# Patient Record
Sex: MALE | Race: WHITE | NOT HISPANIC OR LATINO | Employment: UNEMPLOYED | URBAN - METROPOLITAN AREA
[De-identification: names, ages, dates, MRNs, and addresses within clinical notes are randomized per-mention and may not be internally consistent; named-entity substitution may affect disease eponyms.]

---

## 2017-01-03 ENCOUNTER — HOSPITAL ENCOUNTER (OUTPATIENT)
Dept: CT IMAGING | Facility: HOSPITAL | Age: 47
Discharge: HOME | End: 2017-01-03
Attending: INTERNAL MEDICINE | Admitting: INTERNAL MEDICINE

## 2017-01-03 PROCEDURE — 85027 COMPLETE CBC AUTOMATED: CPT | Performed by: NEUROLOGICAL SURGERY

## 2017-01-03 PROCEDURE — 71250 CT THORAX DX C-: CPT

## 2017-01-09 ENCOUNTER — OFFICE VISIT (OUTPATIENT)
Dept: INTERNAL MEDICINE | Facility: CLINIC | Age: 47
End: 2017-01-09

## 2017-01-09 VITALS
WEIGHT: 201 LBS | HEIGHT: 74 IN | BODY MASS INDEX: 25.8 KG/M2 | SYSTOLIC BLOOD PRESSURE: 120 MMHG | DIASTOLIC BLOOD PRESSURE: 90 MMHG | HEART RATE: 72 BPM

## 2017-01-09 DIAGNOSIS — E78.5 DYSLIPIDEMIA: ICD-10-CM

## 2017-01-09 DIAGNOSIS — K21.9 GASTROESOPHAGEAL REFLUX DISEASE WITHOUT ESOPHAGITIS: ICD-10-CM

## 2017-01-09 DIAGNOSIS — G62.9 NEUROPATHY: ICD-10-CM

## 2017-01-09 DIAGNOSIS — G63 NEUROPATHY WITH IGA MONOCLONAL GAMMOPATHY (HCC): ICD-10-CM

## 2017-01-09 DIAGNOSIS — E29.1 HYPOGONADISM IN MALE: ICD-10-CM

## 2017-01-09 DIAGNOSIS — G43.009 MIGRAINE WITHOUT AURA AND WITHOUT STATUS MIGRAINOSUS, NOT INTRACTABLE: ICD-10-CM

## 2017-01-09 DIAGNOSIS — I10 ESSENTIAL HYPERTENSION: Primary | ICD-10-CM

## 2017-01-09 DIAGNOSIS — K22.70 BARRETT'S ESOPHAGUS WITHOUT DYSPLASIA: ICD-10-CM

## 2017-01-09 DIAGNOSIS — J30.1 SEASONAL ALLERGIC RHINITIS DUE TO POLLEN: ICD-10-CM

## 2017-01-09 DIAGNOSIS — N41.1 CHRONIC PROSTATITIS: ICD-10-CM

## 2017-01-09 DIAGNOSIS — D47.2 NEUROPATHY WITH IGA MONOCLONAL GAMMOPATHY (HCC): ICD-10-CM

## 2017-01-09 DIAGNOSIS — G89.29 OTHER CHRONIC PAIN: ICD-10-CM

## 2017-01-09 DIAGNOSIS — F32.89 OTHER DEPRESSION: ICD-10-CM

## 2017-01-09 LAB
ANION GAP SERPL CALCULATED.3IONS-SCNC: 10 MMOL/L (ref 3–11)
BASOPHILS # BLD AUTO: 0.08 10*3/MM3 (ref 0–0.2)
BASOPHILS NFR BLD AUTO: 0.8 % (ref 0–1)
BILIRUB BLD-MCNC: NEGATIVE MG/DL
BUN BLD-MCNC: 14 MG/DL (ref 9–23)
BUN/CREAT SERPL: 9.3 (ref 7–25)
CALCIUM SPEC-SCNC: 9.5 MG/DL (ref 8.7–10.4)
CHLORIDE SERPL-SCNC: 106 MMOL/L (ref 99–109)
CLARITY, POC: CLEAR
CO2 SERPL-SCNC: 28 MMOL/L (ref 20–31)
COLOR UR: YELLOW
CREAT BLD-MCNC: 1.5 MG/DL (ref 0.6–1.3)
DEPRECATED RDW RBC AUTO: 41.6 FL (ref 37–54)
EOSINOPHIL # BLD AUTO: 0.48 10*3/MM3 (ref 0.1–0.3)
EOSINOPHIL NFR BLD AUTO: 5 % (ref 0–3)
ERYTHROCYTE [DISTWIDTH] IN BLOOD BY AUTOMATED COUNT: 13.1 % (ref 11.3–14.5)
GFR SERPL CREATININE-BSD FRML MDRD: 51 ML/MIN/1.73
GLUCOSE BLD-MCNC: 77 MG/DL (ref 70–100)
GLUCOSE UR STRIP-MCNC: NEGATIVE MG/DL
HCT VFR BLD AUTO: 47.6 % (ref 38.9–50.9)
HGB BLD-MCNC: 17 G/DL (ref 13.1–17.5)
IMM GRANULOCYTES # BLD: 0.02 10*3/MM3 (ref 0–0.03)
IMM GRANULOCYTES NFR BLD: 0.2 % (ref 0–0.6)
KETONES UR QL: NEGATIVE
LEUKOCYTE EST, POC: NEGATIVE
LYMPHOCYTES # BLD AUTO: 2.52 10*3/MM3 (ref 0.6–4.8)
LYMPHOCYTES NFR BLD AUTO: 26.1 % (ref 24–44)
MCH RBC QN AUTO: 31.1 PG (ref 27–31)
MCHC RBC AUTO-ENTMCNC: 35.7 G/DL (ref 32–36)
MCV RBC AUTO: 87 FL (ref 80–99)
MONOCYTES # BLD AUTO: 0.95 10*3/MM3 (ref 0–1)
MONOCYTES NFR BLD AUTO: 9.8 % (ref 0–12)
NEUTROPHILS # BLD AUTO: 5.61 10*3/MM3 (ref 1.5–8.3)
NEUTROPHILS NFR BLD AUTO: 58.1 % (ref 41–71)
NITRITE UR-MCNC: NEGATIVE MG/ML
PH UR: 5 [PH] (ref 5–8)
PLATELET # BLD AUTO: 255 10*3/MM3 (ref 150–450)
PMV BLD AUTO: 11.3 FL (ref 6–12)
POTASSIUM BLD-SCNC: 4.5 MMOL/L (ref 3.5–5.5)
PROT UR STRIP-MCNC: NEGATIVE MG/DL
RBC # BLD AUTO: 5.47 10*6/MM3 (ref 4.2–5.76)
RBC # UR STRIP: NEGATIVE /UL
SODIUM BLD-SCNC: 144 MMOL/L (ref 132–146)
SP GR UR: 1.02 (ref 1–1.03)
UROBILINOGEN UR QL: NORMAL
WBC NRBC COR # BLD: 9.66 10*3/MM3 (ref 3.5–10.8)

## 2017-01-09 PROCEDURE — 81003 URINALYSIS AUTO W/O SCOPE: CPT | Performed by: INTERNAL MEDICINE

## 2017-01-09 PROCEDURE — 85025 COMPLETE CBC W/AUTO DIFF WBC: CPT | Performed by: INTERNAL MEDICINE

## 2017-01-09 PROCEDURE — 80048 BASIC METABOLIC PNL TOTAL CA: CPT | Performed by: INTERNAL MEDICINE

## 2017-01-09 PROCEDURE — 99214 OFFICE O/P EST MOD 30 MIN: CPT | Performed by: INTERNAL MEDICINE

## 2017-01-09 PROCEDURE — 36415 COLL VENOUS BLD VENIPUNCTURE: CPT | Performed by: INTERNAL MEDICINE

## 2017-01-09 NOTE — PROGRESS NOTES
"Patient is a 46 y.o. male who is here for abnormal lab results.  Chief Complaint   Patient presents with   • Abnormal Lab         HPI:  Here for f/u.  Has recent labs and noted to have mildly elevated WBC.  No fever.  Occasional night sweats.  Some dysuria.  No cough.  Occasional nausea.  No diarrhea.  No skin rash.     History:    Patient Active Problem List   Diagnosis   • Allergic rhinitis   • Salvador esophagus   • Chronic pain   • Chronic prostatitis   • Depression   • Dyslipidemia   • GERD (gastroesophageal reflux disease)   • Hypertension   • Hypogonadism in male   • Neuropathy with IgA monoclonal gammopathy   • Migraine   • Neuropathy   • Pulmonary nodule   • Vertigo   • Tinea versicolor   • Acute frontal sinusitis       Past Medical History   Diagnosis Date   • Dupuytren contracture      s/p bilateral hand surgery   • Dyspnea    • Kidney infection    • Pancreatitis        Past Surgical History   Procedure Laterality Date   • Hand surgery     • Umbilical hernia repair       Dr. Diego Vasquez   • Lumbar discectomy       \"Spinal\" times 2 in 2003 and 2005       Current Outpatient Prescriptions on File Prior to Visit   Medication Sig   • azelastine (ASTEPRO) 0.15 % solution nasal spray 1 Squirt into each nostril 2 (two) times a day.   • cetirizine (ZyrTEC) 10 MG tablet Take 1 tablet by mouth Daily As Needed for allergies.   • doxazosin (CARDURA) 2 MG tablet Take 1 tablet by mouth Every Night.   • DULoxetine (CYMBALTA) 60 MG capsule Take 1 capsule by mouth daily.   • famotidine (PEPCID) 40 MG tablet every night.   • fluticasone (FLONASE) 50 MCG/ACT nasal spray 2 (two) times a day.   • gabapentin (NEURONTIN) 600 MG tablet TAKE 1 TABLET BY MOUTH TWICE DAILY.   • lisinopril (PRINIVIL,ZESTRIL) 2.5 MG tablet TAKE 1 TABLET BY MOUTH ONCE DAILY.   • meclizine (ANTIVERT) 25 MG tablet Take  by mouth 4 (four) times a day.   • methocarbamol (ROBAXIN) 750 MG tablet 3 (Three) Times a Day As Needed.   • omeprazole (PriLOSEC) 40 " MG capsule 2 (two) times a day.   • ondansetron (ZOFRAN) 4 MG tablet Take  by mouth.   • oxyCODONE-acetaminophen (PERCOCET) 5-325 MG per tablet 3 (three) times a day as needed.   • SUMAtriptan (IMITREX) 50 MG tablet Take one tablet at onset of headache. May repeat dose one time in 2 hours if headache not relieved.     No current facility-administered medications on file prior to visit.        Family History   Problem Relation Age of Onset   • Hypertension Other    • Other Other        Social History     Social History   • Marital status:      Spouse name: N/A   • Number of children: N/A   • Years of education: N/A     Occupational History   • Not on file.     Social History Main Topics   • Smoking status: Current Every Day Smoker     Packs/day: 0.50   • Smokeless tobacco: Not on file   • Alcohol use Not on file   • Drug use: Not on file   • Sexual activity: Not on file     Other Topics Concern   • Not on file     Social History Narrative         ROS:    Review of Systems   Constitutional: Negative for chills, diaphoresis, fatigue, fever and unexpected weight change.   HENT: Negative for congestion, ear pain, hearing loss, nosebleeds, postnasal drip, sinus pressure and sore throat.    Eyes: Negative for pain, discharge and itching.   Respiratory: Negative for cough, chest tightness, shortness of breath and wheezing.    Cardiovascular: Negative for chest pain, palpitations and leg swelling.   Gastrointestinal: Positive for abdominal pain. Negative for abdominal distention, blood in stool, constipation, diarrhea, nausea and vomiting.   Endocrine: Negative for polydipsia and polyuria.   Genitourinary: Positive for dysuria, frequency and urgency. Negative for difficulty urinating and hematuria.   Musculoskeletal: Negative for arthralgias, back pain, gait problem, joint swelling and myalgias.   Skin: Negative for rash and wound.   Neurological: Positive for light-headedness and headaches. Negative for dizziness,  "syncope and weakness.   Psychiatric/Behavioral: Positive for behavioral problems and sleep disturbance. Negative for dysphoric mood. The patient is not nervous/anxious.        Visit Vitals   • /90   • Pulse 72   • Ht 74\" (188 cm)   • Wt 201 lb (91.2 kg)   • BMI 25.81 kg/m2       Physical Exam:    Physical Exam   Constitutional: He is oriented to person, place, and time. He appears well-developed and well-nourished.   HENT:   Head: Normocephalic and atraumatic.   Right Ear: External ear normal.   Left Ear: External ear normal.   Mouth/Throat: Oropharynx is clear and moist.   Eyes: Conjunctivae and EOM are normal.   Neck: Normal range of motion. Neck supple.   Cardiovascular: Normal rate, regular rhythm and normal heart sounds.    Pulmonary/Chest: Effort normal and breath sounds normal.   Abdominal: Soft. Bowel sounds are normal.   Musculoskeletal: Normal range of motion.   Lymphadenopathy:     He has no cervical adenopathy.   Neurological: He is alert and oriented to person, place, and time.   Skin: Skin is warm and dry.   Psychiatric: He has a normal mood and affect. His behavior is normal. Thought content normal.       Procedure:      Discussion/Summary:  hypogonadism-no planned tx  gerd-cont ppi  htn-advised to monitor with goal <130/80, advised to limit NA  hyperlipidemia-labs on rtc, counseled on diet  igA nephropathy-f/u nephrology  migraines-change to imitrex  chronic pain-per pain mgmt, cont gabapentin and cont cymbalta at 60 mg  chronic prostatitis-see above  allergic rhinitis-switch to zyrtec  abdominal pain- cont pepcid  tinea versicolor-ketoconzole cream prn  pulmonary nodule-rescan soon   Elevated WBC-recheck      labs noted and dw patient      Current Outpatient Prescriptions:   •  azelastine (ASTEPRO) 0.15 % solution nasal spray, 1 Squirt into each nostril 2 (two) times a day., Disp: , Rfl:   •  cetirizine (ZyrTEC) 10 MG tablet, Take 1 tablet by mouth Daily As Needed for allergies., Disp: 30 " tablet, Rfl: 2  •  doxazosin (CARDURA) 2 MG tablet, Take 1 tablet by mouth Every Night., Disp: 30 tablet, Rfl: 5  •  DULoxetine (CYMBALTA) 60 MG capsule, Take 1 capsule by mouth daily., Disp: , Rfl:   •  famotidine (PEPCID) 40 MG tablet, every night., Disp: , Rfl: 0  •  fluticasone (FLONASE) 50 MCG/ACT nasal spray, 2 (two) times a day., Disp: , Rfl: 9  •  gabapentin (NEURONTIN) 600 MG tablet, TAKE 1 TABLET BY MOUTH TWICE DAILY., Disp: 60 tablet, Rfl: 1  •  lisinopril (PRINIVIL,ZESTRIL) 2.5 MG tablet, TAKE 1 TABLET BY MOUTH ONCE DAILY., Disp: 90 tablet, Rfl: 1  •  meclizine (ANTIVERT) 25 MG tablet, Take  by mouth 4 (four) times a day., Disp: , Rfl:   •  methocarbamol (ROBAXIN) 750 MG tablet, 3 (Three) Times a Day As Needed., Disp: , Rfl: 5  •  omeprazole (PriLOSEC) 40 MG capsule, 2 (two) times a day., Disp: , Rfl: 11  •  ondansetron (ZOFRAN) 4 MG tablet, Take  by mouth., Disp: , Rfl:   •  oxyCODONE-acetaminophen (PERCOCET) 5-325 MG per tablet, 3 (three) times a day as needed., Disp: , Rfl: 0  •  SUMAtriptan (IMITREX) 50 MG tablet, Take one tablet at onset of headache. May repeat dose one time in 2 hours if headache not relieved., Disp: 9 tablet, Rfl: 2        Lux was seen today for abnormal lab.    Diagnoses and all orders for this visit:    Essential hypertension  -     CBC & Differential  -     Basic Metabolic Panel    Migraine without aura and without status migrainosus, not intractable    Seasonal allergic rhinitis due to pollen    Salvador's esophagus without dysplasia    Gastroesophageal reflux disease without esophagitis    Hypogonadism in male    Neuropathy with IgA monoclonal gammopathy    Neuropathy    Chronic prostatitis  -     POC Urinalysis Dipstick, Automated    Other chronic pain    Other depression    Dyslipidemia

## 2017-03-07 ENCOUNTER — OFFICE VISIT (OUTPATIENT)
Dept: INTERNAL MEDICINE | Facility: CLINIC | Age: 47
End: 2017-03-07

## 2017-03-07 VITALS
DIASTOLIC BLOOD PRESSURE: 92 MMHG | HEART RATE: 72 BPM | BODY MASS INDEX: 25.87 KG/M2 | SYSTOLIC BLOOD PRESSURE: 110 MMHG | WEIGHT: 201.6 LBS | HEIGHT: 74 IN

## 2017-03-07 DIAGNOSIS — G43.009 MIGRAINE WITHOUT AURA AND WITHOUT STATUS MIGRAINOSUS, NOT INTRACTABLE: ICD-10-CM

## 2017-03-07 DIAGNOSIS — D47.2 NEUROPATHY WITH IGA MONOCLONAL GAMMOPATHY (HCC): ICD-10-CM

## 2017-03-07 DIAGNOSIS — G63 NEUROPATHY WITH IGA MONOCLONAL GAMMOPATHY (HCC): ICD-10-CM

## 2017-03-07 DIAGNOSIS — E78.5 DYSLIPIDEMIA: ICD-10-CM

## 2017-03-07 DIAGNOSIS — G89.29 OTHER CHRONIC PAIN: ICD-10-CM

## 2017-03-07 DIAGNOSIS — N41.1 CHRONIC PROSTATITIS: ICD-10-CM

## 2017-03-07 DIAGNOSIS — G62.9 NEUROPATHY: ICD-10-CM

## 2017-03-07 DIAGNOSIS — E29.1 HYPOGONADISM IN MALE: ICD-10-CM

## 2017-03-07 DIAGNOSIS — K22.70 BARRETT'S ESOPHAGUS WITHOUT DYSPLASIA: ICD-10-CM

## 2017-03-07 DIAGNOSIS — J30.1 SEASONAL ALLERGIC RHINITIS DUE TO POLLEN: ICD-10-CM

## 2017-03-07 DIAGNOSIS — I10 ESSENTIAL HYPERTENSION: Primary | ICD-10-CM

## 2017-03-07 DIAGNOSIS — J01.10 ACUTE FRONTAL SINUSITIS, RECURRENCE NOT SPECIFIED: ICD-10-CM

## 2017-03-07 DIAGNOSIS — F32.89 OTHER DEPRESSION: ICD-10-CM

## 2017-03-07 DIAGNOSIS — K21.9 GASTROESOPHAGEAL REFLUX DISEASE WITHOUT ESOPHAGITIS: ICD-10-CM

## 2017-03-07 PROCEDURE — 99214 OFFICE O/P EST MOD 30 MIN: CPT | Performed by: INTERNAL MEDICINE

## 2017-03-07 RX ORDER — DOXYCYCLINE HYCLATE 100 MG/1
100 CAPSULE ORAL 2 TIMES DAILY
Qty: 20 CAPSULE | Refills: 0 | Status: ON HOLD | OUTPATIENT
Start: 2017-03-07 | End: 2020-03-22

## 2017-03-07 RX ORDER — OXYCODONE AND ACETAMINOPHEN 7.5; 325 MG/1; MG/1
TABLET ORAL EVERY 8 HOURS PRN
Refills: 0 | COMMUNITY
Start: 2017-02-07 | End: 2020-03-25 | Stop reason: HOSPADM

## 2017-03-07 NOTE — PROGRESS NOTES
"Patient is a 46 y.o. male who is here for a follow up of chronic conditions and possible sinus infection.  Chief Complaint   Patient presents with   • Hypertension   • Hyperlipidemia   • Pain         HPI:  Here for f/u.  Today c/o head congestion and throat irritation.  Some cough.  Had some facial pressure.  Continues to smoke about 1/2 ppd.  No hemoptysis or epistaxis.  No recent antibiotics.  Has some right ear pressure.     History:    Patient Active Problem List   Diagnosis   • Allergic rhinitis   • Salvador esophagus   • Chronic pain   • Chronic prostatitis   • Depression   • Dyslipidemia   • GERD (gastroesophageal reflux disease)   • Hypertension   • Hypogonadism in male   • Neuropathy with IgA monoclonal gammopathy   • Migraine   • Neuropathy   • Pulmonary nodule   • Vertigo   • Tinea versicolor   • Acute frontal sinusitis       Past Medical History   Diagnosis Date   • Dupuytren contracture      s/p bilateral hand surgery   • Dyspnea    • Kidney infection    • Pancreatitis        Past Surgical History   Procedure Laterality Date   • Hand surgery     • Umbilical hernia repair       Dr. Diego Vasquez   • Lumbar discectomy       \"Spinal\" times 2 in 2003 and 2005   • Cervical discectomy  02/2017     C6-[facility] at [facility] in [place]       Current Outpatient Prescriptions on File Prior to Visit   Medication Sig   • azelastine (ASTEPRO) 0.15 % solution nasal spray 1 Squirt into each nostril 2 (two) times a day.   • cetirizine (ZyrTEC) 10 MG tablet Take 1 tablet by mouth Daily As Needed for allergies.   • doxazosin (CARDURA) 2 MG tablet Take 1 tablet by mouth Every Night.   • DULoxetine (CYMBALTA) 60 MG capsule Take 1 capsule by mouth daily.   • famotidine (PEPCID) 40 MG tablet every night.   • fluticasone (FLONASE) 50 MCG/ACT nasal spray 2 (two) times a day.   • gabapentin (NEURONTIN) 600 MG tablet TAKE 1 TABLET BY MOUTH TWICE DAILY.   • lisinopril (PRINIVIL,ZESTRIL) 2.5 MG tablet TAKE 1 TABLET BY MOUTH ONCE DAILY.   • " meclizine (ANTIVERT) 25 MG tablet Take  by mouth 4 (four) times a day.   • methocarbamol (ROBAXIN) 750 MG tablet 3 (Three) Times a Day As Needed.   • omeprazole (PriLOSEC) 40 MG capsule 2 (two) times a day.   • ondansetron (ZOFRAN) 4 MG tablet Take  by mouth.   • SUMAtriptan (IMITREX) 50 MG tablet Take one tablet at onset of headache. May repeat dose one time in 2 hours if headache not relieved.   • [DISCONTINUED] oxyCODONE-acetaminophen (PERCOCET) 5-325 MG per tablet 3 (three) times a day as needed.     No current facility-administered medications on file prior to visit.        Family History   Problem Relation Age of Onset   • Hypertension Other    • Other Other        Social History     Social History   • Marital status:      Spouse name: N/A   • Number of children: N/A   • Years of education: N/A     Occupational History   • Not on file.     Social History Main Topics   • Smoking status: Current Every Day Smoker     Packs/day: 0.50   • Smokeless tobacco: Not on file   • Alcohol use Not on file   • Drug use: Not on file   • Sexual activity: Not on file     Other Topics Concern   • Not on file     Social History Narrative         ROS:    Review of Systems   Constitutional: Negative for chills, diaphoresis, fatigue, fever and unexpected weight change.   HENT: Positive for rhinorrhea and sinus pressure. Negative for congestion, ear pain, hearing loss, nosebleeds, postnasal drip and sore throat.    Eyes: Negative for pain, discharge and itching.   Respiratory: Positive for cough. Negative for chest tightness, shortness of breath and wheezing.    Cardiovascular: Negative for chest pain, palpitations and leg swelling.   Gastrointestinal: Positive for abdominal pain. Negative for abdominal distention, blood in stool, constipation, diarrhea, nausea and vomiting.   Endocrine: Negative for polydipsia and polyuria.   Genitourinary: Positive for dysuria, frequency and urgency. Negative for difficulty urinating and  "hematuria.   Musculoskeletal: Negative for arthralgias, back pain, gait problem, joint swelling and myalgias.   Skin: Negative for rash and wound.   Neurological: Positive for light-headedness and headaches. Negative for dizziness, syncope and weakness.   Psychiatric/Behavioral: Positive for behavioral problems and sleep disturbance. Negative for dysphoric mood. The patient is not nervous/anxious.        Visit Vitals   • /92   • Pulse 72   • Ht 74\" (188 cm)   • Wt 201 lb 9.6 oz (91.4 kg)   • BMI 25.88 kg/m2       Physical Exam:    Physical Exam   Constitutional: He is oriented to person, place, and time. He appears well-developed and well-nourished.   HENT:   Head: Normocephalic and atraumatic.   Right Ear: External ear normal.   Left Ear: External ear normal.   Mouth/Throat: Oropharynx is clear and moist.   Frontal tenderness.    Eyes: Conjunctivae and EOM are normal.   Neck: Normal range of motion. Neck supple.   Cardiovascular: Normal rate, regular rhythm and normal heart sounds.    Pulmonary/Chest: Effort normal and breath sounds normal.   Abdominal: Soft. Bowel sounds are normal.   Musculoskeletal: Normal range of motion.   Lymphadenopathy:     He has no cervical adenopathy.   Neurological: He is alert and oriented to person, place, and time.   Skin: Skin is warm and dry.   Psychiatric: He has a normal mood and affect. His behavior is normal. Thought content normal.       Procedure:      Discussion/Summary:  hypogonadism-no planned tx  gerd-cont ppi  htn-advised to monitor with goal <130/80, advised to limit NA  hyperlipidemia-labs on rtc, counseled on diet  igA nephropathy-f/u nephrology  migraines-change to imitrex  chronic pain-per pain mgmt, cont gabapentin and cont cymbalta at 60 mg  chronic prostatitis-see above  allergic rhinitis-cont nasal steroid and prn zyrtec  abdominal pain- cont pepcid  tinea versicolor-ketoconzole cream prn  pulmonary nodule-rescan noted  Elevated WBC-resolved  Sinusitis-rx " doxy      labs noted and dw patient      Current Outpatient Prescriptions:   •  azelastine (ASTEPRO) 0.15 % solution nasal spray, 1 Squirt into each nostril 2 (two) times a day., Disp: , Rfl:   •  cetirizine (ZyrTEC) 10 MG tablet, Take 1 tablet by mouth Daily As Needed for allergies., Disp: 30 tablet, Rfl: 2  •  doxazosin (CARDURA) 2 MG tablet, Take 1 tablet by mouth Every Night., Disp: 30 tablet, Rfl: 5  •  DULoxetine (CYMBALTA) 60 MG capsule, Take 1 capsule by mouth daily., Disp: , Rfl:   •  famotidine (PEPCID) 40 MG tablet, every night., Disp: , Rfl: 0  •  fluticasone (FLONASE) 50 MCG/ACT nasal spray, 2 (two) times a day., Disp: , Rfl: 9  •  gabapentin (NEURONTIN) 600 MG tablet, TAKE 1 TABLET BY MOUTH TWICE DAILY., Disp: 60 tablet, Rfl: 1  •  lisinopril (PRINIVIL,ZESTRIL) 2.5 MG tablet, TAKE 1 TABLET BY MOUTH ONCE DAILY., Disp: 90 tablet, Rfl: 1  •  meclizine (ANTIVERT) 25 MG tablet, Take  by mouth 4 (four) times a day., Disp: , Rfl:   •  methocarbamol (ROBAXIN) 750 MG tablet, 3 (Three) Times a Day As Needed., Disp: , Rfl: 5  •  omeprazole (PriLOSEC) 40 MG capsule, 2 (two) times a day., Disp: , Rfl: 11  •  ondansetron (ZOFRAN) 4 MG tablet, Take  by mouth., Disp: , Rfl:   •  oxyCODONE-acetaminophen (PERCOCET) 7.5-325 MG per tablet, Every 4 (Four) Hours As Needed., Disp: , Rfl: 0  •  SUMAtriptan (IMITREX) 50 MG tablet, Take one tablet at onset of headache. May repeat dose one time in 2 hours if headache not relieved., Disp: 9 tablet, Rfl: 2  •  doxycycline (VIBRAMYCIN) 100 MG capsule, Take 1 capsule by mouth 2 (Two) Times a Day., Disp: 20 capsule, Rfl: 0        Lux was seen today for hypertension, hyperlipidemia and pain.    Diagnoses and all orders for this visit:    Essential hypertension    Migraine without aura and without status migrainosus, not intractable    Acute frontal sinusitis, recurrence not specified  -     doxycycline (VIBRAMYCIN) 100 MG capsule; Take 1 capsule by mouth 2 (Two) Times a  Day.    Seasonal allergic rhinitis due to pollen    Salvador's esophagus without dysplasia    Gastroesophageal reflux disease without esophagitis    Hypogonadism in male    Neuropathy    Neuropathy with IgA monoclonal gammopathy    Chronic prostatitis    Other chronic pain    Other depression    Dyslipidemia

## 2017-04-29 DIAGNOSIS — I10 ESSENTIAL HYPERTENSION: ICD-10-CM

## 2017-05-01 RX ORDER — DOXAZOSIN 2 MG/1
TABLET ORAL
Qty: 30 TABLET | Refills: 5 | Status: SHIPPED | OUTPATIENT
Start: 2017-05-01 | End: 2017-10-04 | Stop reason: SDUPTHER

## 2017-08-08 ENCOUNTER — TRANSCRIBE ORDERS (OUTPATIENT)
Dept: ADMINISTRATIVE | Facility: HOSPITAL | Age: 47
End: 2017-08-08

## 2017-08-08 DIAGNOSIS — K29.80 DUODENITIS: Primary | ICD-10-CM

## 2017-08-11 ENCOUNTER — APPOINTMENT (OUTPATIENT)
Dept: CT IMAGING | Facility: HOSPITAL | Age: 47
End: 2017-08-11
Attending: INTERNAL MEDICINE

## 2017-08-15 ENCOUNTER — HOSPITAL ENCOUNTER (OUTPATIENT)
Dept: CT IMAGING | Facility: HOSPITAL | Age: 47
Discharge: HOME | End: 2017-08-15
Attending: INTERNAL MEDICINE | Admitting: INTERNAL MEDICINE

## 2017-08-15 DIAGNOSIS — K29.80 DUODENITIS: ICD-10-CM

## 2017-08-15 PROCEDURE — 74177 CT ABD & PELVIS W/CONTRAST: CPT

## 2017-08-15 PROCEDURE — 0 IOPAMIDOL PER 1 ML: Performed by: INTERNAL MEDICINE

## 2017-08-15 RX ORDER — KETOCONAZOLE 20 MG/ML
SHAMPOO TOPICAL
Qty: 120 ML | Refills: 0 | Status: ON HOLD | OUTPATIENT
Start: 2017-08-15 | End: 2020-03-22

## 2017-08-15 RX ADMIN — IOPAMIDOL 100 ML: 755 INJECTION, SOLUTION INTRAVENOUS at 07:51

## 2017-10-04 DIAGNOSIS — I10 ESSENTIAL HYPERTENSION: ICD-10-CM

## 2017-10-04 RX ORDER — DOXAZOSIN 2 MG/1
TABLET ORAL
Qty: 30 TABLET | Refills: 4 | Status: SHIPPED | OUTPATIENT
Start: 2017-10-04 | End: 2018-02-21 | Stop reason: SDUPTHER

## 2017-12-14 DIAGNOSIS — G43.009 MIGRAINE WITHOUT AURA AND WITHOUT STATUS MIGRAINOSUS, NOT INTRACTABLE: ICD-10-CM

## 2017-12-15 RX ORDER — SUMATRIPTAN 50 MG/1
TABLET, FILM COATED ORAL
Qty: 9 TABLET | Refills: 1 | Status: ON HOLD | OUTPATIENT
Start: 2017-12-15 | End: 2020-03-22

## 2018-02-21 DIAGNOSIS — I10 ESSENTIAL HYPERTENSION: ICD-10-CM

## 2018-02-21 RX ORDER — DOXAZOSIN 2 MG/1
TABLET ORAL
Qty: 30 TABLET | Refills: 4 | Status: SHIPPED | OUTPATIENT
Start: 2018-02-21 | End: 2018-07-15 | Stop reason: SDUPTHER

## 2018-07-15 DIAGNOSIS — I10 ESSENTIAL HYPERTENSION: ICD-10-CM

## 2018-07-16 RX ORDER — DOXAZOSIN 2 MG/1
TABLET ORAL
Qty: 30 TABLET | Refills: 4 | Status: ON HOLD | OUTPATIENT
Start: 2018-07-16 | End: 2020-03-22

## 2018-12-15 DIAGNOSIS — I10 ESSENTIAL HYPERTENSION: ICD-10-CM

## 2018-12-17 RX ORDER — DOXAZOSIN 2 MG/1
TABLET ORAL
Qty: 30 TABLET | Refills: 4 | OUTPATIENT
Start: 2018-12-17

## 2019-01-08 ENCOUNTER — HOSPITAL ENCOUNTER (OUTPATIENT)
Age: 49
End: 2019-01-08
Payer: MEDICAID

## 2019-01-08 DIAGNOSIS — R19.7: ICD-10-CM

## 2019-01-08 DIAGNOSIS — K29.70: Primary | ICD-10-CM

## 2019-01-08 PROCEDURE — 87177 OVA AND PARASITES SMEARS: CPT

## 2019-01-08 PROCEDURE — 87324 CLOSTRIDIUM AG IA: CPT

## 2019-01-08 PROCEDURE — 87045 FECES CULTURE AEROBIC BACT: CPT

## 2019-07-31 ENCOUNTER — TRANSCRIBE ORDERS (OUTPATIENT)
Dept: ADMINISTRATIVE | Facility: HOSPITAL | Age: 49
End: 2019-07-31

## 2019-07-31 DIAGNOSIS — R11.0 NAUSEA: Primary | ICD-10-CM

## 2019-08-03 ENCOUNTER — HOSPITAL ENCOUNTER (OUTPATIENT)
Dept: ULTRASOUND IMAGING | Facility: HOSPITAL | Age: 49
Discharge: HOME | End: 2019-08-03
Admitting: PHYSICIAN ASSISTANT

## 2019-08-03 DIAGNOSIS — R11.0 NAUSEA: ICD-10-CM

## 2019-08-03 PROCEDURE — 76705 ECHO EXAM OF ABDOMEN: CPT

## 2019-08-07 ENCOUNTER — TRANSCRIBE ORDERS (OUTPATIENT)
Dept: ADMINISTRATIVE | Facility: HOSPITAL | Age: 49
End: 2019-08-07

## 2019-08-07 DIAGNOSIS — R10.13 DYSPEPSIA: Primary | ICD-10-CM

## 2019-08-08 ENCOUNTER — HOSPITAL ENCOUNTER (OUTPATIENT)
Dept: HOSPITAL 22 - OT | Age: 49
Discharge: HOME | End: 2019-08-08
Payer: MEDICAID

## 2019-08-08 ENCOUNTER — HOSPITAL ENCOUNTER (OUTPATIENT)
Age: 49
End: 2019-08-08
Payer: MEDICAID

## 2019-08-08 DIAGNOSIS — M77.11: Primary | ICD-10-CM

## 2019-08-08 DIAGNOSIS — N18.3: Primary | ICD-10-CM

## 2019-08-08 LAB
ALBUMIN LEVEL: 3.8 GM/DL (ref 3.4–5)
ANION GAP SERPL CALC-SCNC: 12.4 MEQ/L (ref 5–15)
BUN SERPL-MCNC: 15 MG/DL (ref 7–18)
CALCIUM SPEC-MCNC: 9 MG/DL (ref 8.5–10.1)
CHLORIDE SPEC-SCNC: 107 MMOL/L (ref 98–107)
CO2 SERPL-SCNC: 28 MMOL/L (ref 21–32)
COLOR UR: YELLOW
CREAT BLD-SCNC: 1.79 MG/DL (ref 0.7–1.3)
ESTIMATED GLOMERULAR FILT RATE: 41 ML/MIN (ref 60–?)
GFR (AFRICAN AMERICAN): 49 ML/MIN (ref 60–?)
GLUCOSE: 105 MG/DL (ref 74–106)
MAGNESIUM: 1.8 MG/DL (ref 1.4–2.2)
MICRO URNS: (no result)
PH UR: 5.5 [PH] (ref 5–8.5)
PHOSPHOROUS: 3.4 MG/DL (ref 2.4–4.9)
POTASSIUM: 4.4 MMOL/L (ref 3.5–5.1)
SODIUM SPEC-SCNC: 143 MMOL/L (ref 136–145)
SP GR UR: >= 1.03 (ref 1–1.03)
UROBILINOGEN UR QL: 0.2 EU/DL

## 2019-08-08 PROCEDURE — 97110 THERAPEUTIC EXERCISES: CPT

## 2019-08-08 PROCEDURE — 82570 ASSAY OF URINE CREATININE: CPT

## 2019-08-08 PROCEDURE — 83735 ASSAY OF MAGNESIUM: CPT

## 2019-08-08 PROCEDURE — 80069 RENAL FUNCTION PANEL: CPT

## 2019-08-08 PROCEDURE — G0283 ELEC STIM OTHER THAN WOUND: HCPCS

## 2019-08-08 PROCEDURE — 36415 COLL VENOUS BLD VENIPUNCTURE: CPT

## 2019-08-08 PROCEDURE — 84155 ASSAY OF PROTEIN SERUM: CPT

## 2019-08-08 PROCEDURE — 82652 VIT D 1 25-DIHYDROXY: CPT

## 2019-08-08 PROCEDURE — 97165 OT EVAL LOW COMPLEX 30 MIN: CPT

## 2019-08-08 PROCEDURE — 97014 ELECTRIC STIMULATION THERAPY: CPT

## 2019-08-08 PROCEDURE — 81001 URINALYSIS AUTO W/SCOPE: CPT

## 2019-08-08 PROCEDURE — 83970 ASSAY OF PARATHORMONE: CPT

## 2019-08-09 LAB — PARATHYROID HORMONE INTACT: 33 PG/ML (ref 15–65)

## 2019-09-03 ENCOUNTER — HOSPITAL ENCOUNTER (OUTPATIENT)
Dept: NUCLEAR MEDICINE | Facility: HOSPITAL | Age: 49
Discharge: HOME | End: 2019-09-03

## 2019-09-03 DIAGNOSIS — R10.13 DYSPEPSIA: ICD-10-CM

## 2019-09-03 PROCEDURE — 78227 HEPATOBIL SYST IMAGE W/DRUG: CPT

## 2019-09-03 PROCEDURE — 25010000002 SINCALIDE PER 5 MCG: Performed by: PHYSICIAN ASSISTANT

## 2019-09-03 PROCEDURE — 0 TECHNETIUM TC 99M MEBROFENIN KIT: Performed by: PHYSICIAN ASSISTANT

## 2019-09-03 PROCEDURE — A9537 TC99M MEBROFENIN: HCPCS | Performed by: PHYSICIAN ASSISTANT

## 2019-09-03 RX ORDER — KIT FOR THE PREPARATION OF TECHNETIUM TC 99M MEBROFENIN 45 MG/10ML
1 INJECTION, POWDER, LYOPHILIZED, FOR SOLUTION INTRAVENOUS
Status: COMPLETED | OUTPATIENT
Start: 2019-09-03 | End: 2019-09-03

## 2019-09-03 RX ADMIN — MEBROFENIN 1 DOSE: 45 INJECTION, POWDER, LYOPHILIZED, FOR SOLUTION INTRAVENOUS at 08:25

## 2019-09-03 RX ADMIN — SINCALIDE 1.7 MCG: 5 INJECTION, POWDER, LYOPHILIZED, FOR SOLUTION INTRAVENOUS at 09:25

## 2019-11-28 ENCOUNTER — HOSPITAL ENCOUNTER (OUTPATIENT)
Age: 49
End: 2019-11-28
Payer: MEDICAID

## 2019-11-28 DIAGNOSIS — N18.3: Primary | ICD-10-CM

## 2019-11-28 DIAGNOSIS — N02.8: ICD-10-CM

## 2019-11-28 DIAGNOSIS — E55.9: ICD-10-CM

## 2019-11-28 DIAGNOSIS — R31.9: ICD-10-CM

## 2019-11-28 LAB
ALBUMIN LEVEL: 3.8 GM/DL (ref 3.4–5)
ANION GAP SERPL CALC-SCNC: 13.3 MEQ/L (ref 5–15)
BUN SERPL-MCNC: 14 MG/DL (ref 7–18)
CALCIUM SPEC-MCNC: 8.7 MG/DL (ref 8.5–10.1)
CHLORIDE SPEC-SCNC: 105 MMOL/L (ref 98–107)
CO2 SERPL-SCNC: 28 MMOL/L (ref 21–32)
COLOR UR: YELLOW
CREAT BLD-SCNC: 1.61 MG/DL (ref 0.7–1.3)
ESTIMATED GLOMERULAR FILT RATE: 46 ML/MIN (ref 60–?)
GFR (AFRICAN AMERICAN): 56 ML/MIN (ref 60–?)
GLUCOSE: 84 MG/DL (ref 74–106)
HCT VFR BLD CALC: 48.1 % (ref 42–52)
HGB BLD-MCNC: 16.6 G/DL (ref 14.1–18)
HYALINE CASTS URNS QL MICRO: (no result) #/LPF
MCHC RBC-ENTMCNC: 34.6 G/DL (ref 31.8–35.4)
MCV RBC: 89.4 FL (ref 80–94)
MEAN CORPUSCULAR HEMOGLOBIN: 30.9 PG (ref 27–31.2)
MICRO URNS: (no result)
PH UR: 5.5 [PH] (ref 5–8.5)
PHOSPHOROUS: 3.1 MG/DL (ref 2.4–4.9)
PLATELET # BLD: 256 K/MM3 (ref 142–424)
POTASSIUM: 4.3 MMOL/L (ref 3.5–5.1)
RBC # BLD AUTO: 5.38 M/MM3 (ref 4.6–6.2)
SODIUM SPEC-SCNC: 142 MMOL/L (ref 136–145)
SP GR UR: 1.02 (ref 1–1.03)
SQUAMOUS URNS QL MICRO: (no result) #/HPF (ref 0–5)
UROBILINOGEN UR QL: 0.2 EU/DL
WBC # BLD AUTO: 9 K/MM3 (ref 4.8–10.8)

## 2019-11-28 PROCEDURE — 82570 ASSAY OF URINE CREATININE: CPT

## 2019-11-28 PROCEDURE — 81001 URINALYSIS AUTO W/SCOPE: CPT

## 2019-11-28 PROCEDURE — 83970 ASSAY OF PARATHORMONE: CPT

## 2019-11-28 PROCEDURE — 36415 COLL VENOUS BLD VENIPUNCTURE: CPT

## 2019-11-28 PROCEDURE — 85025 COMPLETE CBC W/AUTO DIFF WBC: CPT

## 2019-11-28 PROCEDURE — 80069 RENAL FUNCTION PANEL: CPT

## 2019-11-28 PROCEDURE — 82652 VIT D 1 25-DIHYDROXY: CPT

## 2019-11-28 PROCEDURE — 84155 ASSAY OF PROTEIN SERUM: CPT

## 2019-11-29 LAB — PARATHYROID HORMONE INTACT: 16 PG/ML (ref 15–65)

## 2020-03-22 ENCOUNTER — APPOINTMENT (OUTPATIENT)
Dept: GENERAL RADIOLOGY | Facility: HOSPITAL | Age: 50
End: 2020-03-22

## 2020-03-22 ENCOUNTER — HOSPITAL ENCOUNTER (INPATIENT)
Facility: HOSPITAL | Age: 50
LOS: 3 days | Discharge: NURSING, REHAB OR LONG-TERM CARE | End: 2020-03-25
Attending: EMERGENCY MEDICINE | Admitting: INTERNAL MEDICINE

## 2020-03-22 ENCOUNTER — APPOINTMENT (OUTPATIENT)
Dept: CARDIOLOGY | Facility: HOSPITAL | Age: 50
End: 2020-03-22

## 2020-03-22 ENCOUNTER — APPOINTMENT (OUTPATIENT)
Dept: CT IMAGING | Facility: HOSPITAL | Age: 50
End: 2020-03-22

## 2020-03-22 ENCOUNTER — APPOINTMENT (OUTPATIENT)
Dept: MRI IMAGING | Facility: HOSPITAL | Age: 50
End: 2020-03-22

## 2020-03-22 DIAGNOSIS — R47.01 APHASIA: ICD-10-CM

## 2020-03-22 DIAGNOSIS — I63.9 ACUTE CVA (CEREBROVASCULAR ACCIDENT) (HCC): Primary | ICD-10-CM

## 2020-03-22 DIAGNOSIS — Z78.9 DECREASED ACTIVITIES OF DAILY LIVING (ADL): ICD-10-CM

## 2020-03-22 DIAGNOSIS — G62.9 NEUROPATHY: ICD-10-CM

## 2020-03-22 DIAGNOSIS — G89.29 OTHER CHRONIC PAIN: ICD-10-CM

## 2020-03-22 DIAGNOSIS — J30.1 SEASONAL ALLERGIC RHINITIS DUE TO POLLEN: ICD-10-CM

## 2020-03-22 DIAGNOSIS — R41.841 COGNITIVE COMMUNICATION DEFICIT: ICD-10-CM

## 2020-03-22 PROBLEM — I70.90 ATHEROSCLEROSIS: Status: ACTIVE | Noted: 2020-03-22

## 2020-03-22 PROBLEM — N18.30 CKD (CHRONIC KIDNEY DISEASE) STAGE 3, GFR 30-59 ML/MIN (HCC): Status: ACTIVE | Noted: 2020-03-22

## 2020-03-22 PROBLEM — Z72.0 TOBACCO USE: Status: ACTIVE | Noted: 2020-03-22

## 2020-03-22 LAB
ALT SERPL W P-5'-P-CCNC: 27 U/L (ref 1–41)
AMPHET+METHAMPHET UR QL: NEGATIVE
AMPHETAMINES UR QL: NEGATIVE
ANION GAP SERPL CALCULATED.3IONS-SCNC: 13 MMOL/L (ref 5–15)
APTT PPP: 34.6 SECONDS (ref 24–37)
AST SERPL-CCNC: 27 U/L (ref 1–40)
BARBITURATES UR QL SCN: NEGATIVE
BASE EXCESS BLDA CALC-SCNC: -2 MMOL/L (ref -5–5)
BASOPHILS # BLD AUTO: 0.08 10*3/MM3 (ref 0–0.2)
BASOPHILS NFR BLD AUTO: 0.8 % (ref 0–1.5)
BENZODIAZ UR QL SCN: NEGATIVE
BH CV ECHO MEAS - AO MAX PG (FULL): 0.47 MMHG
BH CV ECHO MEAS - AO MAX PG: 3.2 MMHG
BH CV ECHO MEAS - AO MEAN PG (FULL): 0.22 MMHG
BH CV ECHO MEAS - AO MEAN PG: 1.8 MMHG
BH CV ECHO MEAS - AO V2 MAX: 90.1 CM/SEC
BH CV ECHO MEAS - AO V2 MEAN: 62.6 CM/SEC
BH CV ECHO MEAS - AO V2 VTI: 18.9 CM
BH CV ECHO MEAS - AVA(I,A): 4.6 CM^2
BH CV ECHO MEAS - AVA(I,D): 4.6 CM^2
BH CV ECHO MEAS - AVA(V,A): 4.6 CM^2
BH CV ECHO MEAS - AVA(V,D): 4.6 CM^2
BH CV ECHO MEAS - BSA(HAYCOCK): 2.2 M^2
BH CV ECHO MEAS - BSA(HAYCOCK): 2.2 M^2
BH CV ECHO MEAS - BSA: 2.2 M^2
BH CV ECHO MEAS - BSA: 2.2 M^2
BH CV ECHO MEAS - BZI_BMI: 23.7 KILOGRAMS/M^2
BH CV ECHO MEAS - BZI_BMI: 23.7 KILOGRAMS/M^2
BH CV ECHO MEAS - BZI_METRIC_HEIGHT: 195.6 CM
BH CV ECHO MEAS - BZI_METRIC_HEIGHT: 195.6 CM
BH CV ECHO MEAS - BZI_METRIC_WEIGHT: 90.7 KG
BH CV ECHO MEAS - BZI_METRIC_WEIGHT: 90.7 KG
BH CV ECHO MEAS - EDV(CUBED): 105.7 ML
BH CV ECHO MEAS - EDV(TEICH): 103.8 ML
BH CV ECHO MEAS - EF(CUBED): 66.7 %
BH CV ECHO MEAS - EF(TEICH): 58.1 %
BH CV ECHO MEAS - ESV(CUBED): 35.2 ML
BH CV ECHO MEAS - ESV(TEICH): 43.5 ML
BH CV ECHO MEAS - FS: 30.7 %
BH CV ECHO MEAS - IVS/LVPW: 1.1
BH CV ECHO MEAS - IVSD: 1 CM
BH CV ECHO MEAS - LA DIMENSION: 2.8 CM
BH CV ECHO MEAS - LAD MAJOR: 4.2 CM
BH CV ECHO MEAS - LAT PEAK E' VEL: 12.2 CM/SEC
BH CV ECHO MEAS - LATERAL E/E' RATIO: 4.1
BH CV ECHO MEAS - LV MASS(C)D: 155.3 GRAMS
BH CV ECHO MEAS - LV MASS(C)DI: 69.4 GRAMS/M^2
BH CV ECHO MEAS - LV MAX PG: 2.8 MMHG
BH CV ECHO MEAS - LV MEAN PG: 1.6 MMHG
BH CV ECHO MEAS - LV V1 MAX: 83.4 CM/SEC
BH CV ECHO MEAS - LV V1 MEAN: 61.1 CM/SEC
BH CV ECHO MEAS - LV V1 VTI: 17.4 CM
BH CV ECHO MEAS - LVIDD: 4.7 CM
BH CV ECHO MEAS - LVIDS: 3.3 CM
BH CV ECHO MEAS - LVOT AREA (M): 4.9 CM^2
BH CV ECHO MEAS - LVOT AREA: 5 CM^2
BH CV ECHO MEAS - LVOT DIAM: 2.5 CM
BH CV ECHO MEAS - LVPWD: 0.89 CM
BH CV ECHO MEAS - MED PEAK E' VEL: 6.5 CM/SEC
BH CV ECHO MEAS - MEDIAL E/E' RATIO: 7.6
BH CV ECHO MEAS - MV A MAX VEL: 59.7 CM/SEC
BH CV ECHO MEAS - MV DEC TIME: 0.25 SEC
BH CV ECHO MEAS - MV E MAX VEL: 50.8 CM/SEC
BH CV ECHO MEAS - MV E/A: 0.85
BH CV ECHO MEAS - PA ACC SLOPE: 577.2 CM/SEC^2
BH CV ECHO MEAS - PA ACC TIME: 0.12 SEC
BH CV ECHO MEAS - PA MAX PG: 3 MMHG
BH CV ECHO MEAS - PA PR(ACCEL): 25.1 MMHG
BH CV ECHO MEAS - PA V2 MAX: 86.9 CM/SEC
BH CV ECHO MEAS - SI(CUBED): 31.5 ML/M^2
BH CV ECHO MEAS - SI(LVOT): 39.1 ML/M^2
BH CV ECHO MEAS - SI(TEICH): 27 ML/M^2
BH CV ECHO MEAS - SV(CUBED): 70.5 ML
BH CV ECHO MEAS - SV(LVOT): 87.4 ML
BH CV ECHO MEAS - SV(TEICH): 60.4 ML
BH CV ECHO MEASUREMENTS AVERAGE E/E' RATIO: 5.43
BH CV VAS BP RIGHT ARM: NORMAL MMHG
BH CV XLRA MEAS LEFT CCA RATIO VEL: 72.7 CM/SEC
BH CV XLRA MEAS LEFT DIST CCA EDV: 32.9 CM/SEC
BH CV XLRA MEAS LEFT DIST CCA PSV: 73.2 CM/SEC
BH CV XLRA MEAS LEFT DIST ICA EDV: 51.1 CM/SEC
BH CV XLRA MEAS LEFT DIST ICA PSV: 94.8 CM/SEC
BH CV XLRA MEAS LEFT ICA RATIO VEL: 75.6 CM/SEC
BH CV XLRA MEAS LEFT ICA/CCA RATIO: 1
BH CV XLRA MEAS LEFT MID CCA EDV: 28.3 CM/SEC
BH CV XLRA MEAS LEFT MID CCA PSV: 75.4 CM/SEC
BH CV XLRA MEAS LEFT MID ICA EDV: 30.9 CM/SEC
BH CV XLRA MEAS LEFT MID ICA PSV: 76.1 CM/SEC
BH CV XLRA MEAS LEFT PROX CCA EDV: 28.3 CM/SEC
BH CV XLRA MEAS LEFT PROX CCA PSV: 73.9 CM/SEC
BH CV XLRA MEAS LEFT PROX ECA PSV: 80 CM/SEC
BH CV XLRA MEAS LEFT PROX ICA EDV: 24.6 CM/SEC
BH CV XLRA MEAS LEFT PROX ICA PSV: 65.8 CM/SEC
BH CV XLRA MEAS LEFT PROX SCLA PSV: 94.1 CM/SEC
BH CV XLRA MEAS LEFT VERTEBRAL A EDV: 20.1 CM/SEC
BH CV XLRA MEAS LEFT VERTEBRAL A PSV: 47.1 CM/SEC
BH CV XLRA MEAS RIGHT CCA RATIO VEL: 64.7 CM/SEC
BH CV XLRA MEAS RIGHT DIST CCA EDV: 24.5 CM/SEC
BH CV XLRA MEAS RIGHT DIST CCA PSV: 65.4 CM/SEC
BH CV XLRA MEAS RIGHT DIST ICA EDV: 39.3 CM/SEC
BH CV XLRA MEAS RIGHT DIST ICA PSV: 76.4 CM/SEC
BH CV XLRA MEAS RIGHT ICA RATIO VEL: 60.4 CM/SEC
BH CV XLRA MEAS RIGHT ICA/CCA RATIO: 0.93
BH CV XLRA MEAS RIGHT MID CCA EDV: 26.4 CM/SEC
BH CV XLRA MEAS RIGHT MID CCA PSV: 72.3 CM/SEC
BH CV XLRA MEAS RIGHT MID ICA EDV: 31.9 CM/SEC
BH CV XLRA MEAS RIGHT MID ICA PSV: 60.9 CM/SEC
BH CV XLRA MEAS RIGHT PROX CCA EDV: 32.2 CM/SEC
BH CV XLRA MEAS RIGHT PROX CCA PSV: 88.8 CM/SEC
BH CV XLRA MEAS RIGHT PROX ECA PSV: 96.7 CM/SEC
BH CV XLRA MEAS RIGHT PROX ICA EDV: 29.5 CM/SEC
BH CV XLRA MEAS RIGHT PROX ICA PSV: 59.7 CM/SEC
BH CV XLRA MEAS RIGHT PROX SCLA PSV: 108.4 CM/SEC
BH CV XLRA MEAS RIGHT VERTEBRAL A EDV: 18.7 CM/SEC
BH CV XLRA MEAS RIGHT VERTEBRAL A PSV: 40.3 CM/SEC
BUN BLD-MCNC: 17 MG/DL (ref 6–20)
BUN/CREAT SERPL: 10.8 (ref 7–25)
BUPRENORPHINE SERPL-MCNC: NEGATIVE NG/ML
CA-I BLDA-SCNC: 1.25 MMOL/L (ref 1.2–1.32)
CALCIUM SPEC-SCNC: 9.7 MG/DL (ref 8.6–10.5)
CANNABINOIDS SERPL QL: NEGATIVE
CHLORIDE SERPL-SCNC: 102 MMOL/L (ref 98–107)
CO2 BLDA-SCNC: 25 MMOL/L (ref 24–29)
CO2 SERPL-SCNC: 23 MMOL/L (ref 22–29)
COCAINE UR QL: NEGATIVE
CREAT BLD-MCNC: 1.58 MG/DL (ref 0.76–1.27)
CREAT BLDA-MCNC: 1.7 MG/DL (ref 0.6–1.3)
DEPRECATED RDW RBC AUTO: 42.1 FL (ref 37–54)
EOSINOPHIL # BLD AUTO: 0.35 10*3/MM3 (ref 0–0.4)
EOSINOPHIL NFR BLD AUTO: 3.4 % (ref 0.3–6.2)
ERYTHROCYTE [DISTWIDTH] IN BLOOD BY AUTOMATED COUNT: 12.9 % (ref 12.3–15.4)
FOLATE SERPL-MCNC: 6.59 NG/ML (ref 4.78–24.2)
GFR SERPL CREATININE-BSD FRML MDRD: 47 ML/MIN/1.73
GLUCOSE BLD-MCNC: 97 MG/DL (ref 65–99)
GLUCOSE BLDC GLUCOMTR-MCNC: 146 MG/DL (ref 70–130)
HCO3 BLDA-SCNC: 24.2 MMOL/L (ref 22–26)
HCT VFR BLD AUTO: 48.6 % (ref 37.5–51)
HCT VFR BLDA CALC: 48 % (ref 38–51)
HCYS SERPL-MCNC: 12.8 UMOL/L (ref 0–15)
HGB BLD-MCNC: 16.3 G/DL (ref 13–17.7)
HGB BLDA-MCNC: 16.3 G/DL (ref 12–17)
HOLD SPECIMEN: NORMAL
HOLD SPECIMEN: NORMAL
IMM GRANULOCYTES # BLD AUTO: 0.03 10*3/MM3 (ref 0–0.05)
IMM GRANULOCYTES NFR BLD AUTO: 0.3 % (ref 0–0.5)
INR PPP: 1.1 (ref 0.8–1.2)
LV EF 2D ECHO EST: 60 %
LYMPHOCYTES # BLD AUTO: 2.37 10*3/MM3 (ref 0.7–3.1)
LYMPHOCYTES NFR BLD AUTO: 23.3 % (ref 19.6–45.3)
MAXIMAL PREDICTED HEART RATE: 172 BPM
MCH RBC QN AUTO: 29.9 PG (ref 26.6–33)
MCHC RBC AUTO-ENTMCNC: 33.5 G/DL (ref 31.5–35.7)
MCV RBC AUTO: 89 FL (ref 79–97)
METHADONE UR QL SCN: NEGATIVE
MONOCYTES # BLD AUTO: 1.05 10*3/MM3 (ref 0.1–0.9)
MONOCYTES NFR BLD AUTO: 10.3 % (ref 5–12)
NEUTROPHILS # BLD AUTO: 6.29 10*3/MM3 (ref 1.7–7)
NEUTROPHILS NFR BLD AUTO: 61.9 % (ref 42.7–76)
NRBC BLD AUTO-RTO: 0 /100 WBC (ref 0–0.2)
OPIATES UR QL: NEGATIVE
OXYCODONE UR QL SCN: POSITIVE
PCO2 BLDA: 44.5 MM HG (ref 35–45)
PCP UR QL SCN: NEGATIVE
PH BLDA: 7.34 PH UNITS (ref 7.35–7.6)
PLATELET # BLD AUTO: 261 10*3/MM3 (ref 140–450)
PMV BLD AUTO: 10.3 FL (ref 6–12)
PO2 BLDA: 25 MMHG (ref 80–105)
POTASSIUM BLD-SCNC: 4.7 MMOL/L (ref 3.5–5.2)
POTASSIUM BLDA-SCNC: 4.2 MMOL/L (ref 3.5–4.9)
PROPOXYPH UR QL: NEGATIVE
PROTHROMBIN TIME: 13.3 SECONDS (ref 12.8–15.2)
RBC # BLD AUTO: 5.46 10*6/MM3 (ref 4.14–5.8)
SAO2 % BLDA: 41 % (ref 95–98)
SODIUM BLD-SCNC: 138 MMOL/L (ref 136–145)
SODIUM BLDA-SCNC: 139 MMOL/L (ref 138–146)
STRESS TARGET HR: 146 BPM
T4 FREE SERPL-MCNC: 1.49 NG/DL (ref 0.93–1.7)
TRICYCLICS UR QL SCN: NEGATIVE
TROPONIN T SERPL-MCNC: 0.07 NG/ML (ref 0–0.03)
VIT B12 BLD-MCNC: 920 PG/ML (ref 211–946)
WBC NRBC COR # BLD: 10.17 10*3/MM3 (ref 3.4–10.8)
WHOLE BLOOD HOLD SPECIMEN: NORMAL
WHOLE BLOOD HOLD SPECIMEN: NORMAL

## 2020-03-22 PROCEDURE — 85610 PROTHROMBIN TIME: CPT

## 2020-03-22 PROCEDURE — 82330 ASSAY OF CALCIUM: CPT

## 2020-03-22 PROCEDURE — 82947 ASSAY GLUCOSE BLOOD QUANT: CPT

## 2020-03-22 PROCEDURE — 25010000002 HEPARIN (PORCINE) PER 1000 UNITS: Performed by: INTERNAL MEDICINE

## 2020-03-22 PROCEDURE — 93005 ELECTROCARDIOGRAM TRACING: CPT | Performed by: EMERGENCY MEDICINE

## 2020-03-22 PROCEDURE — 84484 ASSAY OF TROPONIN QUANT: CPT | Performed by: EMERGENCY MEDICINE

## 2020-03-22 PROCEDURE — 84460 ALANINE AMINO (ALT) (SGPT): CPT | Performed by: EMERGENCY MEDICINE

## 2020-03-22 PROCEDURE — 70496 CT ANGIOGRAPHY HEAD: CPT

## 2020-03-22 PROCEDURE — 85730 THROMBOPLASTIN TIME PARTIAL: CPT | Performed by: EMERGENCY MEDICINE

## 2020-03-22 PROCEDURE — 93306 TTE W/DOPPLER COMPLETE: CPT

## 2020-03-22 PROCEDURE — 70450 CT HEAD/BRAIN W/O DYE: CPT

## 2020-03-22 PROCEDURE — 0042T HC CT CEREBRAL PERFUSION W/WO CONTRAST: CPT

## 2020-03-22 PROCEDURE — 70551 MRI BRAIN STEM W/O DYE: CPT

## 2020-03-22 PROCEDURE — 80048 BASIC METABOLIC PNL TOTAL CA: CPT | Performed by: INTERNAL MEDICINE

## 2020-03-22 PROCEDURE — 82746 ASSAY OF FOLIC ACID SERUM: CPT | Performed by: PSYCHIATRY & NEUROLOGY

## 2020-03-22 PROCEDURE — 99223 1ST HOSP IP/OBS HIGH 75: CPT | Performed by: INTERNAL MEDICINE

## 2020-03-22 PROCEDURE — 85014 HEMATOCRIT: CPT

## 2020-03-22 PROCEDURE — 82803 BLOOD GASES ANY COMBINATION: CPT

## 2020-03-22 PROCEDURE — 99285 EMERGENCY DEPT VISIT HI MDM: CPT

## 2020-03-22 PROCEDURE — 85025 COMPLETE CBC W/AUTO DIFF WBC: CPT | Performed by: EMERGENCY MEDICINE

## 2020-03-22 PROCEDURE — 86147 CARDIOLIPIN ANTIBODY EA IG: CPT | Performed by: PSYCHIATRY & NEUROLOGY

## 2020-03-22 PROCEDURE — 82607 VITAMIN B-12: CPT | Performed by: INTERNAL MEDICINE

## 2020-03-22 PROCEDURE — 84295 ASSAY OF SERUM SODIUM: CPT

## 2020-03-22 PROCEDURE — 84450 TRANSFERASE (AST) (SGOT): CPT | Performed by: EMERGENCY MEDICINE

## 2020-03-22 PROCEDURE — 99253 IP/OBS CNSLTJ NEW/EST LOW 45: CPT | Performed by: PSYCHIATRY & NEUROLOGY

## 2020-03-22 PROCEDURE — 93880 EXTRACRANIAL BILAT STUDY: CPT

## 2020-03-22 PROCEDURE — 71045 X-RAY EXAM CHEST 1 VIEW: CPT

## 2020-03-22 PROCEDURE — 70498 CT ANGIOGRAPHY NECK: CPT

## 2020-03-22 PROCEDURE — 84439 ASSAY OF FREE THYROXINE: CPT | Performed by: PSYCHIATRY & NEUROLOGY

## 2020-03-22 PROCEDURE — 83090 ASSAY OF HOMOCYSTEINE: CPT | Performed by: PSYCHIATRY & NEUROLOGY

## 2020-03-22 PROCEDURE — 86592 SYPHILIS TEST NON-TREP QUAL: CPT | Performed by: PSYCHIATRY & NEUROLOGY

## 2020-03-22 PROCEDURE — 84132 ASSAY OF SERUM POTASSIUM: CPT

## 2020-03-22 PROCEDURE — 80306 DRUG TEST PRSMV INSTRMNT: CPT | Performed by: PSYCHIATRY & NEUROLOGY

## 2020-03-22 PROCEDURE — 0 IOPAMIDOL PER 1 ML: Performed by: EMERGENCY MEDICINE

## 2020-03-22 PROCEDURE — 82565 ASSAY OF CREATININE: CPT

## 2020-03-22 RX ORDER — SODIUM CHLORIDE 0.9 % (FLUSH) 0.9 %
10 SYRINGE (ML) INJECTION EVERY 12 HOURS SCHEDULED
Status: DISCONTINUED | OUTPATIENT
Start: 2020-03-22 | End: 2020-03-25 | Stop reason: HOSPADM

## 2020-03-22 RX ORDER — OXYCODONE HYDROCHLORIDE AND ACETAMINOPHEN 5; 325 MG/1; MG/1
1 TABLET ORAL EVERY 6 HOURS PRN
Status: DISCONTINUED | OUTPATIENT
Start: 2020-03-22 | End: 2020-03-25 | Stop reason: HOSPADM

## 2020-03-22 RX ORDER — FAMOTIDINE 20 MG/1
40 TABLET, FILM COATED ORAL NIGHTLY
Status: DISCONTINUED | OUTPATIENT
Start: 2020-03-22 | End: 2020-03-22

## 2020-03-22 RX ORDER — ACETAMINOPHEN 160 MG/5ML
650 SOLUTION ORAL EVERY 4 HOURS PRN
Status: DISCONTINUED | OUTPATIENT
Start: 2020-03-22 | End: 2020-03-25 | Stop reason: HOSPADM

## 2020-03-22 RX ORDER — ACETAMINOPHEN 650 MG/1
650 SUPPOSITORY RECTAL EVERY 4 HOURS PRN
Status: DISCONTINUED | OUTPATIENT
Start: 2020-03-22 | End: 2020-03-25 | Stop reason: HOSPADM

## 2020-03-22 RX ORDER — ATORVASTATIN CALCIUM 40 MG/1
80 TABLET, FILM COATED ORAL NIGHTLY
Status: DISCONTINUED | OUTPATIENT
Start: 2020-03-22 | End: 2020-03-25 | Stop reason: HOSPADM

## 2020-03-22 RX ORDER — ACETAMINOPHEN 325 MG/1
650 TABLET ORAL EVERY 4 HOURS PRN
Status: DISCONTINUED | OUTPATIENT
Start: 2020-03-22 | End: 2020-03-25 | Stop reason: HOSPADM

## 2020-03-22 RX ORDER — CLOPIDOGREL BISULFATE 75 MG/1
75 TABLET ORAL DAILY
Status: DISCONTINUED | OUTPATIENT
Start: 2020-03-23 | End: 2020-03-25 | Stop reason: HOSPADM

## 2020-03-22 RX ORDER — ASPIRIN 300 MG/1
300 SUPPOSITORY RECTAL DAILY
Status: DISCONTINUED | OUTPATIENT
Start: 2020-03-23 | End: 2020-03-25 | Stop reason: HOSPADM

## 2020-03-22 RX ORDER — ESCITALOPRAM OXALATE 10 MG/1
10 TABLET ORAL DAILY
Status: DISCONTINUED | OUTPATIENT
Start: 2020-03-22 | End: 2020-03-25 | Stop reason: HOSPADM

## 2020-03-22 RX ORDER — CHOLECALCIFEROL (VITAMIN D3) 125 MCG
5 CAPSULE ORAL NIGHTLY PRN
Status: DISCONTINUED | OUTPATIENT
Start: 2020-03-22 | End: 2020-03-25 | Stop reason: HOSPADM

## 2020-03-22 RX ORDER — ASPIRIN 300 MG/1
300 SUPPOSITORY RECTAL ONCE
Status: DISCONTINUED | OUTPATIENT
Start: 2020-03-22 | End: 2020-03-22

## 2020-03-22 RX ORDER — SODIUM CHLORIDE 0.9 % (FLUSH) 0.9 %
10 SYRINGE (ML) INJECTION AS NEEDED
Status: DISCONTINUED | OUTPATIENT
Start: 2020-03-22 | End: 2020-03-25 | Stop reason: HOSPADM

## 2020-03-22 RX ORDER — FAMOTIDINE 20 MG/1
20 TABLET, FILM COATED ORAL NIGHTLY
Status: DISCONTINUED | OUTPATIENT
Start: 2020-03-22 | End: 2020-03-25 | Stop reason: HOSPADM

## 2020-03-22 RX ORDER — ONDANSETRON 4 MG/1
4 TABLET, FILM COATED ORAL EVERY 6 HOURS PRN
Status: DISCONTINUED | OUTPATIENT
Start: 2020-03-22 | End: 2020-03-25 | Stop reason: HOSPADM

## 2020-03-22 RX ORDER — HEPARIN SODIUM 5000 [USP'U]/ML
5000 INJECTION, SOLUTION INTRAVENOUS; SUBCUTANEOUS EVERY 8 HOURS SCHEDULED
Status: DISCONTINUED | OUTPATIENT
Start: 2020-03-22 | End: 2020-03-25 | Stop reason: HOSPADM

## 2020-03-22 RX ORDER — PANTOPRAZOLE SODIUM 40 MG/1
40 TABLET, DELAYED RELEASE ORAL DAILY
COMMUNITY

## 2020-03-22 RX ORDER — CETIRIZINE HYDROCHLORIDE 10 MG/1
10 TABLET ORAL DAILY PRN
Status: DISCONTINUED | OUTPATIENT
Start: 2020-03-22 | End: 2020-03-25 | Stop reason: HOSPADM

## 2020-03-22 RX ORDER — FAMOTIDINE 20 MG/1
20 TABLET, FILM COATED ORAL 2 TIMES DAILY PRN
Status: DISCONTINUED | OUTPATIENT
Start: 2020-03-22 | End: 2020-03-25 | Stop reason: HOSPADM

## 2020-03-22 RX ORDER — PANTOPRAZOLE SODIUM 40 MG/1
40 TABLET, DELAYED RELEASE ORAL
Status: DISCONTINUED | OUTPATIENT
Start: 2020-03-23 | End: 2020-03-25 | Stop reason: HOSPADM

## 2020-03-22 RX ORDER — ASPIRIN 81 MG/1
324 TABLET, CHEWABLE ORAL ONCE
Status: COMPLETED | OUTPATIENT
Start: 2020-03-22 | End: 2020-03-22

## 2020-03-22 RX ORDER — DOCUSATE SODIUM 100 MG/1
100 CAPSULE, LIQUID FILLED ORAL 2 TIMES DAILY PRN
Status: DISCONTINUED | OUTPATIENT
Start: 2020-03-22 | End: 2020-03-25 | Stop reason: HOSPADM

## 2020-03-22 RX ORDER — ONDANSETRON 2 MG/ML
4 INJECTION INTRAMUSCULAR; INTRAVENOUS EVERY 6 HOURS PRN
Status: DISCONTINUED | OUTPATIENT
Start: 2020-03-22 | End: 2020-03-25 | Stop reason: HOSPADM

## 2020-03-22 RX ORDER — ASPIRIN 325 MG
325 TABLET ORAL DAILY
Status: DISCONTINUED | OUTPATIENT
Start: 2020-03-23 | End: 2020-03-25 | Stop reason: HOSPADM

## 2020-03-22 RX ORDER — AZELASTINE 1 MG/ML
2 SPRAY, METERED NASAL DAILY
Status: DISCONTINUED | OUTPATIENT
Start: 2020-03-22 | End: 2020-03-25 | Stop reason: HOSPADM

## 2020-03-22 RX ORDER — DICYCLOMINE HYDROCHLORIDE 10 MG/1
10 CAPSULE ORAL
COMMUNITY
End: 2020-05-21

## 2020-03-22 RX ORDER — GABAPENTIN 300 MG/1
300 CAPSULE ORAL EVERY 12 HOURS SCHEDULED
Status: DISCONTINUED | OUTPATIENT
Start: 2020-03-22 | End: 2020-03-22

## 2020-03-22 RX ORDER — DICYCLOMINE HCL 20 MG
10 TABLET ORAL
Status: DISCONTINUED | OUTPATIENT
Start: 2020-03-22 | End: 2020-03-25 | Stop reason: HOSPADM

## 2020-03-22 RX ORDER — CLOPIDOGREL BISULFATE 75 MG/1
300 TABLET ORAL ONCE
Status: COMPLETED | OUTPATIENT
Start: 2020-03-22 | End: 2020-03-22

## 2020-03-22 RX ADMIN — Medication 5 MG: at 20:26

## 2020-03-22 RX ADMIN — DICYCLOMINE HYDROCHLORIDE 10 MG: 20 TABLET ORAL at 17:21

## 2020-03-22 RX ADMIN — SODIUM CHLORIDE, PRESERVATIVE FREE 10 ML: 5 INJECTION INTRAVENOUS at 12:50

## 2020-03-22 RX ADMIN — SODIUM CHLORIDE, PRESERVATIVE FREE 10 ML: 5 INJECTION INTRAVENOUS at 20:25

## 2020-03-22 RX ADMIN — ATORVASTATIN CALCIUM 80 MG: 40 TABLET, FILM COATED ORAL at 20:25

## 2020-03-22 RX ADMIN — OXYCODONE HYDROCHLORIDE AND ACETAMINOPHEN 1 TABLET: 5; 325 TABLET ORAL at 20:26

## 2020-03-22 RX ADMIN — ESCITALOPRAM OXALATE 10 MG: 10 TABLET ORAL at 12:50

## 2020-03-22 RX ADMIN — HEPARIN SODIUM 5000 UNITS: 5000 INJECTION, SOLUTION INTRAVENOUS; SUBCUTANEOUS at 12:50

## 2020-03-22 RX ADMIN — ASPIRIN 81 MG 324 MG: 81 TABLET ORAL at 12:50

## 2020-03-22 RX ADMIN — HEPARIN SODIUM 5000 UNITS: 5000 INJECTION, SOLUTION INTRAVENOUS; SUBCUTANEOUS at 20:25

## 2020-03-22 RX ADMIN — IOPAMIDOL 115 ML: 755 INJECTION, SOLUTION INTRAVENOUS at 11:31

## 2020-03-22 RX ADMIN — CLOPIDOGREL BISULFATE 300 MG: 75 TABLET ORAL at 12:50

## 2020-03-22 RX ADMIN — FAMOTIDINE 20 MG: 20 TABLET ORAL at 20:26

## 2020-03-22 NOTE — SIGNIFICANT NOTE
03/22/20 1357   SLP Deferred Reason   SLP Deferred Reason Patient unavailable for evaluation  (Stroke consult received. Pt getting echo at time of attempt and then going for MRI. Will f/u tomorrow for communication eval. Pt passed swallow screen per NSG.)

## 2020-03-22 NOTE — PLAN OF CARE
Problem: Patient Care Overview  Goal: Plan of Care Review  Outcome: Ongoing (interventions implemented as appropriate)  Flowsheets (Taken 3/22/2020 1607)  Plan of Care Reviewed With: patient  Note:   Patient arrived to floor around noon. NIH-4. Spouse at bedside. Patient has difficulty with word finding and communicating. If given time and reminded to think about what he is trying to say, he does better. Patient recently had surgery on his R elbow, bandage is in place by physician and was told to leave bandage in place until his follow up appointment on the 31st. WOC consult put in. Continuing to monitor      Problem: Patient Care Overview  Goal: Individualization and Mutuality  Outcome: Ongoing (interventions implemented as appropriate)

## 2020-03-22 NOTE — NURSING NOTE
Stroke Navigator CODE STROKE    HPI    Lux Mckoy is a 49 y.o.  male on whom I am evaluating as a Code Stroke for a possible acute stroke.  Mr. Mckoy presents to our facility via private vehicle with complaints of new neurological problems; confusion, difficulty speaking, right sided numbness, and ataxic gait.  He is accompanied by his wife who reports that she first noticed his symptoms yesterday at approximately 1100, however he refused to seek medical evaluation.  She states that his symptoms somewhat improved throughout the day yesterday but then became worse again today prompting them to seek further evaluation.    Of note the patient had surgery on his right arm on 3/12/2020.     Code Stroke location: ED    · Last known well: just prior to 1100 on 3/21/2020    · GCS: 14  · Baseline level of function known: yes. Modified Jaden: 0   · Current symptoms include; extremity weakness, extremity numbness, facial droop, receptive aphasia and expressive aphasia, affecting primarily the right face, upper extremity and lower extremity. Symptoms are currently unchanged.   · Patient is not a candidate for thrombolytic therapy due to LKW >4.5 hours  · Patient is not a candidate for Neuro Intervention due to no LVO (large vessel occlusion) present on CT perfusion scan.  There is decreased flow in the left posterior region which is consistent with a distal cortical MCA stroke not amendable to neuro interventional treatment; this was reviewed by Dr. Coombs.    Stroke risk factors: hyperlipidemia and hypertension.     Prior stroke history: no    Antiplatelet therapy: none  Anticoagulation: none     · Imaging performed: CT head, CTA head, CTA neck and CT perfusion      NIHSS    Interval: baseline  1a. Level of Consciousness: 0-->Alert, keenly responsive  1b. LOC Questions: 2-->Answers neither question correctly  1c. LOC Commands: 0-->Performs both tasks correctly  2. Best Gaze: 0-->Normal  3. Visual: 0-->No visual loss  4.  Facial Palsy: 1-->Minor paralysis (flattened nasolabial fold, asymmetry on smiling)  5a. Motor Arm, Left: 0-->No drift, limb holds 90 (or 45) degrees for full 10 secs  5b. Motor Arm, Right: 0-->No drift, limb holds 90 (or 45) degrees for full 10 secs  6a. Motor Leg, Left: 0-->No drift, leg holds 30 degree position for full 5 secs  6b. Motor Leg, Right: 1-->Drift, leg falls by the end of the 5-sec period but does not hit bed  7. Limb Ataxia: 0-->Absent  8. Sensory: 1-->Mild-to-moderate sensory loss, patient feels pinprick is less sharp or is dull on the affected side, or there is a loss of superficial pain with pinprick, but patient is aware of being touched  9. Best Language: 1-->Mild-to-moderate aphasia, some obvious loss of fluency or facility of comprehension, without significant limitation on ideas expressed or form of expression. Reduction of speech and/or comprehension, however, makes conversation. . . (see row details)  10. Dysarthria: 0-->Normal  11. Extinction and Inattention (formerly Neglect): 0-->No abnormality    Total (NIH Stroke Scale): 6    ASSESSMENT    General: Well-developed  male in no apparent distress  Resp: Regular rate and rhythm patient is on room air  Cardiac: Normal rate, regular rhythm, denies palpitations  Nuero: The patient is alert, pleasantly confused, and follows simple commands.  Pupils are equal and round.  EOM are intact.  The patient denies blurry vision but does report experiencing some difficulty seeing out of his left eye.  Upon my exam visual fields appear to be intact.  Slight left facial droop is present.  Speech is clear, however the patient has episodes of loss of fluency with conversation and intermittent episodes of receptive aphasia.  He moves all extremities bilaterally; there is a slight drift noted in his right lower extremity.  He reports sensory loss in in his right face, arm, and leg.  Finger-to-nose and heel-to-shin exam is normal.  Balance was not  tested however the wife reports he had an ataxic gait.    PLAN    There is evidence of a distal left cortical MCA ischemic stroke on the CT perfusion scan that is not amenable to neuro intervention, this was reviewed by Dr. Coombs.      I have discussed the patient's clinical exam and plan with Dr. Larson.     He will require admission to the hospital for further stroke evaluation.    TIA/Ischemic Stroke Order Set (without thrombolytic therapy) has been initiated.  Patient received ASA 325mg in ED. Will order loading dose of Plavix 300 mg today, followed by 75 mg daily starting tomorrow.    MRI brain without to further evaluate extent of stroke.    This was discussed with the patient and his wife who is currently at the bedside.  They are in agreement with current plan of care.  Dr. Womack to speak with hospitalist for admission.    Peyton Mead RN EXTENDER/STROKE NAVIGATOR

## 2020-03-22 NOTE — H&P
[facility] Medicine Services  HISTORY AND PHYSICAL    Patient Name: Lux Mckoy  : 1970  MRN: [id number]  Primary Care Physician: Ozzie Castro MD  Date of admission: 3/22/2020      Subjective   Subjective     Chief Complaint:  Follow-up confusion and difficulty speaking    HPI:  Lux Mckoy is a 49 y.o. male with past medical history of tobacco use, stage III chronic kidney disease, depression, and other problems listed below who presents the hospital after developing confusion, difficulty speaking, difficulty putting words together, right-sided numbness, and ataxic gait that has been present for approximately 36 hours.  He presented the emergency room with his wife after the symptoms persisted.  She noted he had a transient episode of nausea yesterday without vomiting that is now resolved.  Patient is unable to provide much history due to aphasia and confusion.  In the emergency room he was found to have acute ischemic stroke and is being admitted to telemetry [facility] medicine for further medical management    Review of Systems   Constitutional: Negative for chills, fatigue and fever.   HENT: Negative for sinus pressure and sinus pain.    Eyes: Negative.    Respiratory: Negative for cough and shortness of breath.    Cardiovascular: Negative for chest pain and palpitations.   Gastrointestinal: Positive for nausea. Negative for diarrhea and vomiting.   Endocrine: Negative.    Genitourinary: Negative for dysuria and hematuria.   Musculoskeletal: Negative for neck pain and neck stiffness.   Skin: Negative for rash and wound.   Allergic/Immunologic: Negative.    Neurological: Negative for light-headedness and numbness.   Hematological: Negative for adenopathy. Does not bruise/bleed easily.   Psychiatric/Behavioral: Positive for confusion. The patient is nervous/anxious.    Questions answered yes or no      Personal History     Past Medical History:   Diagnosis  "Date   • Dupuytren contracture     s/p bilateral hand surgery   • Dyspnea    • Kidney infection    • Pancreatitis        Past Surgical History:   Procedure Laterality Date   • CERVICAL DISCECTOMY ANTERIOR  02/2017    C6-7 at [facility] in [place]   • HAND SURGERY     • LUMBAR DISCECTOMY      \"Spinal\" times 2 in 2003 and 2005   • UMBILICAL HERNIA REPAIR      Dr. Diego Vasquez       Family History: family history includes Hypertension in an other family member; Other in an other family member.  Reports father has had no myocardial infarction    Social History:  reports that he has been smoking. He has been smoking about 0.50 packs per day. He does not have any smokeless tobacco history on file.  Social History     Social History Narrative   • Not on file       Medications:  Available home medication information reviewed.  Medications Prior to Admission   Medication Sig Dispense Refill Last Dose   • azelastine (ASTEPRO) 0.15 % solution nasal spray 1 Squirt into each nostril 2 (two) times a day.   Taking   • cetirizine (ZyrTEC) 10 MG tablet Take 1 tablet by mouth Daily As Needed for allergies. 30 tablet 2 Taking   • famotidine (PEPCID) 40 MG tablet every night.  0 Taking   • gabapentin (NEURONTIN) 600 MG tablet TAKE 1 TABLET BY MOUTH TWICE DAILY. 60 tablet 1 Taking   • lisinopril (PRINIVIL,ZESTRIL) 2.5 MG tablet TAKE 1 TABLET BY MOUTH ONCE DAILY. 90 tablet 1 Taking   • oxyCODONE-acetaminophen (PERCOCET) 7.5-325 MG per tablet Every 4 (Four) Hours As Needed.  0 Taking       Allergies   Allergen Reactions   • Augmentin [Amoxicillin-Pot Clavulanate] Itching   • Bactrim [Sulfamethoxazole-Trimethoprim] Itching       Objective   Objective     Vital Signs:   Temp:  [97.4 °F (36.3 °C)-98.2 °F (36.8 °C)] 97.4 °F (36.3 °C)  Heart Rate:  [69-76] 69  Resp:  [16-18] 16  BP: (131-139)/() 131/96   Total (NIH Stroke Scale): 4    Physical Exam   Constitutional: Awake, alert  Eyes: PERRLA, sclerae anicteric, no conjunctival " injection  HENT: NCAT, mucous membranes moist  Neck: Supple, no thyromegaly, no lymphadenopathy, trachea midline  Respiratory: Clear to auscultation bilaterally, nonlabored respirations   Cardiovascular: RRR, no murmurs, rubs, or gallops, palpable pedal pulses bilaterally  Gastrointestinal: Positive bowel sounds, soft, nontender, nondistended  Musculoskeletal: No bilateral ankle edema, no clubbing or cyanosis to extremities  Psychiatric: Appropriate affect, cooperative  Neurologic: Word finding difficulty, frequent speaking be incorrect word, able to answer some questions yes or no, assessing orientation is difficult, no facial droop, reports right-sided numbness  Skin: No rashes or jaundice    Results Reviewed:  I have personally reviewed current lab and radiology data.    Results from last 7 days   Lab Units 03/22/20  1137  03/22/20  1119   WBC 10*3/mm3 10.17  --   --    HEMOGLOBIN g/dL 16.3  --   --    HEMOGLOBIN, POC   --    < >  --    HEMATOCRIT % 48.6  --   --    HEMATOCRIT POC   --    < >  --    PLATELETS 10*3/mm3 261  --   --    INR   --   --  1.1    < > = values in this interval not displayed.     Results from last 7 days   Lab Units 03/22/20  1137 03/22/20  1125   CREATININE mg/dL  --  1.70*   ALT (SGPT) U/L 27  --    AST (SGOT) U/L 27  --    TROPONIN T ng/mL 0.070*  --      Estimated Creatinine Clearance: 68.2 mL/min (A) (by C-G formula based on SCr of 1.7 mg/dL (H)).  Brief Urine Lab Results     None        Imaging Results (Last 24 Hours)     Procedure Component Value Units Date/Time    XR Chest 1 View [[id number]] Resulted:  03/22/20 1112     Updated:  03/22/20 1230    CT Angiogram Neck [[id number]] Collected:  03/22/20 1138     Updated:  03/22/20 1230    Narrative:       EXAMINATION: CT ANGIOGRAM NECK, CT ANGIOGRAM HEAD - 03/22/2020     INDICATION: CVA, confusion, abnormal CT head.      TECHNIQUE: CT angiogram head and neck with intravenous contrast. 2D and  3D reconstructions performed.     The  radiation dose reduction device was turned on for each scan per the  ALARA (As Low as Reasonably Achievable) protocol.     COMPARISON: CT head noncontrast and CT cerebral perfusion performed  concurrently     FINDINGS:      CTA NECK: Normal 3-vessel arch with patent great vessel origins.  Proximal subclavian arteries are patent. Vertebral arteries demonstrate  grossly symmetric caliber without focal severe stenosis, aneurysm or  occlusion through the cervical segments. Carotids demonstrate grossly  normal course and branching pattern with only minimal atherosclerotic  involvement noncalcified plaque formations at the bifurcations with less  than 10% right and 10% left luminal narrowing as measured by NASCET  criteria with distal internal carotid arteries patent to the  intracranial segments as discussed further below in the dedicated CTA  head portion. Cervical soft tissues unremarkable and without bulky  cervical adenopathy or abnormality of the lung apices.     CTA HEAD: Distal internal carotid arteries demonstrate minimal  atherosclerotic involvement including minimal calcific disease producing  mild luminal stenosis without high-grade stenosis, aneurysm or  occlusion. Anterior cerebral arteries are patent without hemodynamically  significant stenosis, aneurysm or occlusion. Middle cerebral arteries  without large vessel occlusion, however mild irregularities in the  bilateral M3 and M4 segments, left greater than right without distinct  occlusion evident. Vertebrobasilar system and posterior cerebral  arteries are patent without hemodynamically significant stenosis,  aneurysm or occlusion. Visualized venous structures including superior  sagittal sinus patent.       Impression:       No large vessel occlusion including only minimal  atherosclerotic involvement of the carotid bifurcations, however within  the MCA distributions bilaterally, there are mild to moderate  irregularities concerning for atherosclerotic  involvement without  distinct occlusion left greater than right of the M3 and M4 segments in  particular.     DICTATED:   03/22/2020  EDITED/ls :   03/22/2020         This report was finalized on 3/22/2020 12:27 PM by Dr. Kenneth Durand.       CT Cerebral Perfusion With & Without Contrast [[id number]] Collected:  03/22/20 1134     Updated:  03/22/20 1230    Narrative:       EXAMINATION: CT CEREBRAL PERFUSION WWO CONTRAST - 03/22/2020     INDICATION: CVA, confusion, abnormal CT head.     TECHNIQUE: CT cerebral perfusion with and without intravenous contrast.  Multiple parametric maps including mean transit time, time to drain,  cerebral blood flow and cerebral blood volume performed.     The radiation dose reduction device was turned on for each scan per the  ALARA (As Low as Reasonably Achievable) protocol.     COMPARISON: CT stroke head noncontrast performed immediately prior.     FINDINGS: Abnormal perfusion with prolongation of mean transit time and  time to drain within the left frontoparietal region distal left MCA  distribution with decreased cerebral blood flow, however cerebral blood  volume preserved consistent with reversible ischemia and no core infarct  element.       Impression:       Reversible ischemia within the left posterior MCA  distribution without evidence for core infarct component.     DICTATED:   03/22/2020  EDITED/ls :   03/22/2020      This report was finalized on 3/22/2020 12:27 PM by Dr. Kenneth Durand.       CT Angiogram Head [[id number]] Collected:  03/22/20 1138     Updated:  03/22/20 1230    Narrative:       EXAMINATION: CT ANGIOGRAM NECK, CT ANGIOGRAM HEAD - 03/22/2020     INDICATION: CVA, confusion, abnormal CT head.      TECHNIQUE: CT angiogram head and neck with intravenous contrast. 2D and  3D reconstructions performed.     The radiation dose reduction device was turned on for each scan per the  ALARA (As Low as Reasonably Achievable) protocol.     COMPARISON: CT head noncontrast and  CT cerebral perfusion performed  concurrently     FINDINGS:      CTA NECK: Normal 3-vessel arch with patent great vessel origins.  Proximal subclavian arteries are patent. Vertebral arteries demonstrate  grossly symmetric caliber without focal severe stenosis, aneurysm or  occlusion through the cervical segments. Carotids demonstrate grossly  normal course and branching pattern with only minimal atherosclerotic  involvement noncalcified plaque formations at the bifurcations with less  than 10% right and 10% left luminal narrowing as measured by NASCET  criteria with distal internal carotid arteries patent to the  intracranial segments as discussed further below in the dedicated CTA  head portion. Cervical soft tissues unremarkable and without bulky  cervical adenopathy or abnormality of the lung apices.     CTA HEAD: Distal internal carotid arteries demonstrate minimal  atherosclerotic involvement including minimal calcific disease producing  mild luminal stenosis without high-grade stenosis, aneurysm or  occlusion. Anterior cerebral arteries are patent without hemodynamically  significant stenosis, aneurysm or occlusion. Middle cerebral arteries  without large vessel occlusion, however mild irregularities in the  bilateral M3 and M4 segments, left greater than right without distinct  occlusion evident. Vertebrobasilar system and posterior cerebral  arteries are patent without hemodynamically significant stenosis,  aneurysm or occlusion. Visualized venous structures including superior  sagittal sinus patent.       Impression:       No large vessel occlusion including only minimal  atherosclerotic involvement of the carotid bifurcations, however within  the MCA distributions bilaterally, there are mild to moderate  irregularities concerning for atherosclerotic involvement without  distinct occlusion left greater than right of the M3 and M4 segments in  particular.     DICTATED:   03/22/2020  EDITED/ls :   03/22/2020          This report was finalized on 3/22/2020 12:27 PM by Dr. Kenneth Durand.       CT Head Without Contrast Stroke Protocol [[id number]] Collected:  03/22/20 1111     Updated:  03/22/20 1230    Narrative:       EXAMINATION: CT HEAD WO CONTRAST - 03/22/2020     INDICATION: Evaluate for stroke.     TECHNIQUE: CT head without intravenous contrast.      The radiation dose reduction device was turned on for each scan per the  ALARA (As Low as Reasonably Achievable) protocol.     COMPARISON: None.     FINDINGS: Midline structures are without midline shift or hydrocephalus,  however asymmetry of abnormal low-attenuation left frontoparietal region  of the left posterior MCA distribution concerning for acute or evolving  infarction with cortical extension and sulcal effacement in this region.  No intra-axial hemorrhage or extra-axial fluid collection. Globes and  orbits unremarkable. Visualized paranasal sinuses and mastoid cells are  grossly clear and well pneumatized. Calvarium intact.       Impression:       Abnormal low-attenuation region left frontoparietal  posterior left MCA distribution involvement extending to involve the  cortex with sulcal effacement concerning for acute or evolving  infarction without intracranial hemorrhage.     Scan performed on 03/22/2020 at 1106 hours. Scan report given to ER  physician and team and made available on 03/22/2020 at 1112 hours.     DICTATED:   03/22/2020  EDITED/ls :   03/22/2020         This report was finalized on 3/22/2020 12:27 PM by Dr. Kenneth Durand.           Results for orders placed during the hospital encounter of 06/27/16   Adult transthoracic echo complete    Narrative · Left ventricular function is normal. Estimated EF = 55%.  · The cardiac valves are anatomically and functionally normal.          Assessment/Plan   Assessment & Plan     Active Hospital Problems    Diagnosis POA   • **Acute ischemic stroke (CMS/HCC) [I63.9] Yes   • Cerebrovascular atherosclerosis seen  on imaging [I70.90] Yes   • Tobacco use [Z72.0] Yes   • CKD (chronic kidney disease) stage 3, GFR 30-59 ml/min (CMS/HCC) [N18.3] Yes   • Chronic pain [G89.29] Yes   • Depression [F32.9] Yes   • Dyslipidemia [E78.5] Yes   • GERD (gastroesophageal reflux disease) [K21.9] Yes   • Neuropathy [G62.9] Yes   • Hypertension [I10] Yes     49-year-old male presents the hospital with word finding and speaking difficulties and right-sided numbness and was found on imaging to have acute ischemic stroke.    Acute ischemic stroke, cerebrovascular atherosclerosis seen on imaging:  Management per stroke protocol.  Aspirin, statin, neurology consult, plan for MRI, assess carotid arteries with imaging, neuro checks, telemetry monitoring.  Speech therapy, PT, OT.    Tobacco use: Cessation counseled.  Reportedly only smoking a few cigarettes a day.      Essential hypertension: Hold lisinopril to allow for permissive hypertension following ischemic stroke.    Stage III chronic kidney disease: BMP currently pending.  Plan to monitor renal function while hospitalized.  Baseline creatinine ranges from 1.5-1.7.    Chronic pain, neuropathy: Decrease gabapentin dose for now.  Monitor mental status.  Decrease Percocet dose.  Careful monitoring and supportive care.    Hyperlipidemia: Check fasting lipid panel tomorrow.  Atorvastatin.    Gastroesophageal reflux disease: H2 blocker.    Depression: Wife reports patient has been off his medication.  Plan to start low-dose Lexapro for now as SSRIs can be beneficial in stroke recovery.    EKG images reviewed sinus rhythm with prolonged AR interval consistent with first-degree AV block.  Wife denies any history of arrhythmia.      DVT prophylaxis: Heparin subcutaneous    CODE STATUS:    Code Status and Medical Interventions:   Ordered at: 03/22/20 1208     Level Of Support Discussed With:    Patient     Code Status:    CPR     Medical Interventions (Level of Support Prior to Arrest):    Full        Admission Status:  I believe this patient meets INPATIENT status due to stroke.  I feel patient’s risk for adverse outcomes and need for care warrant INPATIENT evaluation and I predict the patient’s care encounter to likely last beyond 2 midnights.      Electronically signed by Joe Rasmussen MD, 03/22/20, 12:29 PM.

## 2020-03-22 NOTE — ED PROVIDER NOTES
Subjective   Lux Mckoy is a 49 y.o. male who presents to the ED with c/o neurologic problem. History is limited secondary to acuity of condition and is provided by the patient's spouse. The patient's spouse states that the patient started experiencing slurred speech and confusion at approximately 11:00 yesterday. She notes that throughout the day, he was also experiencing a loss of balance, visual disturbance, and right arm numbness. His symptoms reportedly improved throughout the day, but worsened this morning. She notes that two nights ago, the patient was complaining of chest pain that radiated to his left arm. He has also been experiencing a cough, chills, and shortness of breath for a few days. She notes that he has not had a fever. The patient has a history of an IgA nephropathy. His wife states that he is not on any anticoagulation medications. She states that he smoke one to two cigarettes per day. He has not been exposed to any known positive COVID-19 individuals but has been in public in [facility].         History provided by:  Spouse  History limited by:  Acuity of condition  Neurologic Problem   The patient's primary symptoms include a loss of balance, slurred speech and a visual change. This is a new problem. The current episode started yesterday. The neurological problem developed suddenly. The problem has been waxing and waning since onset. Associated symptoms include chest pain and shortness of breath. Pertinent negatives include no fever.       Review of Systems   Unable to perform ROS: Acuity of condition   Constitutional: Positive for chills. Negative for fever.   Eyes: Positive for visual disturbance.   Respiratory: Positive for cough and shortness of breath.    Cardiovascular: Positive for chest pain.   Musculoskeletal: Positive for gait problem.   Neurological: Positive for speech difficulty, numbness and loss of balance.       Past Medical History:   Diagnosis Date   • Dupuytren  "contracture     s/p bilateral hand surgery   • Dyspnea    • Kidney infection    • Pancreatitis        Allergies   Allergen Reactions   • Augmentin [Amoxicillin-Pot Clavulanate] Itching   • Bactrim [Sulfamethoxazole-Trimethoprim] Itching       Past Surgical History:   Procedure Laterality Date   • CERVICAL DISCECTOMY ANTERIOR  02/2017    C6-7 at [facility] in [place]   • HAND SURGERY     • LUMBAR DISCECTOMY      \"Spinal\" times 2 in 2003 and 2005   • UMBILICAL HERNIA REPAIR      Dr. Diego Vasquez       Family History   Problem Relation Age of Onset   • Hypertension Other    • Other Other        Social History     Socioeconomic History   • Marital status:      Spouse name: Not on file   • Number of children: Not on file   • Years of education: Not on file   • Highest education level: Not on file   Tobacco Use   • Smoking status: Current Every Day Smoker     Packs/day: 0.50         Objective   Physical Exam   Constitutional: He appears well-developed and well-nourished.   HENT:   Head: Normocephalic and atraumatic.   Nose: Nose normal.   Eyes: Conjunctivae are normal. No scleral icterus.   Neck: Normal range of motion. Neck supple.   Cardiovascular: Normal rate, regular rhythm and normal heart sounds.   Pulmonary/Chest: Effort normal. No respiratory distress.   Musculoskeletal: Normal range of motion.   Neurological: He is alert.   NIH score of at least 6- see stroke navigator report for exact NIH reading. He is ataxic with his right upper extremity. Decreased fine touch sensation to the right lower extremity and right upper extremity. There is aphasia, but no dysarthria. Drift noted in the right lower extremity.   Skin: Skin is warm and dry.   Nursing note and vitals reviewed.      Critical Care  Performed by: Zi Womack MD  Authorized by: Zi Womack MD     Critical care provider statement:     Critical care time (minutes):  30    Critical care start time:  3/22/2020 11:21 AM    Critical care end time: "  3/22/2020 11:51 AM    Critical care time was exclusive of:  Separately billable procedures and treating other patients    Critical care was necessary to treat or prevent imminent or life-threatening deterioration of the following conditions:  CNS failure or compromise    Critical care was time spent personally by me on the following activities:  Discussions with consultants, development of treatment plan with patient or surrogate, examination of patient, review of old charts, ordering and review of laboratory studies, ordering and review of radiographic studies, ordering and performing treatments and interventions, re-evaluation of patient's condition, obtaining history from patient or surrogate, pulse oximetry and evaluation of patient's response to treatment    I assumed direction of critical care for this patient from another provider in my specialty: no    Comments:      The patient was attended to immediately upon arrival. IV TPA was considered, but the last known normal was greater than 4.5 hours ago. Cath lab intervention was also considered and the necessary testings including CT of the head and neck, and CTA perfusion scan were performed. Dr. Womack spoke to Dr. Coombs, who feels that the patient does not meet criteria for cath lab intervention secondary to the size of core infarct.              ED Course  ED Course as of Mar 22 1230   Sun Mar 22, 2020   1141 Dr. Womack paged the hospitalist.     [SK]   1142 Dr. Womack discussed the case in detail with Dr. Rasmussen, hospitalist.     [SK]   1221 COVID-19 RISK SCREEN    Has the patient been exposed to a known COVID-19 (+) person or a person under investigation (PUI) for COVID-19 infection?  -- NO    Has the patient traveled to or are they from a Level III endemic country? --  NO    Has the patient traveled to or are they from a local community endemic area?   --  YES    Does the patient have baseline higher exposure risk such as working in healthcare field  or currently residing in healthcare facility?  --  NO    Helpful websites (please copy and paste in an internet browser):       https://www.cdc.gov/coronavirus/2019-ncov/travelers/map-and-travel-notices.html#travel-1       https://Freever/kkhhxft62       https://www.in.gov/coronavirus/    [SK]      ED Course User Index  [SK] Marine Camargo          Recent Results (from the past 24 hour(s))   POC Protime / INR    Collection Time: 03/22/20 11:19 AM   Result Value Ref Range    Protime 13.3 12.8 - 15.2 seconds    INR 1.1 0.8 - 1.2   POC Surgery Labs    Collection Time: 03/22/20 11:20 AM   Result Value Ref Range    Ionized Calcium 1.25 1.20 - 1.32 mmol/L    POC Potassium 4.2 3.5 - 4.9 mmol/L    Sodium 139 138 - 146 mmol/L    Total CO2 25 24 - 29 mmol/L    Hemoglobin 16.3 12.0 - 17.0 g/dL    Hematocrit 48 38 - 51 %    pCO2, Arterial 44.5 35 - 45 mm Hg    pO2, Arterial 25 (L) 80 - 105 mmHg    Base Excess -2.0000 -5 - 5 mmol/L    O2 Saturation, Arterial 41 (L) 95 - 98 %    pH, Arterial 7.34 (L) 7.35 - 7.6 pH units    HCO3, Arterial 24.2 22 - 26 mmol/L   POC Creatinine    Collection Time: 03/22/20 11:25 AM   Result Value Ref Range    Creatinine 1.70 (H) 0.60 - 1.30 mg/dL   Troponin    Collection Time: 03/22/20 11:37 AM   Result Value Ref Range    Troponin T 0.070 (C) 0.000 - 0.030 ng/mL   aPTT    Collection Time: 03/22/20 11:37 AM   Result Value Ref Range    PTT 34.6 24.0 - 37.0 seconds   AST    Collection Time: 03/22/20 11:37 AM   Result Value Ref Range    AST (SGOT) 27 1 - 40 U/L   ALT    Collection Time: 03/22/20 11:37 AM   Result Value Ref Range    ALT (SGPT) 27 1 - 41 U/L   CBC Auto Differential    Collection Time: 03/22/20 11:37 AM   Result Value Ref Range    WBC 10.17 3.40 - 10.80 10*3/mm3    RBC 5.46 4.14 - 5.80 10*6/mm3    Hemoglobin 16.3 13.0 - 17.7 g/dL    Hematocrit 48.6 37.5 - 51.0 %    MCV 89.0 79.0 - 97.0 fL    MCH 29.9 26.6 - 33.0 pg    MCHC 33.5 31.5 - 35.7 g/dL    RDW 12.9 12.3 - 15.4 %    RDW-SD  42.1 37.0 - 54.0 fl    MPV 10.3 6.0 - 12.0 fL    Platelets 261 140 - 450 10*3/mm3    Neutrophil % 61.9 42.7 - 76.0 %    Lymphocyte % 23.3 19.6 - 45.3 %    Monocyte % 10.3 5.0 - 12.0 %    Eosinophil % 3.4 0.3 - 6.2 %    Basophil % 0.8 0.0 - 1.5 %    Immature Grans % 0.3 0.0 - 0.5 %    Neutrophils, Absolute 6.29 1.70 - 7.00 10*3/mm3    Lymphocytes, Absolute 2.37 0.70 - 3.10 10*3/mm3    Monocytes, Absolute 1.05 (H) 0.10 - 0.90 10*3/mm3    Eosinophils, Absolute 0.35 0.00 - 0.40 10*3/mm3    Basophils, Absolute 0.08 0.00 - 0.20 10*3/mm3    Immature Grans, Absolute 0.03 0.00 - 0.05 10*3/mm3    nRBC 0.0 0.0 - 0.2 /100 WBC     Note: In addition to lab results from this visit, the labs listed above may include labs taken at another facility or during a different encounter within the last 24 hours. Please correlate lab times with ED admission and discharge times for further clarification of the services performed during this visit.    CT Angiogram Neck   Final Result   No large vessel occlusion including only minimal   atherosclerotic involvement of the carotid bifurcations, however within   the MCA distributions bilaterally, there are mild to moderate   irregularities concerning for atherosclerotic involvement without   distinct occlusion left greater than right of the M3 and M4 segments in   particular.       DICTATED:   03/22/2020   EDITED/ls :   03/22/2020            This report was finalized on 3/22/2020 12:27 PM by Dr. Kenneth Durand.          CT Angiogram Head   Final Result   No large vessel occlusion including only minimal   atherosclerotic involvement of the carotid bifurcations, however within   the MCA distributions bilaterally, there are mild to moderate   irregularities concerning for atherosclerotic involvement without   distinct occlusion left greater than right of the M3 and M4 segments in   particular.       DICTATED:   03/22/2020   EDITED/ls :   03/22/2020            This report was finalized on 3/22/2020  12:27 PM by Dr. Kenneth Durand.          CT Cerebral Perfusion With & Without Contrast   Final Result   Reversible ischemia within the left posterior MCA   distribution without evidence for core infarct component.       DICTATED:   03/22/2020   EDITED/ls :   03/22/2020        This report was finalized on 3/22/2020 12:27 PM by Dr. Kenneth Durand.          CT Head Without Contrast Stroke Protocol   Final Result   Abnormal low-attenuation region left frontoparietal   posterior left MCA distribution involvement extending to involve the   cortex with sulcal effacement concerning for acute or evolving   infarction without intracranial hemorrhage.       Scan performed on 03/22/2020 at 1106 hours. Scan report given to ER   physician and team and made available on 03/22/2020 at 1112 hours.       DICTATED:   03/22/2020   EDITED/ls :   03/22/2020            This report was finalized on 3/22/2020 12:27 PM by Dr. Kenneth Durand.          XR Chest 1 View    (Results Pending)   MRI Brain Without Contrast    (Results Pending)     Vitals:    03/22/20 1136 03/22/20 1145 03/22/20 1201 03/22/20 1228   BP: 135/100 (!) 137/101  131/96   BP Location:    Left arm   Patient Position:    Lying   Pulse: 74 73  69   Resp:   16 16   Temp:    97.4 °F (36.3 °C)   TempSrc:    Oral   SpO2: 99% 98%  96%   Weight:       Height:         Medications   sodium chloride 0.9 % flush 10 mL (has no administration in time range)   aspirin chewable tablet 324 mg (has no administration in time range)   sodium chloride 0.9 % flush 10 mL (has no administration in time range)   sodium chloride 0.9 % flush 10 mL (has no administration in time range)   atorvastatin (LIPITOR) tablet 80 mg (has no administration in time range)   aspirin tablet 325 mg (has no administration in time range)     Or   aspirin suppository 300 mg (has no administration in time range)   clopidogrel (PLAVIX) tablet 300 mg (has no administration in time range)   clopidogrel (PLAVIX) tablet 75 mg (has  no administration in time range)   azelastine (ASTELIN) nasal spray 2 spray (has no administration in time range)   cetirizine (zyrTEC) tablet 10 mg (has no administration in time range)   famotidine (PEPCID) tablet 40 mg (has no administration in time range)   gabapentin (NEURONTIN) capsule 300 mg (has no administration in time range)   oxyCODONE-acetaminophen (PERCOCET) 5-325 MG per tablet 1 tablet (has no administration in time range)   sodium chloride 0.9 % flush 10 mL (has no administration in time range)   sodium chloride 0.9 % flush 10 mL (has no administration in time range)   acetaminophen (TYLENOL) tablet 650 mg (has no administration in time range)     Or   acetaminophen (TYLENOL) 160 MG/5ML solution 650 mg (has no administration in time range)     Or   acetaminophen (TYLENOL) suppository 650 mg (has no administration in time range)   melatonin tablet 5 mg (has no administration in time range)   docusate sodium (COLACE) capsule 100 mg (has no administration in time range)   ondansetron (ZOFRAN) tablet 4 mg (has no administration in time range)     Or   ondansetron (ZOFRAN) injection 4 mg (has no administration in time range)   famotidine (PEPCID) tablet 20 mg (has no administration in time range)   heparin (porcine) 5000 UNIT/ML injection 5,000 Units (has no administration in time range)   iopamidol (ISOVUE-370) 76 % injection 150 mL (115 mL Intravenous Given 3/22/20 1131)     ECG/EMG Results (last 24 hours)     Procedure Component Value Units Date/Time    ECG 12 Lead [[id number]] Collected:  03/22/20 1138     Updated:  03/22/20 1139        ECG 12 Lead   Final Result   Test Reason : Acute Stroke Protocol (onset < 12 hrs)   Blood Pressure : **/** mmHG   Vent. Rate : 073 BPM     Atrial Rate : 073 BPM      P-R Int : 236 ms          QRS Dur : 084 ms       QT Int : 358 ms       P-R-T Axes : 075 084 062 degrees      QTc Int : 394 ms      Sinus rhythm with 1st degree AV block   Otherwise normal ECG   No previous  ECGs available   Confirmed by JASWINDER WHITEHEAD MD ([id number]) on 3/23/2020 9:47:40 PM      Referred By:  roge           Confirmed By:JASWINDER WHITEHEAD MD                                            [facility]    Final diagnoses:   Acute CVA (cerebrovascular accident) (CMS/[facility])       Documentation assistance provided by scrcaitlin Camargo.  Information recorded by the scribe was done at my direction and has been verified and validated by me.     Marine Camargo  03/22/20 1230       Jaswinder Whitehead MD  03/28/20 1410

## 2020-03-22 NOTE — CONSULTS
"Neurology Note    Patient:  Lux Mckoy    YOB: 1970    REFERRING PHYSICIAN:  Dr. Joe Rasmussen    CHIEF COMPLAINT:    confusion    HISTORY OF PRESENT ILLNESS:   The patient is a 49 y.o. male smoker with h/o migraine, chronic pain had a sudden onset of confusion, difficulty with speech, transient N/V last am, presented to ED this am 22 persistent symptoms. Wife reports that his gait has been unsteady. At some point he c/o right sided numbness and chest pain. In ED NIHSS 6, /104, NSR, T98, CTH with subacute left posterior parietal ischemic stroke, no ICH, CTP false negative for core infarct, CTA w/o LVO or carotid dissection, personally reviewed. He and wife deny prior stroke symptoms, h/o drug use, heart problems. In ED his wife reported that he has had a cough and SOB for several days.    Past Medical History:  Past Medical History:   Diagnosis Date   • Dupuytren contracture     s/p bilateral hand surgery   • Dyspnea    • Kidney infection    • Pancreatitis        Past Surgical History:  Past Surgical History:   Procedure Laterality Date   • CERVICAL DISCECTOMY ANTERIOR  02/2017    C6-7 at [facility] in [place]   • HAND SURGERY     • LUMBAR DISCECTOMY      \"Spinal\" times 2 in 2003 and 2005   • UMBILICAL HERNIA REPAIR      Dr. Diego Vasquez       Social History:   Social History     Socioeconomic History   • Marital status:      Spouse name: Not on file   • Number of children: Not on file   • Years of education: Not on file   • Highest education level: Not on file   Tobacco Use   • Smoking status: Current Every Day Smoker     Packs/day: 0.50        Family History:   Family History   Problem Relation Age of Onset   • Hypertension Other    • Other Other        Medications Prior to Admission:    Prior to Admission medications    Medication Sig Start Date End Date Taking? Authorizing Provider   azelastine (ASTEPRO) 0.15 % solution nasal spray 1 Squirt into each nostril 2 (two) times a " day. 5/20/15  Yes Eliana Horner MD   cetirizine (ZyrTEC) 10 MG tablet Take 1 tablet by mouth Daily As Needed for allergies. 11/1/16  Yes Ozzie Castro MD   famotidine (PEPCID) 40 MG tablet every night. 5/5/16  Yes Eliana Horner MD   gabapentin (NEURONTIN) 600 MG tablet TAKE 1 TABLET BY MOUTH TWICE DAILY. 5/23/16  Yes Ozzie Castro MD   lisinopril (PRINIVIL,ZESTRIL) 2.5 MG tablet TAKE 1 TABLET BY MOUTH ONCE DAILY. 7/18/16  Yes Ozzie Castro MD   oxyCODONE-acetaminophen (PERCOCET) 7.5-325 MG per tablet Every 4 (Four) Hours As Needed. 2/7/17  Yes Eliana Horner MD   doxazosin (CARDURA) 2 MG tablet TAKE 1 TABLET BY MOUTH EVERY NIGHT. 7/16/18 3/22/20  Ozzie Castro MD   doxycycline (VIBRAMYCIN) 100 MG capsule Take 1 capsule by mouth 2 (Two) Times a Day. 3/7/17 3/22/20  Ozzie Castro MD   DULoxetine (CYMBALTA) 60 MG capsule Take 1 capsule by mouth daily. 4/8/15 3/22/20  Eliana Horner MD   fluticasone (FLONASE) 50 MCG/ACT nasal spray 2 (two) times a day. 7/25/16 3/22/20  Eliana Horner MD   ketoconazole (NIZORAL) 2 % shampoo APPLY AT BEDTIME AND WASH OFF IN THE MORNING 8/15/17 3/22/20  Ozzie Castro MD   meclizine (ANTIVERT) 25 MG tablet Take  by mouth 4 (four) times a day. 3/17/15 3/22/20  Eliana Horner MD   methocarbamol (ROBAXIN) 750 MG tablet 3 (Three) Times a Day As Needed. 10/27/16 3/22/20  Eliana Horner MD   omeprazole (PriLOSEC) 40 MG capsule 2 (two) times a day. 7/7/16 3/22/20  Eliana Horner MD   ondansetron (ZOFRAN) 4 MG tablet Take  by mouth. 2/26/16 3/22/20  Eliana Horner MD   SUMAtriptan (IMITREX) 50 MG tablet TAKE ONE TABLET AT ONSET OF HEADACHE. MAY REPEAT DOSE ONE TIME IN 2 HOURS IF HEADACHE NOT RELIEVED. 12/15/17 3/22/20  Ozzie Castro MD       Allergies:  Augmentin [amoxicillin-pot clavulanate] and Bactrim [sulfamethoxazole-trimethoprim]      Review of system  Review of Systems   Constitutional:  Positive for activity change.   Respiratory: Positive for cough and shortness of breath.    Gastrointestinal: Positive for nausea and vomiting.   Neurological: Positive for speech difficulty.   Psychiatric/Behavioral: Positive for confusion.   All other systems reviewed and are negative.      Vitals:    03/22/20 1228   BP: 131/96   Pulse: 69   Resp: 16   Temp: 97.4 °F (36.3 °C)   SpO2: 96%       Physical exam  Physical Exam   Constitutional: He is oriented to person, place, and time. He appears well-developed and well-nourished.   HENT:   Head: Normocephalic and atraumatic.   Eyes: Pupils are equal, round, and reactive to light. EOM are normal.   Neck: Carotid bruit is not present.   Cardiovascular: Normal rate and regular rhythm.   Pulmonary/Chest: Effort normal.   Neurological: He is alert and oriented to person, place, and time. He has normal reflexes. He displays no Babinski's sign on the right side. He displays no Babinski's sign on the left side.   Speech somewhat disjointed, word finding difficulty, difficulty with naming colors, right/left confusion. Able to repeat, follows commands well. No definite facial droop or limb drift.   Psychiatric: He has a normal mood and affect. His behavior is normal. Thought content normal.         Lab Results   Component Value Date    WBC 10.17 03/22/2020    HGB 16.3 03/22/2020    HCT 48.6 03/22/2020    MCV 89.0 03/22/2020     03/22/2020     Lab Results   Component Value Date    GLUCOSE 97 03/22/2020    BUN 17 03/22/2020    CREATININE 1.58 (H) 03/22/2020    EGFRIFNONA 47 (L) 03/22/2020    BCR 10.8 03/22/2020    CO2 23.0 03/22/2020    CALCIUM 9.7 03/22/2020    ALBUMIN 4.6 04/05/2016    AST 27 03/22/2020    ALT 27 03/22/2020         Radiological Studies:    Ct Angiogram Head    Result Date: 3/22/2020  EXAMINATION: CT ANGIOGRAM NECK, CT ANGIOGRAM HEAD - 03/22/2020  INDICATION: CVA, confusion, abnormal CT head.  TECHNIQUE: CT angiogram head and neck with intravenous  contrast. 2D and 3D reconstructions performed.  The radiation dose reduction device was turned on for each scan per the ALARA (As Low as Reasonably Achievable) protocol.  COMPARISON: CT head noncontrast and CT cerebral perfusion performed concurrently  FINDINGS:  CTA NECK: Normal 3-vessel arch with patent great vessel origins. Proximal subclavian arteries are patent. Vertebral arteries demonstrate grossly symmetric caliber without focal severe stenosis, aneurysm or occlusion through the cervical segments. Carotids demonstrate grossly normal course and branching pattern with only minimal atherosclerotic involvement noncalcified plaque formations at the bifurcations with less than 10% right and 10% left luminal narrowing as measured by NASCET criteria with distal internal carotid arteries patent to the intracranial segments as discussed further below in the dedicated CTA head portion. Cervical soft tissues unremarkable and without bulky cervical adenopathy or abnormality of the lung apices.  CTA HEAD: Distal internal carotid arteries demonstrate minimal atherosclerotic involvement including minimal calcific disease producing mild luminal stenosis without high-grade stenosis, aneurysm or occlusion. Anterior cerebral arteries are patent without hemodynamically significant stenosis, aneurysm or occlusion. Middle cerebral arteries without large vessel occlusion, however mild irregularities in the bilateral M3 and M4 segments, left greater than right without distinct occlusion evident. Vertebrobasilar system and posterior cerebral arteries are patent without hemodynamically significant stenosis, aneurysm or occlusion. Visualized venous structures including superior sagittal sinus patent.      No large vessel occlusion including only minimal atherosclerotic involvement of the carotid bifurcations, however within the MCA distributions bilaterally, there are mild to moderate irregularities concerning for atherosclerotic  involvement without distinct occlusion left greater than right of the M3 and M4 segments in particular.  DICTATED:   03/22/2020 EDITED/ls :   03/22/2020   This report was finalized on 3/22/2020 12:27 PM by Dr. Kenneth Durand.      Ct Angiogram Neck    Result Date: 3/22/2020  EXAMINATION: CT ANGIOGRAM NECK, CT ANGIOGRAM HEAD - 03/22/2020  INDICATION: CVA, confusion, abnormal CT head.  TECHNIQUE: CT angiogram head and neck with intravenous contrast. 2D and 3D reconstructions performed.  The radiation dose reduction device was turned on for each scan per the ALARA (As Low as Reasonably Achievable) protocol.  COMPARISON: CT head noncontrast and CT cerebral perfusion performed concurrently  FINDINGS:  CTA NECK: Normal 3-vessel arch with patent great vessel origins. Proximal subclavian arteries are patent. Vertebral arteries demonstrate grossly symmetric caliber without focal severe stenosis, aneurysm or occlusion through the cervical segments. Carotids demonstrate grossly normal course and branching pattern with only minimal atherosclerotic involvement noncalcified plaque formations at the bifurcations with less than 10% right and 10% left luminal narrowing as measured by NASCET criteria with distal internal carotid arteries patent to the intracranial segments as discussed further below in the dedicated CTA head portion. Cervical soft tissues unremarkable and without bulky cervical adenopathy or abnormality of the lung apices.  CTA HEAD: Distal internal carotid arteries demonstrate minimal atherosclerotic involvement including minimal calcific disease producing mild luminal stenosis without high-grade stenosis, aneurysm or occlusion. Anterior cerebral arteries are patent without hemodynamically significant stenosis, aneurysm or occlusion. Middle cerebral arteries without large vessel occlusion, however mild irregularities in the bilateral M3 and M4 segments, left greater than right without distinct occlusion evident.  Vertebrobasilar system and posterior cerebral arteries are patent without hemodynamically significant stenosis, aneurysm or occlusion. Visualized venous structures including superior sagittal sinus patent.      No large vessel occlusion including only minimal atherosclerotic involvement of the carotid bifurcations, however within the MCA distributions bilaterally, there are mild to moderate irregularities concerning for atherosclerotic involvement without distinct occlusion left greater than right of the M3 and M4 segments in particular.  DICTATED:   03/22/2020 EDITED/ls :   03/22/2020   This report was finalized on 3/22/2020 12:27 PM by Dr. Kenneth Durand.      Xr Chest 1 View    Result Date: 3/22/2020   EXAMINATION: XR CHEST 1 VW-  INDICATION: Acute Stroke Protocol (onset < 12 hrs); I63.9-Cerebral infarction, unspecified  COMPARISON: NONE  FINDINGS: Cardiac size borderline enlarged with trace central vascular congestion without focal consolidation overt edema or effusion. No pneumothorax.      Borderline cardiomegaly with trace central vascular congestion however no overt edema or consolidation of acute parenchymal process. No pleural effusion.       Ct Head Without Contrast Stroke Protocol    Result Date: 3/22/2020  EXAMINATION: CT HEAD WO CONTRAST - 03/22/2020  INDICATION: Evaluate for stroke.  TECHNIQUE: CT head without intravenous contrast.  The radiation dose reduction device was turned on for each scan per the ALARA (As Low as Reasonably Achievable) protocol.  COMPARISON: None.  FINDINGS: Midline structures are without midline shift or hydrocephalus, however asymmetry of abnormal low-attenuation left frontoparietal region of the left posterior MCA distribution concerning for acute or evolving infarction with cortical extension and sulcal effacement in this region. No intra-axial hemorrhage or extra-axial fluid collection. Globes and orbits unremarkable. Visualized paranasal sinuses and mastoid cells are  grossly clear and well pneumatized. Calvarium intact.      Abnormal low-attenuation region left frontoparietal posterior left MCA distribution involvement extending to involve the cortex with sulcal effacement concerning for acute or evolving infarction without intracranial hemorrhage.  Scan performed on 03/22/2020 at 1106 hours. Scan report given to ER physician and team and made available on 03/22/2020 at 1112 hours.  DICTATED:   03/22/2020 EDITED/ls :   03/22/2020   This report was finalized on 3/22/2020 12:27 PM by Dr. Kenneth Durand.      Ct Cerebral Perfusion With & Without Contrast    Result Date: 3/22/2020  EXAMINATION: CT CEREBRAL PERFUSION WWO CONTRAST - 03/22/2020  INDICATION: CVA, confusion, abnormal CT head.  TECHNIQUE: CT cerebral perfusion with and without intravenous contrast. Multiple parametric maps including mean transit time, time to drain, cerebral blood flow and cerebral blood volume performed.  The radiation dose reduction device was turned on for each scan per the ALARA (As Low as Reasonably Achievable) protocol.  COMPARISON: CT stroke head noncontrast performed immediately prior.  FINDINGS: Abnormal perfusion with prolongation of mean transit time and time to drain within the left frontoparietal region distal left MCA distribution with decreased cerebral blood flow, however cerebral blood volume preserved consistent with reversible ischemia and no core infarct element.      Reversible ischemia within the left posterior MCA distribution without evidence for core infarct component.  DICTATED:   03/22/2020 EDITED/ls :   03/22/2020  This report was finalized on 3/22/2020 12:27 PM by Dr. Kenneth Durand.          During this visit the following were done:  Labs Reviewed [x]    Labs Ordered [x]    Radiology Reports Reviewed [x]    Radiology Ordered []    EKG, echo, and/or stress test reviewed [x]    EEG results reviewed  []    EEG reviewed and interpreted per myself   []    Discussed case with  neurointerventionalist or neuroradiologist []    Referring Provider Records Reviewed []    ER Records Reviewed []    Hospital Records Reviewed []    History Obtained From Family []    Radiological images view and Interpreted per myself [x]    Case Discussed with referring provider []     Decision to obtain and request outside records  []        Assessment and Plan     Acute left posterior parietal stroke, MCA territory, suspect cardiac source, e.g. PAF, PFO since CTA is negative. Not a TPA candidate on time.   - Observation on telemetry.   - MRI brain.   - TTE.   - Lipids, A1C,CRP, UDS, TSH, T4, b12, folate, homocysteine, cardiolipin, RPR.   - Cardiology consult in am.   - Consider AC.   - ST, PT, OT.    Thanks,              Electronically signed by Scott Larson MD on 3/22/2020 at 14:06

## 2020-03-22 NOTE — PAYOR COMM NOTE
"Susan Mckoy (49 y.o. Male) INITIAL NOTIFICATION AND CLINICALS    Date of Birth Social Security Number Address Home Phone MRN    1970  [address] [facility] [address] [phone] [id number]    Christian Marital Status          Religion        Admission Date Admission Type Admitting Provider Attending Provider Department, Room/Bed    3/22/20 Emergency Joe Rasmussen MD Furlow, Stephen Matthew, MD [facility] 3E, S331/1    Discharge Date Discharge Disposition Discharge Destination                       Attending Provider:  Joe Rasmussen MD    Allergies:  Augmentin [Amoxicillin-pot Clavulanate], Bactrim [Sulfamethoxazole-trimethoprim]    Isolation:  None   Infection:  None   Code Status:  CPR    Ht:  185.4 cm (73\")   Wt:  90.7 kg (200 lb)    Admission Cmt:  None   Principal Problem:  Acute ischemic stroke (CMS/HCC) [I63.9]                 Active Insurance as of 3/22/2020     Primary Coverage     Payor Plan Insurance Group Employer/Plan Group    [employer] [place] [employer] [place]ICA[place]      Payor Plan Address Payor Plan Phone Number Payor Plan Fax Number Effective Dates    [address] [address] [phone]  6/10/2016 - None Entered    [place] [address]       Subscriber Name Subscriber Birth Date Member ID       SUSAN MCKOY 1970 [id number]                 Emergency Contacts      (Rel.) Home Phone Work Phone Mobile Phone    Meera Mckoy (Spouse) [phone] -- [phone]               History & Physical      Joe Rasmussen MD at [address] [place] [address] Medicine Services  HISTORY AND PHYSICAL    Patient Name: Susan Mckoy  : 1970  MRN: [id number]  Primary Care Physician: Ozzie Castro MD  Date of admission: 3/22/2020      Subjective   Subjective     Chief Complaint:  Follow-up confusion and difficulty speaking    HPI:  Susan Mckoy is a 49 y.o. male with past medical history of " "tobacco use, stage III chronic kidney disease, depression, and other problems listed below who presents the hospital after developing confusion, difficulty speaking, difficulty putting words together, right-sided numbness, and ataxic gait that has been present for approximately 36 hours.  He presented the emergency room with his wife after the symptoms persisted.  She noted he had a transient episode of nausea yesterday without vomiting that is now resolved.  Patient is unable to provide much history due to aphasia and confusion.  In the emergency room he was found to have acute ischemic stroke and is being admitted to telemetry [facility] medicine for further medical management    Review of Systems   Constitutional: Negative for chills, fatigue and fever.   HENT: Negative for sinus pressure and sinus pain.    Eyes: Negative.    Respiratory: Negative for cough and shortness of breath.    Cardiovascular: Negative for chest pain and palpitations.   Gastrointestinal: Positive for nausea. Negative for diarrhea and vomiting.   Endocrine: Negative.    Genitourinary: Negative for dysuria and hematuria.   Musculoskeletal: Negative for neck pain and neck stiffness.   Skin: Negative for rash and wound.   Allergic/Immunologic: Negative.    Neurological: Negative for light-headedness and numbness.   Hematological: Negative for adenopathy. Does not bruise/bleed easily.   Psychiatric/Behavioral: Positive for confusion. The patient is nervous/anxious.    Questions answered yes or no      Personal History     Past Medical History:   Diagnosis Date   • Dupuytren contracture     s/p bilateral hand surgery   • Dyspnea    • Kidney infection    • Pancreatitis        Past Surgical History:   Procedure Laterality Date   • CERVICAL DISCECTOMY ANTERIOR  02/2017    C6-7 at [facility] in [place]   • HAND SURGERY     • LUMBAR DISCECTOMY      \"Spinal\" times 2 in 2003 and 2005   • UMBILICAL HERNIA REPAIR      Dr. Diego Vasquez       Family " History: family history includes Hypertension in an other family member; Other in an other family member.  Reports father has had no myocardial infarction    Social History:  reports that he has been smoking. He has been smoking about 0.50 packs per day. He does not have any smokeless tobacco history on file.  Social History     Social History Narrative   • Not on file       Medications:  Available home medication information reviewed.  Medications Prior to Admission   Medication Sig Dispense Refill Last Dose   • azelastine (ASTEPRO) 0.15 % solution nasal spray 1 Squirt into each nostril 2 (two) times a day.   Taking   • cetirizine (ZyrTEC) 10 MG tablet Take 1 tablet by mouth Daily As Needed for allergies. 30 tablet 2 Taking   • famotidine (PEPCID) 40 MG tablet every night.  0 Taking   • gabapentin (NEURONTIN) 600 MG tablet TAKE 1 TABLET BY MOUTH TWICE DAILY. 60 tablet 1 Taking   • lisinopril (PRINIVIL,ZESTRIL) 2.5 MG tablet TAKE 1 TABLET BY MOUTH ONCE DAILY. 90 tablet 1 Taking   • oxyCODONE-acetaminophen (PERCOCET) 7.5-325 MG per tablet Every 4 (Four) Hours As Needed.  0 Taking       Allergies   Allergen Reactions   • Augmentin [Amoxicillin-Pot Clavulanate] Itching   • Bactrim [Sulfamethoxazole-Trimethoprim] Itching       Objective   Objective     Vital Signs:   Temp:  [97.4 °F (36.3 °C)-98.2 °F (36.8 °C)] 97.4 °F (36.3 °C)  Heart Rate:  [69-76] 69  Resp:  [16-18] 16  BP: (131-139)/() 131/96   Total (NIH Stroke Scale): 4    Physical Exam   Constitutional: Awake, alert  Eyes: PERRLA, sclerae anicteric, no conjunctival injection  HENT: NCAT, mucous membranes moist  Neck: Supple, no thyromegaly, no lymphadenopathy, trachea midline  Respiratory: Clear to auscultation bilaterally, nonlabored respirations   Cardiovascular: RRR, no murmurs, rubs, or gallops, palpable pedal pulses bilaterally  Gastrointestinal: Positive bowel sounds, soft, nontender, nondistended  Musculoskeletal: No bilateral ankle edema, no  clubbing or cyanosis to extremities  Psychiatric: Appropriate affect, cooperative  Neurologic: Word finding difficulty, frequent speaking be incorrect word, able to answer some questions yes or no, assessing orientation is difficult, no facial droop, reports right-sided numbness  Skin: No rashes or jaundice    Results Reviewed:  I have personally reviewed current lab and radiology data.    Results from last 7 days   Lab Units 03/22/20  1137  03/22/20  1119   WBC 10*3/mm3 10.17  --   --    HEMOGLOBIN g/dL 16.3  --   --    HEMOGLOBIN, POC   --    < >  --    HEMATOCRIT % 48.6  --   --    HEMATOCRIT POC   --    < >  --    PLATELETS 10*3/mm3 261  --   --    INR   --   --  1.1    < > = values in this interval not displayed.     Results from last 7 days   Lab Units 03/22/20  1137 03/22/20  1125   CREATININE mg/dL  --  1.70*   ALT (SGPT) U/L 27  --    AST (SGOT) U/L 27  --    TROPONIN T ng/mL 0.070*  --      Estimated Creatinine Clearance: 68.2 mL/min (A) (by C-G formula based on SCr of 1.7 mg/dL (H)).  Brief Urine Lab Results     None        Imaging Results (Last 24 Hours)     Procedure Component Value Units Date/Time    XR Chest 1 View [[id number]] Resulted:  03/22/20 1112     Updated:  03/22/20 1230    CT Angiogram Neck [[id number]] Collected:  03/22/20 1138     Updated:  03/22/20 1230    Narrative:       EXAMINATION: CT ANGIOGRAM NECK, CT ANGIOGRAM HEAD - 03/22/2020     INDICATION: CVA, confusion, abnormal CT head.      TECHNIQUE: CT angiogram head and neck with intravenous contrast. 2D and  3D reconstructions performed.     The radiation dose reduction device was turned on for each scan per the  ALARA (As Low as Reasonably Achievable) protocol.     COMPARISON: CT head noncontrast and CT cerebral perfusion performed  concurrently     FINDINGS:      CTA NECK: Normal 3-vessel arch with patent great vessel origins.  Proximal subclavian arteries are patent. Vertebral arteries demonstrate  grossly symmetric caliber without  focal severe stenosis, aneurysm or  occlusion through the cervical segments. Carotids demonstrate grossly  normal course and branching pattern with only minimal atherosclerotic  involvement noncalcified plaque formations at the bifurcations with less  than 10% right and 10% left luminal narrowing as measured by NASCET  criteria with distal internal carotid arteries patent to the  intracranial segments as discussed further below in the dedicated CTA  head portion. Cervical soft tissues unremarkable and without bulky  cervical adenopathy or abnormality of the lung apices.     CTA HEAD: Distal internal carotid arteries demonstrate minimal  atherosclerotic involvement including minimal calcific disease producing  mild luminal stenosis without high-grade stenosis, aneurysm or  occlusion. Anterior cerebral arteries are patent without hemodynamically  significant stenosis, aneurysm or occlusion. Middle cerebral arteries  without large vessel occlusion, however mild irregularities in the  bilateral M3 and M4 segments, left greater than right without distinct  occlusion evident. Vertebrobasilar system and posterior cerebral  arteries are patent without hemodynamically significant stenosis,  aneurysm or occlusion. Visualized venous structures including superior  sagittal sinus patent.       Impression:       No large vessel occlusion including only minimal  atherosclerotic involvement of the carotid bifurcations, however within  the MCA distributions bilaterally, there are mild to moderate  irregularities concerning for atherosclerotic involvement without  distinct occlusion left greater than right of the M3 and M4 segments in  particular.     DICTATED:   03/22/2020  EDITED/ls :   03/22/2020         This report was finalized on 3/22/2020 12:27 PM by Dr. Kenneth Durand.       CT Cerebral Perfusion With & Without Contrast [[id number]] Collected:  03/22/20 1134     Updated:  03/22/20 1230    Narrative:       EXAMINATION: CT  CEREBRAL PERFUSION WWO CONTRAST - 03/22/2020     INDICATION: CVA, confusion, abnormal CT head.     TECHNIQUE: CT cerebral perfusion with and without intravenous contrast.  Multiple parametric maps including mean transit time, time to drain,  cerebral blood flow and cerebral blood volume performed.     The radiation dose reduction device was turned on for each scan per the  ALARA (As Low as Reasonably Achievable) protocol.     COMPARISON: CT stroke head noncontrast performed immediately prior.     FINDINGS: Abnormal perfusion with prolongation of mean transit time and  time to drain within the left frontoparietal region distal left MCA  distribution with decreased cerebral blood flow, however cerebral blood  volume preserved consistent with reversible ischemia and no core infarct  element.       Impression:       Reversible ischemia within the left posterior MCA  distribution without evidence for core infarct component.     DICTATED:   03/22/2020  EDITED/ls :   03/22/2020      This report was finalized on 3/22/2020 12:27 PM by Dr. Kenneth Durand.       CT Angiogram Head [[id number]] Collected:  03/22/20 1138     Updated:  03/22/20 1230    Narrative:       EXAMINATION: CT ANGIOGRAM NECK, CT ANGIOGRAM HEAD - 03/22/2020     INDICATION: CVA, confusion, abnormal CT head.      TECHNIQUE: CT angiogram head and neck with intravenous contrast. 2D and  3D reconstructions performed.     The radiation dose reduction device was turned on for each scan per the  ALARA (As Low as Reasonably Achievable) protocol.     COMPARISON: CT head noncontrast and CT cerebral perfusion performed  concurrently     FINDINGS:      CTA NECK: Normal 3-vessel arch with patent great vessel origins.  Proximal subclavian arteries are patent. Vertebral arteries demonstrate  grossly symmetric caliber without focal severe stenosis, aneurysm or  occlusion through the cervical segments. Carotids demonstrate grossly  normal course and branching pattern with only  minimal atherosclerotic  involvement noncalcified plaque formations at the bifurcations with less  than 10% right and 10% left luminal narrowing as measured by NASCET  criteria with distal internal carotid arteries patent to the  intracranial segments as discussed further below in the dedicated CTA  head portion. Cervical soft tissues unremarkable and without bulky  cervical adenopathy or abnormality of the lung apices.     CTA HEAD: Distal internal carotid arteries demonstrate minimal  atherosclerotic involvement including minimal calcific disease producing  mild luminal stenosis without high-grade stenosis, aneurysm or  occlusion. Anterior cerebral arteries are patent without hemodynamically  significant stenosis, aneurysm or occlusion. Middle cerebral arteries  without large vessel occlusion, however mild irregularities in the  bilateral M3 and M4 segments, left greater than right without distinct  occlusion evident. Vertebrobasilar system and posterior cerebral  arteries are patent without hemodynamically significant stenosis,  aneurysm or occlusion. Visualized venous structures including superior  sagittal sinus patent.       Impression:       No large vessel occlusion including only minimal  atherosclerotic involvement of the carotid bifurcations, however within  the MCA distributions bilaterally, there are mild to moderate  irregularities concerning for atherosclerotic involvement without  distinct occlusion left greater than right of the M3 and M4 segments in  particular.     DICTATED:   03/22/2020  EDITED/ls :   03/22/2020         This report was finalized on 3/22/2020 12:27 PM by Dr. Kenneth Durand.       CT Head Without Contrast Stroke Protocol [[id number]] Collected:  03/22/20 1111     Updated:  03/22/20 1230    Narrative:       EXAMINATION: CT HEAD WO CONTRAST - 03/22/2020     INDICATION: Evaluate for stroke.     TECHNIQUE: CT head without intravenous contrast.      The radiation dose reduction device was  turned on for each scan per the  ALARA (As Low as Reasonably Achievable) protocol.     COMPARISON: None.     FINDINGS: Midline structures are without midline shift or hydrocephalus,  however asymmetry of abnormal low-attenuation left frontoparietal region  of the left posterior MCA distribution concerning for acute or evolving  infarction with cortical extension and sulcal effacement in this region.  No intra-axial hemorrhage or extra-axial fluid collection. Globes and  orbits unremarkable. Visualized paranasal sinuses and mastoid cells are  grossly clear and well pneumatized. Calvarium intact.       Impression:       Abnormal low-attenuation region left frontoparietal  posterior left MCA distribution involvement extending to involve the  cortex with sulcal effacement concerning for acute or evolving  infarction without intracranial hemorrhage.     Scan performed on 03/22/2020 at 1106 hours. Scan report given to ER  physician and team and made available on 03/22/2020 at 1112 hours.     DICTATED:   03/22/2020  EDITED/ls :   03/22/2020         This report was finalized on 3/22/2020 12:27 PM by Dr. Kenneth Durand.           Results for orders placed during the hospital encounter of 06/27/16   Adult transthoracic echo complete    Narrative · Left ventricular function is normal. Estimated EF = 55%.  · The cardiac valves are anatomically and functionally normal.          Assessment/Plan   Assessment & Plan     Active Hospital Problems    Diagnosis POA   • **Acute ischemic stroke (CMS/HCC) [I63.9] Yes   • Cerebrovascular atherosclerosis seen on imaging [I70.90] Yes   • Tobacco use [Z72.0] Yes   • CKD (chronic kidney disease) stage 3, GFR 30-59 ml/min (CMS/HCC) [N18.3] Yes   • Chronic pain [G89.29] Yes   • Depression [F32.9] Yes   • Dyslipidemia [E78.5] Yes   • GERD (gastroesophageal reflux disease) [K21.9] Yes   • Neuropathy [G62.9] Yes   • Hypertension [I10] Yes     49-year-old male presents the hospital with word finding  and speaking difficulties and right-sided numbness and was found on imaging to have acute ischemic stroke.    Acute ischemic stroke, cerebrovascular atherosclerosis seen on imaging:  Management per stroke protocol.  Aspirin, statin, neurology consult, plan for MRI, assess carotid arteries with imaging, neuro checks, telemetry monitoring.  Speech therapy, PT, OT.    Tobacco use: Cessation counseled.  Reportedly only smoking a few cigarettes a day.      Essential hypertension: Hold lisinopril to allow for permissive hypertension following ischemic stroke.    Stage III chronic kidney disease: BMP currently pending.  Plan to monitor renal function while hospitalized.  Baseline creatinine ranges from 1.5-1.7.    Chronic pain, neuropathy: Decrease gabapentin dose for now.  Monitor mental status.  Decrease Percocet dose.  Careful monitoring and supportive care.    Hyperlipidemia: Check fasting lipid panel tomorrow.  Atorvastatin.    Gastroesophageal reflux disease: H2 blocker.    Depression: Wife reports patient has been off his medication.  Plan to start low-dose Lexapro for now as SSRIs can be beneficial in stroke recovery.    EKG images reviewed sinus rhythm with prolonged MT interval consistent with first-degree AV block.  Wife denies any history of arrhythmia.      DVT prophylaxis: Heparin subcutaneous    CODE STATUS:    Code Status and Medical Interventions:   Ordered at: 03/22/20 1208     Level Of Support Discussed With:    Patient     Code Status:    CPR     Medical Interventions (Level of Support Prior to Arrest):    Full       Admission Status:  I believe this patient meets INPATIENT status due to stroke.  I feel patient’s risk for adverse outcomes and need for care warrant INPATIENT evaluation and I predict the patient’s care encounter to likely last beyond 2 midnights.      Electronically signed by Joe Rasmussen MD, 03/22/20, 12:29 PM.      Electronically signed by Joe Rasmussen MD at  03/22/20 1243       Emergency Department Notes    No notes of this type exist for this encounter.         Vital Signs (last day)     Date/Time   Temp   Temp src   Pulse   Resp   BP   Patient Position   SpO2    03/22/20 1228   97.4 (36.3)   Oral   69   16   131/96   Lying   96    03/22/20 1201   --   --   --   16   --   --   --    03/22/20 1145   --   --   73   --   (!) 137/101   --   98    03/22/20 1136   --   --   74   --   135/100   --   99    03/22/20 1116   98.2 (36.8)   Oral   76   18   (!) 139/104   Lying   99                Facility-Administered Medications as of 3/22/2020   Medication Dose Route Frequency Provider Last Rate Last Dose   • acetaminophen (TYLENOL) tablet 650 mg  650 mg Oral Q4H PRN Joe Rasmussen MD        Or   • acetaminophen (TYLENOL) 160 MG/5ML solution 650 mg  650 mg Oral Q4H PRN Joe Rasmussen MD        Or   • acetaminophen (TYLENOL) suppository 650 mg  650 mg Rectal Q4H PRN Joe Rasmussen MD       • [COMPLETED] aspirin chewable tablet 324 mg  324 mg Oral Once Joe Rasmussen MD   324 mg at 03/22/20 1250   • [START ON 3/23/2020] aspirin tablet 325 mg  325 mg Oral Daily Joe Rasmussen MD        Or   • [START ON 3/23/2020] aspirin suppository 300 mg  300 mg Rectal Daily Joe Rasmussen MD       • atorvastatin (LIPITOR) tablet 80 mg  80 mg Oral Nightly Joe Rasmussen MD       • azelastine (ASTELIN) nasal spray 2 spray  2 spray Each Nare Daily Joe Rasmussen MD       • cetirizine (zyrTEC) tablet 10 mg  10 mg Oral Daily PRN Joe Rasmussen MD       • [COMPLETED] clopidogrel (PLAVIX) tablet 300 mg  300 mg Oral Once Scott Larson MD   300 mg at 03/22/20 1250   • [START ON 3/23/2020] clopidogrel (PLAVIX) tablet 75 mg  75 mg Oral Daily Scott Larson MD       • docusate sodium (COLACE) capsule 100 mg  100 mg Oral BID PRN Joe Rasmussen MD       • escitalopram (LEXAPRO) tablet 10 mg  10  mg Oral Daily Joe Rasmsusen MD   10 mg at 03/22/20 1250   • famotidine (PEPCID) tablet 20 mg  20 mg Oral BID PRN Joe Rasmussen MD       • famotidine (PEPCID) tablet 40 mg  40 mg Oral Nightly Joe Rasmussen MD       • heparin (porcine) 5000 UNIT/ML injection 5,000 Units  5,000 Units Subcutaneous Q8H Joe Rasmussen MD   5,000 Units at 03/22/20 1250   • [COMPLETED] iopamidol (ISOVUE-370) 76 % injection 150 mL  150 mL Intravenous Once in imaging Zi Womack MD   115 mL at 03/22/20 1131   • melatonin tablet 5 mg  5 mg Oral Nightly PRN Joe Rasmussen MD       • ondansetron (ZOFRAN) tablet 4 mg  4 mg Oral Q6H PRN Joe Rasmussen MD        Or   • ondansetron (ZOFRAN) injection 4 mg  4 mg Intravenous Q6H PRN Joe Rasmussen MD       • oxyCODONE-acetaminophen (PERCOCET) 5-325 MG per tablet 1 tablet  1 tablet Oral Q6H PRN Joe Rasmussen MD       • sodium chloride 0.9 % flush 10 mL  10 mL Intravenous PRN Joe Rasmussen MD       • sodium chloride 0.9 % flush 10 mL  10 mL Intravenous Q12H Joe Rasmussen MD   10 mL at 03/22/20 1250   • sodium chloride 0.9 % flush 10 mL  10 mL Intravenous PRN Joe Rasmussen MD       • sodium chloride 0.9 % flush 10 mL  10 mL Intravenous Q12H Joe Rasmussen MD       • sodium chloride 0.9 % flush 10 mL  10 mL Intravenous PRN Joe Rasmussen MD           Lab Results (last 24 hours)     Procedure Component Value Units Date/Time    T4, Free [[id number]]  (Normal) Collected:  03/22/20 1137    Specimen:  Blood Updated:  03/22/20 1524     Free T4 1.49 ng/dL     Narrative:       Results may be falsely increased if patient taking Biotin.      Urine Drug Screen - Urine, Clean Catch [[id number]]  (Abnormal) Collected:  03/22/20 1435    Specimen:  Urine, Clean Catch Updated:  03/22/20 1522     THC, Screen, Urine Negative     Phencyclidine (PCP), Urine Negative     Cocaine Screen,  Urine Negative     Methamphetamine, Ur Negative     Opiate Screen Negative     Amphetamine Screen, Urine Negative     Benzodiazepine Screen, Urine Negative     Tricyclic Antidepressants Screen Negative     Methadone Screen, Urine Negative     Barbiturates Screen, Urine Negative     Oxycodone Screen, Urine Positive     Propoxyphene Screen Negative     Buprenorphine, Screen, Urine Negative    Narrative:       Cutoff For Drugs Screened:    Amphetamines               500 ng/ml  Barbiturates               200 ng/ml  Benzodiazepines            150 ng/ml  Cocaine                    150 ng/ml  Methadone                  200 ng/ml  Opiates                    100 ng/ml  Phencyclidine               25 ng/ml  THC                            50 ng/ml  Methamphetamine            500 ng/ml  Tricyclic Antidepressants  300 ng/ml  Oxycodone                  100 ng/ml  Propoxyphene               300 ng/ml  Buprenorphine               10 ng/ml    The normal value for all drugs tested is negative. This report includes unconfirmed screening results, with the cutoff values listed, to be used for medical treatment purposes only.  Unconfirmed results must not be used for non-medical purposes such as employment or legal testing.  Clinical consideration should be applied to any drug of abuse test, particularly when unconfirmed results are used.      Folate [[id number]] Collected:  03/22/20 1431    Specimen:  Blood Updated:  03/22/20 1453    RPR [[id number]] Collected:  03/22/20 1431    Specimen:  Blood Updated:  03/22/20 1450    Cardiolipin Antibody [[id number]] Collected:  03/22/20 1431    Specimen:  Blood Updated:  03/22/20 1450    Homocysteine [[id number]] Collected:  03/22/20 1431    Specimen:  Blood Updated:  03/22/20 1450    Basic Metabolic Panel [[id number]]  (Abnormal) Collected:  03/22/20 1137    Specimen:  Blood Updated:  03/22/20 1247     Glucose 97 mg/dL      BUN 17 mg/dL      Creatinine 1.58 mg/dL      Sodium 138 mmol/L      Potassium  4.7 mmol/L      Chloride 102 mmol/L      CO2 23.0 mmol/L      Calcium 9.7 mg/dL      eGFR Non African Amer 47 mL/min/1.73      BUN/Creatinine Ratio 10.8     Anion Gap 13.0 mmol/L     Narrative:       GFR Normal >60  Chronic Kidney Disease <60  Kidney Failure <15      [place] Draw [[id number]] Collected:  03/22/20 1137    Specimen:  Blood Updated:  03/22/20 1245    Narrative:       The following orders were created for panel order [place] Draw.  Procedure                               Abnormality         Status                     ---------                               -----------         ------                     Light Blue Top[[id number]]                                   Final result               Green Top (Gel)[[id number]]                                  Final result               Lavender Top[[id number]]                                     Final result               Gold Top - SST[[id number]]                                   Final result                 Please view results for these tests on the individual orders.    Green Top (Gel) [[id number]] Collected:  03/22/20 1137    Specimen:  Blood Updated:  03/22/20 1245     Extra Tube Hold for add-ons.     Comment: Auto resulted.       Lavender Top [[id number]] Collected:  03/22/20 1137    Specimen:  Blood Updated:  03/22/20 1245     Extra Tube hold for add-on     Comment: Auto resulted       Gold Top - SST [[id number]] Collected:  03/22/20 1137    Specimen:  Blood Updated:  03/22/20 1245     Extra Tube Hold for add-ons.     Comment: Auto resulted.       Light Blue Top [[id number]] Collected:  03/22/20 1137    Specimen:  Blood Updated:  03/22/20 1245     Extra Tube hold for add-on     Comment: Auto resulted       Vitamin B12 [[id number]] Collected:  03/22/20 1137    Specimen:  Blood Updated:  03/22/20 1235    Troponin [[id number]]  (Abnormal) Collected:  03/22/20 1137    Specimen:  Blood Updated:  03/22/20 1205     Troponin T 0.070 ng/mL     Narrative:       Troponin T  Reference Range:  <= 0.03 ng/mL-   Negative for AMI  >0.03 ng/mL-     Abnormal for myocardial necrosis.  Clinicians would have to utilize clinical acumen, EKG, Troponin and serial changes to determine if it is an Acute Myocardial Infarction or myocardial injury due to an underlying chronic condition.       Results may be falsely decreased if patient taking Biotin.      aPTT [[id number]]  (Normal) Collected:  03/22/20 1137    Specimen:  Blood Updated:  03/22/20 1200     PTT 34.6 seconds     Narrative:       PTT = The equivalent PTT values for the therapeutic range of heparin levels at 0.3 to 0.5 U/ml are 55 to 70 seconds.    AST [[id number]]  (Normal) Collected:  03/22/20 1137    Specimen:  Blood Updated:  03/22/20 1158     AST (SGOT) 27 U/L     ALT [[id number]]  (Normal) Collected:  03/22/20 1137    Specimen:  Blood Updated:  03/22/20 1158     ALT (SGPT) 27 U/L     POC Creatinine [[id number]]  (Abnormal) Collected:  03/22/20 1125    Specimen:  Blood Updated:  03/22/20 1147     Creatinine 1.70 mg/dL      Comment: Serial Number: [id number]:  [id number]       CBC & Differential [[id number]] Collected:  03/22/20 1137    Specimen:  Blood Updated:  03/22/20 1140    Narrative:       The following orders were created for panel order CBC & Differential.  Procedure                               Abnormality         Status                     ---------                               -----------         ------                     CBC Auto Differential[[id number]]        Abnormal            Final result                 Please view results for these tests on the individual orders.    CBC Auto Differential [[id number]]  (Abnormal) Collected:  03/22/20 1137    Specimen:  Blood Updated:  03/22/20 1140     WBC 10.17 10*3/mm3      RBC 5.46 10*6/mm3      Hemoglobin 16.3 g/dL      Hematocrit 48.6 %      MCV 89.0 fL      MCH 29.9 pg      MCHC 33.5 g/dL      RDW 12.9 %      RDW-SD 42.1 fl      MPV 10.3 fL      Platelets 261 10*3/mm3       Neutrophil % 61.9 %      Lymphocyte % 23.3 %      Monocyte % 10.3 %      Eosinophil % 3.4 %      Basophil % 0.8 %      Immature Grans % 0.3 %      Neutrophils, Absolute 6.29 10*3/mm3      Lymphocytes, Absolute 2.37 10*3/mm3      Monocytes, Absolute 1.05 10*3/mm3      Eosinophils, Absolute 0.35 10*3/mm3      Basophils, Absolute 0.08 10*3/mm3      Immature Grans, Absolute 0.03 10*3/mm3      nRBC 0.0 /100 WBC     POC Protime / INR [[id number]]  (Normal) Collected:  03/22/20 1119    Specimen:  Blood Updated:  03/22/20 1127     Protime 13.3 seconds      INR 1.1     Comment: Serial Number: [id number]:  [id number]       POC Surgery Labs [[id number]]  (Abnormal) Collected:  03/22/20 1120    Specimen:  Blood Updated:  03/22/20 1124     Ionized Calcium 1.25 mmol/L      POC Potassium 4.2 mmol/L      Sodium 139 mmol/L      Total CO2 25 mmol/L      Hemoglobin 16.3 g/dL      Hematocrit 48 %      pCO2, Arterial 44.5 mm Hg      pO2, Arterial 25 mmHg      Comment: Serial Number: [id number]:  [id number]        Base Excess -2.0000 mmol/L      O2 Saturation, Arterial 41 %      pH, Arterial 7.34 pH units      HCO3, Arterial 24.2 mmol/L         Imaging Results (Last 24 Hours)     Procedure Component Value Units Date/Time    MRI Brain Without Contrast [[id number]] Resulted:  03/22/20 1524     Updated:  03/22/20 1530    XR Chest 1 View [[id number]] Collected:  03/22/20 1348     Updated:  03/22/20 1529    Narrative:          EXAMINATION: XR CHEST 1 VW - 03/22/2020     INDICATION: Stroke protocol. I63.9-Cerebral infarction, unspecified.      COMPARISON: None.     FINDINGS: Cardiac size borderline enlarged with trace central vascular  congestion without focal consolidation, overt edema or effusion. No  pneumothorax.       Impression:       Borderline cardiomegaly with trace central vascular  congestion, however no overt edema or consolidation of acute parenchymal  process. No pleural effusion.     DICTATED:   03/22/2020  EDITED/ls :   03/22/2020         CT Angiogram Neck [[id number]] Collected:  03/22/20 1138     Updated:  03/22/20 1230    Narrative:       EXAMINATION: CT ANGIOGRAM NECK, CT ANGIOGRAM HEAD - 03/22/2020     INDICATION: CVA, confusion, abnormal CT head.      TECHNIQUE: CT angiogram head and neck with intravenous contrast. 2D and  3D reconstructions performed.     The radiation dose reduction device was turned on for each scan per the  ALARA (As Low as Reasonably Achievable) protocol.     COMPARISON: CT head noncontrast and CT cerebral perfusion performed  concurrently     FINDINGS:      CTA NECK: Normal 3-vessel arch with patent great vessel origins.  Proximal subclavian arteries are patent. Vertebral arteries demonstrate  grossly symmetric caliber without focal severe stenosis, aneurysm or  occlusion through the cervical segments. Carotids demonstrate grossly  normal course and branching pattern with only minimal atherosclerotic  involvement noncalcified plaque formations at the bifurcations with less  than 10% right and 10% left luminal narrowing as measured by NASCET  criteria with distal internal carotid arteries patent to the  intracranial segments as discussed further below in the dedicated CTA  head portion. Cervical soft tissues unremarkable and without bulky  cervical adenopathy or abnormality of the lung apices.     CTA HEAD: Distal internal carotid arteries demonstrate minimal  atherosclerotic involvement including minimal calcific disease producing  mild luminal stenosis without high-grade stenosis, aneurysm or  occlusion. Anterior cerebral arteries are patent without hemodynamically  significant stenosis, aneurysm or occlusion. Middle cerebral arteries  without large vessel occlusion, however mild irregularities in the  bilateral M3 and M4 segments, left greater than right without distinct  occlusion evident. Vertebrobasilar system and posterior cerebral  arteries are patent without hemodynamically significant stenosis,  aneurysm or  occlusion. Visualized venous structures including superior  sagittal sinus patent.       Impression:       No large vessel occlusion including only minimal  atherosclerotic involvement of the carotid bifurcations, however within  the MCA distributions bilaterally, there are mild to moderate  irregularities concerning for atherosclerotic involvement without  distinct occlusion left greater than right of the M3 and M4 segments in  particular.     DICTATED:   03/22/2020  EDITED/ls :   03/22/2020         This report was finalized on 3/22/2020 12:27 PM by Dr. Kenneth Durand.       CT Cerebral Perfusion With & Without Contrast [[id number]] Collected:  03/22/20 1134     Updated:  03/22/20 1230    Narrative:       EXAMINATION: CT CEREBRAL PERFUSION WWO CONTRAST - 03/22/2020     INDICATION: CVA, confusion, abnormal CT head.     TECHNIQUE: CT cerebral perfusion with and without intravenous contrast.  Multiple parametric maps including mean transit time, time to drain,  cerebral blood flow and cerebral blood volume performed.     The radiation dose reduction device was turned on for each scan per the  ALARA (As Low as Reasonably Achievable) protocol.     COMPARISON: CT stroke head noncontrast performed immediately prior.     FINDINGS: Abnormal perfusion with prolongation of mean transit time and  time to drain within the left frontoparietal region distal left MCA  distribution with decreased cerebral blood flow, however cerebral blood  volume preserved consistent with reversible ischemia and no core infarct  element.       Impression:       Reversible ischemia within the left posterior MCA  distribution without evidence for core infarct component.     DICTATED:   03/22/2020  EDITED/ls :   03/22/2020      This report was finalized on 3/22/2020 12:27 PM by Dr. Kenneth Durand.       CT Angiogram Head [[id number]] Collected:  03/22/20 1138     Updated:  03/22/20 1230    Narrative:       EXAMINATION: CT ANGIOGRAM NECK, CT ANGIOGRAM HEAD  - 03/22/2020     INDICATION: CVA, confusion, abnormal CT head.      TECHNIQUE: CT angiogram head and neck with intravenous contrast. 2D and  3D reconstructions performed.     The radiation dose reduction device was turned on for each scan per the  ALARA (As Low as Reasonably Achievable) protocol.     COMPARISON: CT head noncontrast and CT cerebral perfusion performed  concurrently     FINDINGS:      CTA NECK: Normal 3-vessel arch with patent great vessel origins.  Proximal subclavian arteries are patent. Vertebral arteries demonstrate  grossly symmetric caliber without focal severe stenosis, aneurysm or  occlusion through the cervical segments. Carotids demonstrate grossly  normal course and branching pattern with only minimal atherosclerotic  involvement noncalcified plaque formations at the bifurcations with less  than 10% right and 10% left luminal narrowing as measured by NASCET  criteria with distal internal carotid arteries patent to the  intracranial segments as discussed further below in the dedicated CTA  head portion. Cervical soft tissues unremarkable and without bulky  cervical adenopathy or abnormality of the lung apices.     CTA HEAD: Distal internal carotid arteries demonstrate minimal  atherosclerotic involvement including minimal calcific disease producing  mild luminal stenosis without high-grade stenosis, aneurysm or  occlusion. Anterior cerebral arteries are patent without hemodynamically  significant stenosis, aneurysm or occlusion. Middle cerebral arteries  without large vessel occlusion, however mild irregularities in the  bilateral M3 and M4 segments, left greater than right without distinct  occlusion evident. Vertebrobasilar system and posterior cerebral  arteries are patent without hemodynamically significant stenosis,  aneurysm or occlusion. Visualized venous structures including superior  sagittal sinus patent.       Impression:       No large vessel occlusion including only  minimal  atherosclerotic involvement of the carotid bifurcations, however within  the MCA distributions bilaterally, there are mild to moderate  irregularities concerning for atherosclerotic involvement without  distinct occlusion left greater than right of the M3 and M4 segments in  particular.     DICTATED:   03/22/2020  EDITED/ls :   03/22/2020         This report was finalized on 3/22/2020 12:27 PM by Dr. Kenneth Durand.       CT Head Without Contrast Stroke Protocol [[id number]] Collected:  03/22/20 1111     Updated:  03/22/20 1230    Narrative:       EXAMINATION: CT HEAD WO CONTRAST - 03/22/2020     INDICATION: Evaluate for stroke.     TECHNIQUE: CT head without intravenous contrast.      The radiation dose reduction device was turned on for each scan per the  ALARA (As Low as Reasonably Achievable) protocol.     COMPARISON: None.     FINDINGS: Midline structures are without midline shift or hydrocephalus,  however asymmetry of abnormal low-attenuation left frontoparietal region  of the left posterior MCA distribution concerning for acute or evolving  infarction with cortical extension and sulcal effacement in this region.  No intra-axial hemorrhage or extra-axial fluid collection. Globes and  orbits unremarkable. Visualized paranasal sinuses and mastoid cells are  grossly clear and well pneumatized. Calvarium intact.       Impression:       Abnormal low-attenuation region left frontoparietal  posterior left MCA distribution involvement extending to involve the  cortex with sulcal effacement concerning for acute or evolving  infarction without intracranial hemorrhage.     Scan performed on 03/22/2020 at 1106 hours. Scan report given to ER  physician and team and made available on 03/22/2020 at 1112 hours.     DICTATED:   03/22/2020  EDITED/ls :   03/22/2020         This report was finalized on 3/22/2020 12:27 PM by Dr. Kenneth Durand.           ECG/EMG Results (last 24 hours)     Procedure Component Value Units  Date/Time    ECG 12 Lead [[id number]] Collected:  03/22/20 1138     Updated:  03/22/20 1139    Adult Transthoracic Echo Complete W/ Cont if Necessary Per Protocol (With Agitated Saline) [[id number]] Collected:  03/22/20 1354     Updated:  03/22/20 1451     BSA 2.2 m^2      IVSd 1.0 cm      LVIDd 4.7 cm      LVIDs 3.3 cm      LVPWd 0.89 cm      IVS/LVPW 1.1     FS 30.7 %      EDV(Teich) 103.8 ml      ESV(Teich) 43.5 ml      EF(Teich) 58.1 %      EDV(cubed) 105.7 ml      ESV(cubed) 35.2 ml      EF(cubed) 66.7 %      LV mass(C)d 155.3 grams      LV mass(C)dI 69.4 grams/m^2      SV(Teich) 60.4 ml      SI(Teich) 27.0 ml/m^2      SV(cubed) 70.5 ml      SI(cubed) 31.5 ml/m^2      LA dimension 2.8 cm      LVOT diam 2.5 cm      LVOT area 5.0 cm^2      LVOT area(traced) 4.9 cm^2      LAd major 4.2 cm      MV E max asher 50.8 cm/sec      MV A max asher 59.7 cm/sec      MV E/A 0.85     MV dec time 0.25 sec      Ao pk asher 90.1 cm/sec      Ao max PG 3.2 mmHg      Ao max PG (full) 0.47 mmHg      Ao V2 mean 62.6 cm/sec      Ao mean PG 1.8 mmHg      Ao mean PG (full) 0.22 mmHg      Ao V2 VTI 18.9 cm      MARY(I,A) 4.6 cm^2      MARY(I,D) 4.6 cm^2      MARY(V,A) 4.6 cm^2      MARY(V,D) 4.6 cm^2      LV V1 max PG 2.8 mmHg      LV V1 mean PG 1.6 mmHg      LV V1 max 83.4 cm/sec      LV V1 mean 61.1 cm/sec      LV V1 VTI 17.4 cm      SV(LVOT) 87.4 ml      SI(LVOT) 39.1 ml/m^2      PA V2 max 86.9 cm/sec      PA max PG 3.0 mmHg      PA acc slope 577.2 cm/sec^2      PA acc time 0.12 sec      PA pr(Accel) 25.1 mmHg      Lat E/e'  4.1     Med E/e' 7.6     Lat Peak E' Asher 12.2 cm/sec      Med Peak E' Asher 6.5 cm/sec      BH CV ECHO JOIE - BZI_BMI 23.7 kilograms/m^2      [facility] 2.[facility] m^2       CV ECHO JOIE - BZI_METRIC_WEIGHT 90.7 kg      BH CV ECHO JOIE - BZI_METRIC_HEIGHT 195.6 cm      Avg E/e' ratio 5.43     Target HR (85%) 146 bpm      Max. Pred. HR (100%) 172 bpm       CV VAS BP RIGHT /96 mmHg           Orders (last  24 hrs)      Start     Ordered    03/23/20 0900  aspirin tablet 325 mg  Daily      03/22/20 1208    03/23/20 0900  aspirin suppository 300 mg  Daily      03/22/20 1208    03/23/20 0900  clopidogrel (PLAVIX) tablet 75 mg  Daily      03/22/20 1224    03/23/20 0600  Hemoglobin A1c  Morning Draw,   Status:  Canceled      03/22/20 1208    03/23/20 0600  Lipid Panel  Morning Draw,   Status:  Canceled      03/22/20 1208    03/23/20 0600  Basic Metabolic Panel  Morning Draw      03/22/20 1228    03/23/20 0600  CBC (No Diff)  Morning Draw      03/22/20 1228    03/23/20 0600  Magnesium  Morning Draw      03/22/20 1228    03/23/20 0600  TSH  Morning Draw      03/22/20 1228    03/23/20 0600  Lipid Panel  Morning Draw      03/22/20 1228    03/23/20 0600  Hemoglobin A1c  Morning Draw      03/22/20 1228    03/22/20 2100  atorvastatin (LIPITOR) tablet 80 mg  Nightly      03/22/20 1208    03/22/20 2100  famotidine (PEPCID) tablet 40 mg  Nightly      03/22/20 1228    03/22/20 1800  POC Glucose Q6H  Every 6 Hours     Comments:  May Discontinue After 2 Consecutive Readings Less Than 140Notify Provider if 2 Readings Greater Than 140      03/22/20 1208    03/22/20 1600  Intake and Output  Every 4 Hours      03/22/20 1208    03/22/20 1600  Vital Signs  Every 4 Hours      03/22/20 1228    03/22/20 1444  T4, Free  Once      03/22/20 1425    03/22/20 1426  Folate  Once      03/22/20 1425    03/22/20 1426  RPR  Once      03/22/20 1425    03/22/20 1426  Urine Drug Screen - Urine, Clean Catch  Once      03/22/20 1425    03/22/20 1426  Cardiolipin Antibody  Once      03/22/20 1425    03/22/20 1426  Homocysteine  Once      03/22/20 1425    03/22/20 1400  heparin (porcine) 5000 UNIT/ML injection 5,000 Units  Every 8 Hours Scheduled      03/22/20 1228    03/22/20 1330  escitalopram (LEXAPRO) tablet 10 mg  Daily      03/22/20 1243    03/22/20 1315  clopidogrel (PLAVIX) tablet 300 mg  Once      03/22/20 1224    03/22/20 1315  azelastine (ASTELIN)  nasal spray 2 spray  Daily      03/22/20 1228    03/22/20 1315  gabapentin (NEURONTIN) capsule 300 mg  Every 12 Hours Scheduled,   Status:  Discontinued      03/22/20 1228    03/22/20 1315  sodium chloride 0.9 % flush 10 mL  Every 12 Hours Scheduled      03/22/20 1228    03/22/20 1252  DIET MESSAGE Please bring tray  Once     Comments:  Please bring tray    03/22/20 1252    03/22/20 1229  Vitamin B12  Once      03/22/20 1228    03/22/20 1227  Cardiac Monitoring  Continuous      03/22/20 1228    03/22/20 1227  Diet Regular; Cardiac  Diet Effective Now      03/22/20 1228    03/22/20 1225  Basic Metabolic Panel  Once      03/22/20 1228    03/22/20 1224  Intake & Output  Every Shift      03/22/20 1228    03/22/20 1224  Weigh Patient  Once      03/22/20 1228    03/22/20 1224  Notify Provider (With Default Parameters)  Until Discontinued      03/22/20 1228    03/22/20 1224  Oxygen Therapy- Nasal Cannula; Titrate for SPO2: 90%  Continuous      03/22/20 1228    03/22/20 1224  Insert Peripheral IV  Once      03/22/20 1228    03/22/20 1224  Saline Lock & Maintain IV Access  Continuous      03/22/20 1228    03/22/20 1224  Inpatient Admission  Once      03/22/20 1228    03/22/20 1223  melatonin tablet 5 mg  Nightly PRN      03/22/20 1228    03/22/20 1223  docusate sodium (COLACE) capsule 100 mg  2 Times Daily PRN      03/22/20 1228    03/22/20 1223  ondansetron (ZOFRAN) tablet 4 mg  Every 6 Hours PRN      03/22/20 1228    03/22/20 1223  ondansetron (ZOFRAN) injection 4 mg  Every 6 Hours PRN      03/22/20 1228    03/22/20 1223  famotidine (PEPCID) tablet 20 mg  2 Times Daily PRN      03/22/20 1228    03/22/20 1223  sodium chloride 0.9 % flush 10 mL  As Needed      03/22/20 1228    03/22/20 1223  acetaminophen (TYLENOL) tablet 650 mg  Every 4 Hours PRN      03/22/20 1228    03/22/20 1223  acetaminophen (TYLENOL) 160 MG/5ML solution 650 mg  Every 4 Hours PRN      03/22/20 1228    03/22/20 1223  acetaminophen (TYLENOL) suppository  650 mg  Every 4 Hours PRN      03/22/20 1228    03/22/20 1223  oxyCODONE-acetaminophen (PERCOCET) 5-325 MG per tablet 1 tablet  Every 6 Hours PRN      03/22/20 1228    03/22/20 1222  cetirizine (zyrTEC) tablet 10 mg  Daily PRN      03/22/20 1228    03/22/20 1210  sodium chloride 0.9 % flush 10 mL  Every 12 Hours Scheduled      03/22/20 1208    03/22/20 1209  Inpatient Neurology Consult Stroke  Once     Specialty:  Neurology  Provider:  Scott Larson MD    03/22/20 1209    03/22/20 1208  MRI Brain Without Contrast  1 Time Imaging      03/22/20 1208    03/22/20 1208  Adult Transthoracic Echo Complete W/ Cont if Necessary Per Protocol (With Agitated Saline)  Once      03/22/20 1208    03/22/20 1207  Code Status and Medical Interventions:  Continuous      03/22/20 1208    03/22/20 1207  Assessed for Rehabilitation Services  Once      03/22/20 1208    03/22/20 1207  Reason for Not Administering IV Thrombolytic  Once      03/22/20 1208    03/22/20 1207  Place Sequential Compression Device  Once      03/22/20 1208    03/22/20 1207  Maintain Sequential Compression Device  Continuous      03/22/20 1208    03/22/20 1207  Vital Signs Per Hospital Policy  Per Order Details     Comments:  For ICU Admission: Vital Signs Every 2 Hours  For Telemetry Unit Admission: Vital Signs Every 4 Hours  Keep Systolic Blood Pressure Less Than 220, Diastolic Blood Pressure Less Than 110    03/22/20 1208    03/22/20 1207  Pulse Oximetry, Continuous  Continuous      03/22/20 1208    03/22/20 1207  Cardiac Monitoring  Continuous,   Status:  Canceled      03/22/20 1208    03/22/20 1207  Turn Patient  Now Then Every 2 Hours      03/22/20 1208    03/22/20 1207  Neuro Checks  Per Order Details     Comments:  For ICU Admission: Neuro Checks to Include All Stroke Deficits Every Hour x10 Hours Then Every 2 Hours  For Telemetry Unit Admission: Neuro Checks to Include All Stroke Deficits Every 4 Hours    03/22/20 1208    03/22/20 1207  NIHSS  Assessment  Every Shift     Comments:  Turn off all sedation medications prior to performing assessment. Assessment to be performed upon admission, transfer to another unit, discharge, and with neurological decline. If NIHSS change is greater than or equal to 4 and/or neurological decline is noted notify physician.    03/22/20 1208    03/22/20 1207  Provide Stroke Education Material  Prior to Discharge     Comments:  Educate patient PRN and daily during hospitalization.    03/22/20 1208    03/22/20 1207  Nursing Dysphagia Screening (Complete Prior to Giving Anything By Mouth)  Once      03/22/20 1208    03/22/20 1207  RN to Place Order SLP Consult - Eval & Treat Choosing Reason of RN Dysphagia Screen Failed  Per Order Details     Comments:  RN to Place Order SLP Consult - Eval & Treat Choosing Reason of RN Dysphagia Screen Failed    03/22/20 1208    03/22/20 1207  Nurse to Call MD or Nutrition Services for Diet if Patient Passes Dysphagia Screen  Once      03/22/20 1208    03/22/20 1207  Notify Provider  Until Discontinued      03/22/20 1208    03/22/20 1207  NPO Diet  Diet Effective Now,   Status:  Canceled     Comments:  Strict NPO Until Nursing Dysphagia Screen Passed    03/22/20 1208    03/22/20 1207  OT Consult: Eval & Treat  Once      03/22/20 1208    03/22/20 1207  PT Consult: Eval & Treat As Tolerated  Once      03/22/20 1208    03/22/20 1207  SLP Consult: Eval & Treat Communication Disorder  Once     Comments:  Per stroke protocol.    03/22/20 1208    03/22/20 1207  Inpatient Case Management  Consult  Once     Provider:  (Not yet assigned)    03/22/20 1208    03/22/20 1207  Inpatient Diabetes Educator Consult  Once     Provider:  (Not yet assigned)    03/22/20 1208    03/22/20 1207  Insert Peripheral IV  Once      03/22/20 1208    03/22/20 1207  Saline Lock & Maintain IV Access  Continuous,   Status:  Canceled      03/22/20 1208    03/22/20 1207  Neurological Measures  Continuous       03/22/20 1208    03/22/20 1207  Bilateral Carotid Duplex  Once      03/22/20 1208    03/22/20 1206  sodium chloride 0.9 % flush 10 mL  As Needed      03/22/20 1208    03/22/20 1200  Neuro checks  Every 2 Hours     Comments:  AND as needed.    03/22/20 1105    03/22/20 1157  aspirin chewable tablet 324 mg  Once      03/22/20 1155    03/22/20 1152  Critical Care  Once     Comments:  This order was created via procedure documentation    03/22/20 1151    03/22/20 1151  Tele Bed Request (NATHALIE ONLY)  Once      03/22/20 1150    03/22/20 1148  POC Creatinine  Once      03/22/20 1125    03/22/20 1144  aspirin suppository 300 mg  Once,   Status:  Discontinued      03/22/20 1142    03/22/20 1143  AST  Once      03/22/20 1105    03/22/20 1143  ALT  Once      03/22/20 1105    03/22/20 1131  iopamidol (ISOVUE-370) 76 % injection 150 mL  Once in Imaging      03/22/20 1129    03/22/20 1128  POC Protime / INR  Once      03/22/20 1119    03/22/20 1125  POC Surgery Labs  Once      03/22/20 1120    03/22/20 1112  CT Angiogram Neck  1 Time Imaging      03/22/20 1112    03/22/20 1112  CT Angiogram Head  1 Time Imaging      03/22/20 1112    03/22/20 1112  CT Cerebral Perfusion With & Without Contrast  1 Time Imaging      03/22/20 1112    03/22/20 1106  Initiate Code 19  Once      03/22/20 1105    03/22/20 1106  Inpatient Neurology Consult Stroke  Once     Specialty:  Neurology  Provider:  (Not yet assigned)    03/22/20 1105    03/22/20 1106  Perform NIH Stroke Scale  Once     Comments:  Repeat as needed and per protocol    03/22/20 1105    03/22/20 1106  Measure Weight  Once      03/22/20 1105    03/22/20 1106  Head of bed 30 degrees or less  Until Discontinued      03/22/20 1105    03/22/20 1106  Undress and Gown  Once      03/22/20 1105    03/22/20 1106  Cardiac monitoring  Per Hospital Policy,   Status:  Canceled      03/22/20 1105    03/22/20 1106  Continuous Pulse Oximetry  Continuous,   Status:  Canceled      03/22/20 1105     03/22/20 1106  Vital signs  Per Hospital Policy     Comments:  Every 30 minutes - increase frequency as clinically indicated or for tPA administration.    03/22/20 1105    03/22/20 1106  Notify MD for SBP < 80 or > 180  Until Discontinued      03/22/20 1105    03/22/20 1106  No Hypotonic Fluids  Until Discontinued      03/22/20 1105    03/22/20 1106  NPO Diet  Diet Effective Now,   Status:  Canceled      03/22/20 1105    03/22/20 1106  Nursing swallow assessment  Once      03/22/20 1105    03/22/20 1106  CT Head Without Contrast Stroke Protocol  1 Time Imaging      03/22/20 1105    03/22/20 1106  XR Chest 1 View  1 Time Imaging     Comments:  Do not delay CT to obtain.    03/22/20 1105    03/22/20 1106  ECG 12 Lead  Once     Comments:  Do not delay CT to obtain    03/22/20 1105    03/22/20 1106  Insert large-bore peripheral IV - Right AC preferred  Once      03/22/20 1105    03/22/20 1106  Outlook Draw  Once      03/22/20 1105    03/22/20 1106  CBC & Differential  Once      03/22/20 1105    03/22/20 1106  POCT CHEM 8  Once      03/22/20 1105    03/22/20 1106  Troponin  Once      03/22/20 1105    03/22/20 1106  POCT Protime/INR  Once      03/22/20 1105    03/22/20 1106  aPTT  Once      03/22/20 1105    03/22/20 1106  Light Blue Top  PROCEDURE ONCE      03/22/20 1105    03/22/20 1106  Green Top (Gel)  PROCEDURE ONCE      03/22/20 1105    03/22/20 1106  Lavender Top  PROCEDURE ONCE      03/22/20 1105    03/22/20 1106  Gold Top - SST  PROCEDURE ONCE      03/22/20 1105    03/22/20 1106  CBC Auto Differential  PROCEDURE ONCE      03/22/20 1105    03/22/20 1105  Oxygen Therapy- Nasal Cannula; 2 LPM; Titrate for SPO2: equal to or greater than, 94%  Continuous PRN,   Status:  Canceled      03/22/20 1105    03/22/20 1105  sodium chloride 0.9 % flush 10 mL  As Needed      03/22/20 1105    Unscheduled  Order CT Head Without Contrast for Neurological Decline  As Needed      03/22/20 1208    Unscheduled  Up With Assistance  As  Needed      03/22/20 1228    --  pantoprazole (PROTONIX) 40 MG EC tablet  Daily      03/22/20 1513    --  dicyclomine (BENTYL) 10 MG capsule  3 Times Daily Before Meals      03/22/20 1513                Physician Progress Notes (last 24 hours) (Notes from 03/21/20 1533 through 03/22/20 1533)    No notes of this type exist for this encounter.         Consult Notes (last 24 hours) (Notes from 03/21/20 1533 through 03/22/20 1533)    No notes of this type exist for this encounter.            Nursing Assessments (last 24 hours)      Adult PCS Body System     Row Name 03/22/20 1518 03/22/20 1400 03/22/20 1217             Pain/Comfort/Sleep    Presence of Pain  --  denies pain/discomfort  denies pain/discomfort      Preferred Pain Scale  --  number (Numeric Rating Pain Scale)  number (Numeric Rating Pain Scale)      Pain Rating (0-10): Rest  --  0  0      Pain Rating (0-10): Activity  --  0  0      Sleep/Rest/Relaxation  --  awake  awake         Pain/Comfort/Sleep Interventions    Sleep/Rest Enhancement  --  consistent schedule promoted  consistent schedule promoted         Coping/Psychosocial    Observed Emotional State  --  anxious  anxious      Verbalized Emotional State  --  anxiety  anxiety      Plan of Care Reviewed With  --  patient;spouse  patient;spouse         Psychosocial Support    Trust Relationship/Rapport  --  care explained;questions answered;questions encouraged;thoughts/feelings acknowledged  care explained;questions answered;questions encouraged;thoughts/feelings acknowledged      Diversional Activities  --  television  television      Family/Support System Care  --  involvement promoted  involvement promoted         Involvement in Care    Family/Support System, Persons  --  spouse  spouse      Involvement in Care  --  at bedside;interacting with patient;participating in care;supportive of patient  at bedside;interacting with patient;participating in care;supportive of patient         HEENT    HEENT WDL   --  --  WDL except;face      Face Symptoms  --  --  symmetrical at rest, with movement and expression         Mouth/Teeth WDL    Mouth/Teeth WDL  --  --  WDL except;teeth      Teeth Symptoms  --  --  decay/caries         Cognitive    Cognitive/Neuro/Behavioral WDL  --  --  WDL      Level of Consciousness  --  --  Alert      Arousal Level  --  --  opens eyes spontaneously      Orientation  --  --  disoriented to;place      Speech  --  --  spontaneous      Mood/Behavior  --  --  cooperative;calm;behavior appropriate to situation         Cognitive Interventions    Communication Enhancement Strategies  --  --  call light answered in person         Neuro    Additional Documentation  --  --  [place] Coma Scale (Group);NIH Stroke Scale (Group);Pupils (Group);Sensory Impairment (Row);Visual Assessment (Group)         Pupils    Pupil PERRLA  --  --  yes         [place] Coma Scale    Best Eye Response  --  --  4-->(E4) spontaneous      Best Motor Response  --  --  6-->(M6) obeys commands      Best Verbal Response  --  --  5-->(V5) oriented      [place] Coma Scale Score  --  --  15      Assessment Qualifiers  --  --  patient not sedated/intubated         Visual Assessment    Visual Tracking  --  --  normal      Visual Neglect  --  --  none      Visual Field Cuts  --  --  none         NIH Stroke Scale    1a. Level of Consciousness  --  --  0-->Alert, keenly responsive      1b. LOC Questions  --  --  2-->Answers neither question correctly      1c. LOC Commands  --  --  0-->Performs both tasks correctly      2. Best Gaze  --  --  0-->Normal      3. Visual  --  --  0-->No visual loss      4. Facial Palsy  --  --  0-->Normal symmetrical movements      5a. Motor Arm, Left  --  --  0-->No drift, limb holds 90 (or 45) degrees for full 10 secs      5b. Motor Arm, Right  --  --  0-->No drift, limb holds 90 (or 45) degrees for full 10 secs      6a. Motor Leg, Left  --  --  0-->No drift, leg holds 30 degree position for full 5 secs       6b. Motor Leg, Right  --  --  0-->No drift, leg holds 30 degree position for full 5 secs      7. Limb Ataxia  --  --  0-->Absent      8. Sensory  --  --  1-->Mild-to-moderate sensory loss, patient feels pinprick is less sharp or is dull on the affected side, or there is a loss of superficial pain with pinprick, but patient is aware of being touched      9. Best Language  --  --  1-->Mild-to-moderate aphasia, some obvious loss of fluency or facility of comprehension, without significant limitation on ideas expressed or form of expression. Reduction of speech and/or comprehension, however, makes conversation. . . (see row details)      10. Dysarthria  --  --  0-->Normal      11. Extinction and Inattention (formerly Neglect)  --  --  0-->No abnormality      Total (NIH Stroke Scale)  --  --  4         Respiratory    Respiratory WDL  --  --  WDL except;breath sounds      Rhythm/Pattern, Respiratory  --  no shortness of breath reported;pattern regular;depth regular  no shortness of breath reported;pattern regular;depth regular      Cough And Deep Breathing  --  done with encouragement  done with encouragement         Breath Sounds    Breath Sounds  --  --  All Fields      All Lung Fields Breath Sounds  --  --  Anterior:;Lateral:;diminished         Oxygen Therapy    Device (Oxygen Therapy)  --  room air  room air         Cardiac    Cardiac WDL  --  --  WDL      Cardiac Rhythm  --  --  radial pulse regular      Additional Documentation  --  --  ECG (Group)         ECG    Lead Monitored  --  --  Lead II      Rhythm  --  --  normal sinus rhythm         Peripheral Neurovascular    Peripheral Neurovascular WDL  --  --  WDL         Neurovascular Assessment    Neurovascular Assessment  --  --  General      All Extremities Temperature  --  --  warm         Gastrointestinal    GI WDL  --  --  WDL         Genitourinary    Genitourinary WDL  --  --  WDL         Skin    Skin WDL  --  --  WDL except         Brady Risk Assessment (If  Brady score </= 18, add the appropriate CPG to the care plan)    Sensory Perception  --  --  4-->no impairment      Moisture  --  --  4-->rarely moist      Activity  --  --  3-->walks occasionally      Mobility  --  --  3-->slightly limited      Nutrition  --  --  3-->adequate      Friction and Shear  --  --  3-->no apparent problem      Brady Score  --  --  20         Musculoskeletal    Musculoskeletal WDL  --  --  WDL except;extremity movement;mobility      General Mobility  --  --  generalized weakness      Extremity Movement  --  --  LUE;RUE;LLE;RLE      LUE Extremity Movement  --  --  no overt deficits noted      RUE Extremity Movement  --  --  active ROM mildly impaired      LLE Extremity Movement  --  --  no overt deficits noted      RLE Extremity Movement  --  --  no overt deficits noted         Functional Screen (every 3 days/change)    Ambulation  --  --  1 - assistive equipment      Transferring  --  --  1 - assistive equipment      Toileting  --  --  1 - assistive equipment      Bathing  --  --  1 - assistive equipment      Dressing  --  --  1 - assistive equipment      Eating  --  --  0 - independent      Communication  --  --  0 - understands/communicates without difficulty      Swallowing  --  --  0 - swallows foods/liquids without difficulty         Nutrition    Diet/Nutrition Received  --  --  regular      Nutrition Risk Screen  --  --  no indicators present         Peripheral IV 03/22/20 1115 Left Antecubital    IV Properties Placement Date: 03/22/20 Placement Time: 1115 Hand Hygiene Completed: Yes Size (Gauge): 18 G Orientation: Left Location: Antecubital Site Prep: Chlorhexidine isopropyl alcohol Local Anesthetic: None Technique: Anatomical landmarks Total insertion attempts: 1 Patient Tolerance: Tolerated well    Site Assessment  --  --  Clean;Dry;Intact      Dressing Type  --  --  Transparent      Line Status  --  --  Flushed      Dressing Status  --  --  Clean;Dry;Intact      Reason Not  Rotated  --  --  Not due      Phlebitis  --  --  0-->no symptoms         Adult Sepsis Screening Tool    Previous adult screen positive in last 48 hrs?  no  --  --      Are 2 or > of the above criteria present?  no: STOP/negative screen  --  --         Safety    Safety WDL  --  WDL  WDL      All Alarms  --  alarm(s) activated and audible  alarm(s) activated and audible         [facility] High Risk Falls Assessment (If Fall score is >/=13, add the Fall Risk CPG to the care plan)     Fallen in past 6 months  --  --  0--> No      Mental Status  --  --  0--> no mental status change      Elimination  --  --  0--> No elimination issues      Mobility  --  --  0--> No mobility issues      Medications  --  --  0--> No meds      Nurses' Clinical Judgement  --  --  10      Total Fall Risk Score  --  --  10         Safety Management    Safety Promotion/Fall Prevention  --  activity supervised;fall prevention program maintained;nonskid shoes/slippers when out of bed;safety round/check completed;toileting scheduled  activity supervised;fall prevention program maintained;nonskid shoes/slippers when out of bed;safety round/check completed;toileting scheduled      Medication Review/Management  --  medications reviewed  medications reviewed      Environmental Safety Modification  --  assistive device/personal items within reach;clutter free environment maintained;room organization consistent  assistive device/personal items within reach;clutter free environment maintained;room organization consistent         Daily Care    Activity Management  --  activity adjusted per tolerance  activity adjusted per tolerance      Activity Assistance Provided  --  assistance, 1 person  assistance, 1 person      Elimination Assistance  --  urinal within reach  urinal within reach         Positioning    Body Position  --  position changed independently  position changed independently      Head of Bed (HOB)  --  HOB elevated  HOB elevated       Positioning/Transfer Devices  --  pillows;in use  pillows;in use

## 2020-03-23 ENCOUNTER — APPOINTMENT (OUTPATIENT)
Dept: CARDIOLOGY | Facility: HOSPITAL | Age: 50
End: 2020-03-23

## 2020-03-23 LAB
ANION GAP SERPL CALCULATED.3IONS-SCNC: 12 MMOL/L (ref 5–15)
BUN BLD-MCNC: 18 MG/DL (ref 6–20)
BUN/CREAT SERPL: 11.6 (ref 7–25)
CALCIUM SPEC-SCNC: 9 MG/DL (ref 8.6–10.5)
CHLORIDE SERPL-SCNC: 99 MMOL/L (ref 98–107)
CHOLEST SERPL-MCNC: 157 MG/DL (ref 0–200)
CO2 SERPL-SCNC: 20 MMOL/L (ref 22–29)
CREAT BLD-MCNC: 1.55 MG/DL (ref 0.76–1.27)
DEPRECATED RDW RBC AUTO: 41 FL (ref 37–54)
ERYTHROCYTE [DISTWIDTH] IN BLOOD BY AUTOMATED COUNT: 12.8 % (ref 12.3–15.4)
GFR SERPL CREATININE-BSD FRML MDRD: 48 ML/MIN/1.73
GLUCOSE BLD-MCNC: 94 MG/DL (ref 65–99)
GLUCOSE BLDC GLUCOMTR-MCNC: 93 MG/DL (ref 70–130)
GLUCOSE BLDC GLUCOMTR-MCNC: 98 MG/DL (ref 70–130)
HBA1C MFR BLD: 5.4 % (ref 4.8–5.6)
HCT VFR BLD AUTO: 46.9 % (ref 37.5–51)
HDLC SERPL-MCNC: 28 MG/DL (ref 40–60)
HGB BLD-MCNC: 15.8 G/DL (ref 13–17.7)
LDLC SERPL CALC-MCNC: 83 MG/DL (ref 0–100)
LDLC/HDLC SERPL: 2.98 {RATIO}
MAGNESIUM SERPL-MCNC: 1.9 MG/DL (ref 1.6–2.6)
MCH RBC QN AUTO: 29.6 PG (ref 26.6–33)
MCHC RBC AUTO-ENTMCNC: 33.7 G/DL (ref 31.5–35.7)
MCV RBC AUTO: 88 FL (ref 79–97)
PLATELET # BLD AUTO: 241 10*3/MM3 (ref 140–450)
PMV BLD AUTO: 10.2 FL (ref 6–12)
POTASSIUM BLD-SCNC: 4.1 MMOL/L (ref 3.5–5.2)
RBC # BLD AUTO: 5.33 10*6/MM3 (ref 4.14–5.8)
RPR SER QL: NORMAL
SODIUM BLD-SCNC: 131 MMOL/L (ref 136–145)
TRIGL SERPL-MCNC: 228 MG/DL (ref 0–150)
TROPONIN T SERPL-MCNC: 0.03 NG/ML (ref 0–0.03)
TSH SERPL DL<=0.05 MIU/L-ACNC: 1.42 UIU/ML (ref 0.27–4.2)
VLDLC SERPL-MCNC: 45.6 MG/DL
WBC NRBC COR # BLD: 8.7 10*3/MM3 (ref 3.4–10.8)

## 2020-03-23 PROCEDURE — 84484 ASSAY OF TROPONIN QUANT: CPT | Performed by: INTERNAL MEDICINE

## 2020-03-23 PROCEDURE — B24BZZ4 ULTRASONOGRAPHY OF HEART WITH AORTA, TRANSESOPHAGEAL: ICD-10-PCS | Performed by: PSYCHIATRY & NEUROLOGY

## 2020-03-23 PROCEDURE — 82962 GLUCOSE BLOOD TEST: CPT

## 2020-03-23 PROCEDURE — 84443 ASSAY THYROID STIM HORMONE: CPT | Performed by: INTERNAL MEDICINE

## 2020-03-23 PROCEDURE — 83036 HEMOGLOBIN GLYCOSYLATED A1C: CPT | Performed by: INTERNAL MEDICINE

## 2020-03-23 PROCEDURE — 25010000002 FENTANYL CITRATE (PF) 100 MCG/2ML SOLUTION: Performed by: INTERNAL MEDICINE

## 2020-03-23 PROCEDURE — 93312 ECHO TRANSESOPHAGEAL: CPT

## 2020-03-23 PROCEDURE — 93325 DOPPLER ECHO COLOR FLOW MAPG: CPT

## 2020-03-23 PROCEDURE — 99254 IP/OBS CNSLTJ NEW/EST MOD 60: CPT | Performed by: INTERNAL MEDICINE

## 2020-03-23 PROCEDURE — 97161 PT EVAL LOW COMPLEX 20 MIN: CPT

## 2020-03-23 PROCEDURE — 97166 OT EVAL MOD COMPLEX 45 MIN: CPT

## 2020-03-23 PROCEDURE — 80048 BASIC METABOLIC PNL TOTAL CA: CPT | Performed by: INTERNAL MEDICINE

## 2020-03-23 PROCEDURE — 99152 MOD SED SAME PHYS/QHP 5/>YRS: CPT

## 2020-03-23 PROCEDURE — 85027 COMPLETE CBC AUTOMATED: CPT | Performed by: INTERNAL MEDICINE

## 2020-03-23 PROCEDURE — 99233 SBSQ HOSP IP/OBS HIGH 50: CPT | Performed by: PSYCHIATRY & NEUROLOGY

## 2020-03-23 PROCEDURE — 99233 SBSQ HOSP IP/OBS HIGH 50: CPT | Performed by: INTERNAL MEDICINE

## 2020-03-23 PROCEDURE — 83735 ASSAY OF MAGNESIUM: CPT | Performed by: INTERNAL MEDICINE

## 2020-03-23 PROCEDURE — 25010000002 HEPARIN (PORCINE) PER 1000 UNITS: Performed by: INTERNAL MEDICINE

## 2020-03-23 PROCEDURE — 25010000002 MIDAZOLAM PER 1 MG: Performed by: INTERNAL MEDICINE

## 2020-03-23 PROCEDURE — 93321 DOPPLER ECHO F-UP/LMTD STD: CPT

## 2020-03-23 PROCEDURE — 80061 LIPID PANEL: CPT | Performed by: INTERNAL MEDICINE

## 2020-03-23 RX ORDER — FENTANYL CITRATE 50 UG/ML
INJECTION, SOLUTION INTRAMUSCULAR; INTRAVENOUS
Status: COMPLETED | OUTPATIENT
Start: 2020-03-23 | End: 2020-03-23

## 2020-03-23 RX ORDER — MIDAZOLAM HYDROCHLORIDE 1 MG/ML
INJECTION INTRAMUSCULAR; INTRAVENOUS
Status: COMPLETED | OUTPATIENT
Start: 2020-03-23 | End: 2020-03-23

## 2020-03-23 RX ORDER — FOLIC ACID 1 MG/1
1 TABLET ORAL DAILY
Status: DISCONTINUED | OUTPATIENT
Start: 2020-03-23 | End: 2020-03-25 | Stop reason: HOSPADM

## 2020-03-23 RX ADMIN — FENTANYL CITRATE 50 MCG: 0.05 INJECTION, SOLUTION INTRAMUSCULAR; INTRAVENOUS at 14:15

## 2020-03-23 RX ADMIN — MIDAZOLAM 2 MG: 1 INJECTION INTRAMUSCULAR; INTRAVENOUS at 14:17

## 2020-03-23 RX ADMIN — FAMOTIDINE 20 MG: 20 TABLET ORAL at 21:51

## 2020-03-23 RX ADMIN — METHOHEXITAL SODIUM 30 MG: 500 INJECTION, POWDER, LYOPHILIZED, FOR SOLUTION INTRAMUSCULAR; INTRAVENOUS; RECTAL at 14:12

## 2020-03-23 RX ADMIN — DICYCLOMINE HYDROCHLORIDE 10 MG: 20 TABLET ORAL at 08:36

## 2020-03-23 RX ADMIN — SODIUM CHLORIDE, PRESERVATIVE FREE 10 ML: 5 INJECTION INTRAVENOUS at 08:36

## 2020-03-23 RX ADMIN — HEPARIN SODIUM 5000 UNITS: 5000 INJECTION, SOLUTION INTRAVENOUS; SUBCUTANEOUS at 06:04

## 2020-03-23 RX ADMIN — PANTOPRAZOLE SODIUM 40 MG: 40 TABLET, DELAYED RELEASE ORAL at 06:04

## 2020-03-23 RX ADMIN — ESCITALOPRAM OXALATE 10 MG: 10 TABLET ORAL at 08:36

## 2020-03-23 RX ADMIN — MIDAZOLAM 2 MG: 1 INJECTION INTRAMUSCULAR; INTRAVENOUS at 14:12

## 2020-03-23 RX ADMIN — HEPARIN SODIUM 5000 UNITS: 5000 INJECTION, SOLUTION INTRAVENOUS; SUBCUTANEOUS at 13:33

## 2020-03-23 RX ADMIN — METHOHEXITAL SODIUM 20 MG: 500 INJECTION, POWDER, LYOPHILIZED, FOR SOLUTION INTRAMUSCULAR; INTRAVENOUS; RECTAL at 14:25

## 2020-03-23 RX ADMIN — METHOHEXITAL SODIUM 20 MG: 500 INJECTION, POWDER, LYOPHILIZED, FOR SOLUTION INTRAMUSCULAR; INTRAVENOUS; RECTAL at 14:14

## 2020-03-23 RX ADMIN — ATORVASTATIN CALCIUM 80 MG: 40 TABLET, FILM COATED ORAL at 21:51

## 2020-03-23 RX ADMIN — FOLIC ACID 1 MG: 1 TABLET ORAL at 08:36

## 2020-03-23 RX ADMIN — Medication 5 MG: at 21:51

## 2020-03-23 RX ADMIN — DICYCLOMINE HYDROCHLORIDE 10 MG: 20 TABLET ORAL at 12:00

## 2020-03-23 RX ADMIN — ASPIRIN 325 MG ORAL TABLET 325 MG: 325 PILL ORAL at 08:36

## 2020-03-23 RX ADMIN — OXYCODONE HYDROCHLORIDE AND ACETAMINOPHEN 1 TABLET: 5; 325 TABLET ORAL at 18:03

## 2020-03-23 RX ADMIN — HEPARIN SODIUM 5000 UNITS: 5000 INJECTION, SOLUTION INTRAVENOUS; SUBCUTANEOUS at 21:51

## 2020-03-23 RX ADMIN — SODIUM CHLORIDE, PRESERVATIVE FREE 10 ML: 5 INJECTION INTRAVENOUS at 21:52

## 2020-03-23 RX ADMIN — CLOPIDOGREL BISULFATE 75 MG: 75 TABLET ORAL at 08:36

## 2020-03-23 RX ADMIN — DICYCLOMINE HYDROCHLORIDE 10 MG: 20 TABLET ORAL at 18:00

## 2020-03-23 RX ADMIN — ACETAMINOPHEN 650 MG: 325 TABLET, FILM COATED ORAL at 08:36

## 2020-03-23 RX ADMIN — OXYCODONE HYDROCHLORIDE AND ACETAMINOPHEN 1 TABLET: 5; 325 TABLET ORAL at 09:18

## 2020-03-23 RX ADMIN — AZELASTINE HYDROCHLORIDE 2 SPRAY: 137 SPRAY, METERED NASAL at 08:36

## 2020-03-23 NOTE — PROGRESS NOTES
Cardiology update      CATY attempted today but patient could not be adequately sedated to continue with the procedure.  Have rescheduled for tomorrow with anesthesia.  Patient may eat today and will make n.p.o. after midnight.  CATY will follow his stress PET myocardial perfusion study.  Discussed with patient's wife

## 2020-03-23 NOTE — PROGRESS NOTES
[facility] Medicine Services  PROGRESS NOTE    Patient Name: Lux Mckoy  : 1970  MRN: [id number]    Date of Admission: 3/22/2020  Primary Care Physician: Ozzie Castro MD    Subjective   Subjective     CC:  Follow-up ischemic stroke and ataxic gait    HPI:  States his speech is better today.  Pleased with his improvement.  His wife is at the bedside and also pleased with his improvement.  Numbness improved.  Hoping for a full recovery.  Notes mild headache this morning.  Says he thinks it will get better with Tylenol.  No other new complaints.    Review of Systems  No current fevers or chills  No current shortness of breath or cough  No current nausea, vomiting, or diarrhea  No current chest pain or palpitations    Objective   Objective     Vital Signs:   Temp:  [97.4 °F (36.3 °C)-98.2 °F (36.8 °C)] 98.1 °F (36.7 °C)  Heart Rate:  [67-76] 74  Resp:  [16-18] 18  BP: (118-139)/() 121/89  Total (NIH Stroke Scale): 2     Physical Exam:  Constitutional:Awake, alert  HENT: NCAT, mucous membranes moist, neck supple  Respiratory: Clear to auscultation bilaterally, respiratory effort normal, nonlabored breathing   Cardiovascular: RRR, normal radial pulses  Gastrointestinal: Positive bowel sounds, soft, nontender, nondistended  Musculoskeletal: Normal musculature for age, no lower extremity edema, BMI 26  Psychiatric: Appropriate affect, cooperative, conversational  Neurologic: Speech mostly clear with much less aphasia, follows commands well, oriented x3    Skin: No rashes or jaundice, warm      Results Reviewed:  Results from last 7 days   Lab Units 20  0451 20  1137 20  1120 20  1119   WBC 10*3/mm3 8.70 10.17  --   --    HEMOGLOBIN g/dL 15.8 16.3  --   --    HEMOGLOBIN, POC g/dL  --   --  16.3  --    HEMATOCRIT % 46.9 48.6  --   --    HEMATOCRIT POC %  --   --  48  --    PLATELETS 10*3/mm3 241 261  --   --    INR   --   --   --  1.1     Results  from last 7 days   Lab Units 03/23/20  0451 03/22/20  1137 03/22/20  1125   SODIUM mmol/L 131* 138  --    POTASSIUM mmol/L 4.1 4.7  --    CHLORIDE mmol/L 99 102  --    CO2 mmol/L 20.0* 23.0  --    BUN mg/dL 18 17  --    CREATININE mg/dL 1.55* 1.58* 1.70*   GLUCOSE mg/dL 94 97  --    CALCIUM mg/dL 9.0 9.7  --    ALT (SGPT) U/L  --  27  --    AST (SGOT) U/L  --  27  --    TROPONIN T ng/mL  --  0.070*  --      Estimated Creatinine Clearance: 74.8 mL/min (A) (by C-G formula based on SCr of 1.55 mg/dL (H)).    Microbiology Results Abnormal     None          Imaging Results (Last 24 Hours)     Procedure Component Value Units Date/Time    MRI Brain Without Contrast [[id number]] Collected:  03/22/20 1532     Updated:  03/22/20 1859    Narrative:       EXAMINATION: MRI BRAIN WO CONTRAST - 03/22/2020     INDICATION: Confusion, difficult words and speech, unsteady gait, off  balance. Evaluate for stroke.     I63.9-Cerebral infarction, unspecified      TECHNIQUE: Multiplanar MRI of the brain without intravenous contrast.     COMPARISON: None.     FINDINGS: Abnormal restriction throughout the bilateral cerebral  hemispheres. Multifocal appearance greatest region of fairly confluent  restriction in the left temporoparietal region as seen on perfusion and  prior imaging same day consistent with acute infarctions. There is  abnormal signal in the left temporoparietal region of edema and sulcal  effacement as seen on prior imaging is well without midline shift or  hydrocephalus. Mild background chronic small vessel ischemic disease  findings. Cervicomedullary junction widely patent. Pituitary and sella  within normal limits. Globes and orbits retain normal T2 signal  characteristics. Visualized paranasal sinuses and mastoid air cells are  grossly clear and well-pneumatized. No cerebellopontine angle mass  lesion.       Impression:       Abnormal restriction throughout the bilateral cerebral  hemispheres. Multifocal appearance  greatest region of fairly confluent  restriction in the left temporoparietal region as seen on perfusion and  prior imaging same day consistent with acute multifocal infarctions of  concern for embolic etiology given bilateral and multifocal  distribution. No midline shift or hydrocephalus.     DICTATED:   03/22/2020  EDITED/ls :   03/22/2020          This report was finalized on 3/22/2020 6:56 PM by Dr. Kenneth Durand.       XR Chest 1 View [[id number]] Collected:  03/22/20 1348     Updated:  03/22/20 1858    Narrative:          EXAMINATION: XR CHEST 1 VW - 03/22/2020     INDICATION: Stroke protocol. I63.9-Cerebral infarction, unspecified.      COMPARISON: None.     FINDINGS: Cardiac size borderline enlarged with trace central vascular  congestion without focal consolidation, overt edema or effusion. No  pneumothorax.       Impression:       Borderline cardiomegaly with trace central vascular  congestion, however no overt edema or consolidation of acute parenchymal  process. No pleural effusion.     DICTATED:   03/22/2020  EDITED/ls :   03/22/2020      This report was finalized on 3/22/2020 6:55 PM by Dr. Kenneth Durand.       CT Angiogram Neck [[id number]] Collected:  03/22/20 1138     Updated:  03/22/20 1230    Narrative:       EXAMINATION: CT ANGIOGRAM NECK, CT ANGIOGRAM HEAD - 03/22/2020     INDICATION: CVA, confusion, abnormal CT head.      TECHNIQUE: CT angiogram head and neck with intravenous contrast. 2D and  3D reconstructions performed.     The radiation dose reduction device was turned on for each scan per the  ALARA (As Low as Reasonably Achievable) protocol.     COMPARISON: CT head noncontrast and CT cerebral perfusion performed  concurrently     FINDINGS:      CTA NECK: Normal 3-vessel arch with patent great vessel origins.  Proximal subclavian arteries are patent. Vertebral arteries demonstrate  grossly symmetric caliber without focal severe stenosis, aneurysm or  occlusion through the cervical segments.  Carotids demonstrate grossly  normal course and branching pattern with only minimal atherosclerotic  involvement noncalcified plaque formations at the bifurcations with less  than 10% right and 10% left luminal narrowing as measured by NASCET  criteria with distal internal carotid arteries patent to the  intracranial segments as discussed further below in the dedicated CTA  head portion. Cervical soft tissues unremarkable and without bulky  cervical adenopathy or abnormality of the lung apices.     CTA HEAD: Distal internal carotid arteries demonstrate minimal  atherosclerotic involvement including minimal calcific disease producing  mild luminal stenosis without high-grade stenosis, aneurysm or  occlusion. Anterior cerebral arteries are patent without hemodynamically  significant stenosis, aneurysm or occlusion. Middle cerebral arteries  without large vessel occlusion, however mild irregularities in the  bilateral M3 and M4 segments, left greater than right without distinct  occlusion evident. Vertebrobasilar system and posterior cerebral  arteries are patent without hemodynamically significant stenosis,  aneurysm or occlusion. Visualized venous structures including superior  sagittal sinus patent.       Impression:       No large vessel occlusion including only minimal  atherosclerotic involvement of the carotid bifurcations, however within  the MCA distributions bilaterally, there are mild to moderate  irregularities concerning for atherosclerotic involvement without  distinct occlusion left greater than right of the M3 and M4 segments in  particular.     DICTATED:   03/22/2020  EDITED/ls :   03/22/2020         This report was finalized on 3/22/2020 12:27 PM by Dr. Kenneth Durand.       CT Cerebral Perfusion With & Without Contrast [[id number]] Collected:  03/22/20 1134     Updated:  03/22/20 1230    Narrative:       EXAMINATION: CT CEREBRAL PERFUSION WWO CONTRAST - 03/22/2020     INDICATION: CVA, confusion,  abnormal CT head.     TECHNIQUE: CT cerebral perfusion with and without intravenous contrast.  Multiple parametric maps including mean transit time, time to drain,  cerebral blood flow and cerebral blood volume performed.     The radiation dose reduction device was turned on for each scan per the  ALARA (As Low as Reasonably Achievable) protocol.     COMPARISON: CT stroke head noncontrast performed immediately prior.     FINDINGS: Abnormal perfusion with prolongation of mean transit time and  time to drain within the left frontoparietal region distal left MCA  distribution with decreased cerebral blood flow, however cerebral blood  volume preserved consistent with reversible ischemia and no core infarct  element.       Impression:       Reversible ischemia within the left posterior MCA  distribution without evidence for core infarct component.     DICTATED:   03/22/2020  EDITED/ls :   03/22/2020      This report was finalized on 3/22/2020 12:27 PM by Dr. Kenneth Durand.       CT Angiogram Head [[id number]] Collected:  03/22/20 1138     Updated:  03/22/20 1230    Narrative:       EXAMINATION: CT ANGIOGRAM NECK, CT ANGIOGRAM HEAD - 03/22/2020     INDICATION: CVA, confusion, abnormal CT head.      TECHNIQUE: CT angiogram head and neck with intravenous contrast. 2D and  3D reconstructions performed.     The radiation dose reduction device was turned on for each scan per the  ALARA (As Low as Reasonably Achievable) protocol.     COMPARISON: CT head noncontrast and CT cerebral perfusion performed  concurrently     FINDINGS:      CTA NECK: Normal 3-vessel arch with patent great vessel origins.  Proximal subclavian arteries are patent. Vertebral arteries demonstrate  grossly symmetric caliber without focal severe stenosis, aneurysm or  occlusion through the cervical segments. Carotids demonstrate grossly  normal course and branching pattern with only minimal atherosclerotic  involvement noncalcified plaque formations at the  bifurcations with less  than 10% right and 10% left luminal narrowing as measured by NASCET  criteria with distal internal carotid arteries patent to the  intracranial segments as discussed further below in the dedicated CTA  head portion. Cervical soft tissues unremarkable and without bulky  cervical adenopathy or abnormality of the lung apices.     CTA HEAD: Distal internal carotid arteries demonstrate minimal  atherosclerotic involvement including minimal calcific disease producing  mild luminal stenosis without high-grade stenosis, aneurysm or  occlusion. Anterior cerebral arteries are patent without hemodynamically  significant stenosis, aneurysm or occlusion. Middle cerebral arteries  without large vessel occlusion, however mild irregularities in the  bilateral M3 and M4 segments, left greater than right without distinct  occlusion evident. Vertebrobasilar system and posterior cerebral  arteries are patent without hemodynamically significant stenosis,  aneurysm or occlusion. Visualized venous structures including superior  sagittal sinus patent.       Impression:       No large vessel occlusion including only minimal  atherosclerotic involvement of the carotid bifurcations, however within  the MCA distributions bilaterally, there are mild to moderate  irregularities concerning for atherosclerotic involvement without  distinct occlusion left greater than right of the M3 and M4 segments in  particular.     DICTATED:   03/22/2020  EDITED/ls :   03/22/2020         This report was finalized on 3/22/2020 12:27 PM by Dr. Kenneth Durand.       CT Head Without Contrast Stroke Protocol [[id number]] Collected:  03/22/20 1111     Updated:  03/22/20 1230    Narrative:       EXAMINATION: CT HEAD WO CONTRAST - 03/22/2020     INDICATION: Evaluate for stroke.     TECHNIQUE: CT head without intravenous contrast.      The radiation dose reduction device was turned on for each scan per the  ALARA (As Low as Reasonably Achievable)  protocol.     COMPARISON: None.     FINDINGS: Midline structures are without midline shift or hydrocephalus,  however asymmetry of abnormal low-attenuation left frontoparietal region  of the left posterior MCA distribution concerning for acute or evolving  infarction with cortical extension and sulcal effacement in this region.  No intra-axial hemorrhage or extra-axial fluid collection. Globes and  orbits unremarkable. Visualized paranasal sinuses and mastoid cells are  grossly clear and well pneumatized. Calvarium intact.       Impression:       Abnormal low-attenuation region left frontoparietal  posterior left MCA distribution involvement extending to involve the  cortex with sulcal effacement concerning for acute or evolving  infarction without intracranial hemorrhage.     Scan performed on 03/22/2020 at 1106 hours. Scan report given to ER  physician and team and made available on 03/22/2020 at 1112 hours.     DICTATED:   03/22/2020  EDITED/ls :   03/22/2020         This report was finalized on 3/22/2020 12:27 PM by Dr. Kenneth Durand.             Results for orders placed during the hospital encounter of 03/22/20   Adult Transthoracic Echo Complete W/ Cont if Necessary Per Protocol (With Agitated Saline)    Narrative · Estimated EF = 60%.  · Left ventricular systolic function is normal.          I have reviewed the medications:  Scheduled Meds:  aspirin 325 mg Oral Daily   Or      aspirin 300 mg Rectal Daily   atorvastatin 80 mg Oral Nightly   azelastine 2 spray Each Nare Daily   clopidogrel 75 mg Oral Daily   dicyclomine 10 mg Oral Daily With Breakfast, Lunch & Dinner   escitalopram 10 mg Oral Daily   famotidine 20 mg Oral Nightly   heparin (porcine) 5,000 Units Subcutaneous Q8H   pantoprazole 40 mg Oral Q AM   sodium chloride 10 mL Intravenous Q12H   sodium chloride 10 mL Intravenous Q12H     Continuous Infusions:   PRN Meds:.•  acetaminophen **OR** acetaminophen **OR** acetaminophen  •  cetirizine  •  docusate  sodium  •  famotidine  •  melatonin  •  ondansetron **OR** ondansetron  •  oxyCODONE-acetaminophen  •  sodium chloride  •  sodium chloride  •  sodium chloride    Assessment/Plan   Assessment & Plan     Active Hospital Problems    Diagnosis  POA   • **Acute ischemic stroke (CMS/[facility]) [I63.9]  Yes   • Cerebrovascular atherosclerosis seen on imaging [I70.90]  Yes   • Tobacco use [Z72.0]  Yes   • CKD (chronic kidney disease) stage 3, GFR 30-59 ml/min (CMS/HCC) [N18.3]  Yes   • Chronic pain [G89.29]  Yes   • Depression [F32.9]  Yes   • Dyslipidemia [E78.5]  Yes   • GERD (gastroesophageal reflux disease) [K21.9]  Yes   • Neuropathy [G62.9]  Yes   • Hypertension [I10]  Yes      Resolved Hospital Problems   No resolved problems to display.        Brief Hospital Course to date:  Lux Mckoy is a 49 y.o. male with presents the hospital with word finding and speaking difficulties and right-sided numbness and was found on imaging to have acute ischemic stroke.    Discussion/plan: MRI reviewed.  Bilateral hemispheric stroke left greater than right.  Concerning for embolic phenomenon.  Plan to follow-up on echocardiogram results today.  Plan to discuss further with neurology.  No significant carotid issues.  Follow-up on PT OT and speech evaluations.  Further tobacco cessation counseled.  Continue aspirin and statin.  Patient needs improvement in dietary intake.  Continue to hold lisinopril.  Continue decreased dose of gabapentin.  Repeat laboratory studies tomorrow.     Acute ischemic stroke, cerebrovascular atherosclerosis seen on imaging:  Management per stroke protocol.  Aspirin, statin, neurology team following, MRI positive for bilateral cerebellar strokes left greater than right, no significant carotid artery stenosis with imaging, continue neuro checks, telemetry monitoring.  Speech therapy, PT, OT.     Tobacco use: Cessation counseled.  Reportedly only smoking a few cigarettes a day.    Agrees to quit.     Essential  hypertension: Hold lisinopril to allow for permissive hypertension following ischemic stroke.     Stage III chronic kidney disease: BMP currently pending.  Plan to monitor renal function while hospitalized.  Baseline creatinine ranges from 1.5-1.7.  Remains at baseline.     Chronic pain, neuropathy: Decrease gabapentin dose for now.  Monitor mental status.  Decrease Percocet dose.  Careful monitoring and supportive care.     Hyperlipidemia with hypertriglyceridemia: Triglycerides 228, LDL 83 with preferred goal less than 70.  Atorvastatin.      Gastroesophageal reflux disease: H2 blocker.  Stable without current symptoms     Depression: Wife reports patient has been off his medication.    Now started low-dose Lexapro for now as SSRIs can be beneficial in stroke recovery.  Patient tolerating it well, symptoms stable.     EKG images reviewed sinus rhythm with prolonged OK interval consistent with first-degree AV block.  Wife denies any history of arrhythmia.  Telemetry remains sinus rhythm.    DVT Prophylaxis: Subcutaneous heparin    Disposition: I expect the patient to be discharged location to be determined pending further work-up and treatment    CODE STATUS:   Code Status and Medical Interventions:   Ordered at: 03/22/20 1208     Level Of Support Discussed With:    Patient     Code Status:    CPR     Medical Interventions (Level of Support Prior to Arrest):    Full         Electronically signed by Joe Rasmussen MD, 03/23/20, 07:49.

## 2020-03-23 NOTE — SIGNIFICANT NOTE
03/23/20 1508   SLP Deferred Reason   SLP Deferred Reason Patient unavailable for treatment  (Pt gone for CATY. SLP to follow up on 3/24/20 as pt is available.)

## 2020-03-23 NOTE — CONSULTS
Order noted for diabetes education per stroke protocol. Current A1c 5.4%. No history of diabetes noted per chart review. Please re consult if needed. Thank you

## 2020-03-23 NOTE — THERAPY EVALUATION
"Acute Care - Occupational Therapy Initial Evaluation  [facility]     Patient Name: Lux Mckoy  : 1970  MRN: [id number]  Today's Date: 3/23/2020  Onset of Illness/Injury or Date of Surgery: 20  Date of Referral to OT: 20       Admit Date: 3/22/2020       ICD-10-CM ICD-9-CM   1. Acute CVA (cerebrovascular accident) (CMS/[facility]) I63.9 434.91   2. Decreased activities of daily living (ADL) Z78.9 V49.89     Patient Active Problem List   Diagnosis   • Allergic rhinitis   • [place] esophagus   • Chronic pain   • Chronic prostatitis   • Depression   • Dyslipidemia   • GERD (gastroesophageal reflux disease)   • Hypertension   • Hypogonadism in male   • Neuropathy with IgA monoclonal gammopathy (CMS/[facility])   • Migraine   • Neuropathy   • Pulmonary nodule   • Vertigo   • Tinea versicolor   • Acute frontal sinusitis   • Acute ischemic stroke (CMS/[facility])   • Cerebrovascular atherosclerosis seen on imaging   • Tobacco use   • CKD (chronic kidney disease) stage 3, GFR 30-59 ml/min (CMS/[facility])     Past Medical History:   Diagnosis Date   • Dupuytren contracture     s/p bilateral hand surgery   • Dyspnea    • Kidney infection    • Pancreatitis      Past Surgical History:   Procedure Laterality Date   • CERVICAL DISCECTOMY ANTERIOR  2017    C6-7 at [place] in [place]   • HAND SURGERY     • LUMBAR DISCECTOMY      \"Spinal\" times 2 in  and    • UMBILICAL HERNIA REPAIR      Dr. Diego Vasquez          OT ASSESSMENT FLOWSHEET (last 12 hours)      Occupational Therapy Evaluation     Row Name 20 0852                   OT Evaluation Time/Intention    Subjective Information  complains of;dizziness;pain  -SG        Document Type  evaluation  -SG        Mode of Treatment  occupational therapy  -SG        Patient Effort  good  -SG        Symptoms Noted During/After Treatment  dizziness  -SG        Comment  Pt agreeable to participate in OT evaluation, c/o HA and dizziness throughout  -SG           General " Information    Patient Profile Reviewed?  yes  -SG        Onset of Illness/Injury or Date of Surgery  03/22/20  -SG        Patient Observations  alert;cooperative;agree to therapy  -SG        Patient/Family Observations  Wife in room for OT evaluation  -SG        General Observations of Patient  Pt in bed upon OT arrival  -SG        Prior Level of Function  independent:;all household mobility;community mobility;ADL's  -SG        Equipment Currently Used at Home  none  -SG        Limitations/Impairments  safety/cognitive;sensory  -SG        Risks Reviewed  patient:;spouse/S.O.:;dizziness  -SG        Benefits Reviewed  patient:;spouse/S.O.:;improve function;increase independence;increase strength;increase balance  -SG           Relationship/Environment    Primary Source of Support/Comfort  spouse  -SG        Lives With  spouse  -SG        Family Caregiver if Needed  spouse  -SG           Resource/Environmental Concerns    Current Living Arrangements  home/apartment/condo  -SG        Resource/Environmental Concerns  none  -SG           Home Main Entrance    Number of Stairs, Main Entrance  one  -SG        Stair Railings, Main Entrance  none  -SG           Cognitive Assessment/Interventions    Additional Documentation  Cognitive Assessment/Intervention (Group)  -SG           Cognitive Assessment/Intervention- PT/OT    Affect/Mental Status (Cognitive)  confused  -SG        Orientation Status (Cognition)  disoriented to;person;situation;verbal cues/prompts needed for orientation;place;time  -SG        Follows Commands (Cognition)  follows one step commands;25-49% accuracy  -SG        Cognitive Function (Cognitive)  safety deficit  -SG        Safety Deficit (Cognitive)  ability to follow commands;awareness of need for assistance;insight into deficits/self awareness;safety precautions awareness  -SG        Personal Safety Interventions  gait belt;nonskid shoes/slippers when out of bed  -SG           Safety Issues,  Functional Mobility    Safety Issues Affecting Function (Mobility)  ability to follow commands;awareness of need for assistance;insight into deficits/self awareness;safety precaution awareness  -SG        Impairments Affecting Function (Mobility)  cognition;coordination;endurance/activity tolerance;pain;sensation/sensory awareness;strength  -SG           Bed Mobility Assessment/Treatment    Bed Mobility Assessment/Treatment  rolling right;supine-sit  -SG        Rolling Right [place] (Bed Mobility)  supervision;verbal cues  -SG        Supine-Sit [place] (Bed Mobility)  contact guard;verbal cues  -SG        Assistive Device (Bed Mobility)  bed rails;head of bed elevated  -SG        Comment (Bed Mobility)  Pt in supine upon OT arrival. Followed instructions to roll to the right w/ spv and vc's, then to sitting position on EOB w/ CGA and vc's d/t cognitive impairements/ability to follow commands  -SG           Functional Mobility    Functional Mobility- Ind. Level  contact guard assist;verbal cues required  -SG        Functional Mobility-Distance (Feet)  4  -SG        Functional Mobility- Comment  Pt took approx 4 steps from bed to chair when transferring, requiring CGA and vc's for safety  -SG           Transfer Assessment/Treatment    Transfer Assessment/Treatment  bed-chair transfer;sit-stand transfer;stand-sit transfer  -SG           Bed-Chair Transfer    Bed-Chair [place] (Transfers)  contact guard;verbal cues  -SG           Sit-Stand Transfer    Sit-Stand [place] (Transfers)  contact guard;verbal cues  -SG           Stand-Sit Transfer    Stand-Sit [place] (Transfers)  contact guard;verbal cues  -SG           ADL Assessment/Intervention    BADL Assessment/Intervention  lower body dressing  -SG        Additional Documentation  Comment, IADL Assessment/Training (Row)  -SG           Lower Body Dressing Assessment/Training    Lower Body Dressing [place] Level  doff;don;socks;minimum assist  (75% patient effort);verbal cues  -SG        Lower Body Dressing Position  edge of bed sitting  -SG           BADL Safety/Performance    Impairments, BADL Safety/Performance  balance;cognition;coordination;pain;sensory awareness;strength  -SG        Cognitive Impairments, BADL Safety/Performance  insight into deficits/self awareness;safety precaution awareness;sequencing abilities  -SG           General ROM    GENERAL ROM COMMENTS  BUE Grossly WFL  -SG           MMT (Manual Muscle Testing)    General MMT Comments  LUE grossly 4/5; RUE grossly 3/5 (decreased strength/mobility in R elbow d/t recent surgery)  -SG           Motor Assessment/Interventions    Additional Documentation  Balance (Group)  -SG           Balance    Balance  static sitting balance;static standing balance;dynamic sitting balance;dynamic standing balance  -SG           Static Sitting Balance    Level of [place] (Unsupported Sitting, Static Balance)  contact guard assist  -SG        Sitting Position (Unsupported Sitting, Static Balance)  sitting on edge of bed  -SG        Time Able to Maintain Position (Unsupported Sitting, Static Balance)  4 to 5 minutes  -SG           Dynamic Sitting Balance    Level of [place], Reaches Outside Midline (Sitting, Dynamic Balance)  contact guard assist  -SG        Sitting Position, Reaches Outside Midline (Sitting, Dynamic Balance)  sitting on edge of bed  -SG           Static Standing Balance    Level of [place] (Supported Standing, Static Balance)  contact guard assist  -SG        Time Able to Maintain Position (Supported Standing, Static Balance)  1 to 2 minutes  -SG        Comment (Supported Standing, Static Balance)  w/ CGA stood up from sitting EOB, did not demo any LOB, but did continue to c/o of headache and dizzinesss  -SG           Dynamic Standing Balance    Level of [place], Reaches Outside Midline (Standing, Dynamic Balance)  contact guard assist  -SG        Time Able to Maintain  Position, Reaches Outside Midline (Standing, Dynamic Balance)  15 to 30 seconds  -SG        Comment, Reaches Outside Midline (Standing, Dynamic Balance)  tx from bed to chair w/ CGA w/o LOB but continued to c/o of headache and dizziness  -SG           Sensory Assessment/Intervention    Sensory General Assessment  -- Pt reports that he has numbness/tingling on R side  -SG           Positioning and Restraints    Pre-Treatment Position  in bed  -SG        Post Treatment Position  chair  -SG        In Chair  notified nsg;reclined;sitting;call light within reach;encouraged to call for assist;exit alarm on;with family/caregiver  -SG           Pain Assessment    Additional Documentation  Pain Scale: Numbers Pre/Post-Treatment (Group)  -SG           Pain Scale: Numbers Pre/Post-Treatment    Pain Scale: Numbers, Pretreatment  5/10  -SG        Pain Scale: Numbers, Post-Treatment  5/10  -SG        Pain Location - Side  other (see comments) headache  -SG        Pain Location  head  -SG        Pain Intervention(s)  -- Nsg provided pain meds at the end of session  -SG           Plan of Care Review    Plan of Care Reviewed With  patient;spouse  -SG           Clinical Impression (OT)    Date of Referral to OT  03/23/20  -SG        OT Diagnosis  impaired ADL/IADLs  -SG        Therapy Frequency (OT Eval)  daily  -SG        Care Plan Review (OT)  evaluation/treatment results reviewed;care plan/treatment goals reviewed;risks/benefits reviewed;current/potential barriers reviewed;patient/other agree to care plan  -SG        Care Plan Review, Other Participant (OT Eval)  caregiver;spouse  -SG        Anticipated Equipment Needs at Discharge (OT)  tub bench  -SG        Anticipated Discharge Disposition (OT)  home with assist;home with home health  -SG           Vital Signs    Pre Systolic BP Rehab  121  -SG        Pre Treatment Diastolic BP  89  -SG        Post Systolic BP Rehab  121  -SG        Post Treatment Diastolic BP  89  -SG         Pretreatment Heart Rate (beats/min)  74  -SG        Posttreatment Heart Rate (beats/min)  75  -SG        Pre SpO2 (%)  95  -SG        Post SpO2 (%)  95  -SG        Pre Patient Position  Supine  -SG        Intra Patient Position  Sitting  -SG        Post Patient Position  Sitting  -SG           Planned OT Interventions    Planned Therapy Interventions (OT Eval)  activity tolerance training;adaptive equipment training;BADL retraining;functional balance retraining;occupation/activity based interventions;patient/caregiver education/training;ROM/therapeutic exercise;strengthening exercise  -SG           OT Goals    Transfer Goal Selection (OT)  transfer, OT goal 1  -SG        Dressing Goal Selection (OT)  dressing, OT goal 1  -SG        Toileting Goal Selection (OT)  toileting, OT goal 1  -SG           Transfer Goal 1 (OT)    Activity/Assistive Device (Transfer Goal 1, OT)  commode  -SG        [place] Level/Cues Needed (Transfer Goal 1, OT)  contact guard assist  -SG        Time Frame (Transfer Goal 1, OT)  long term goal (LTG);10 days  -SG           Dressing Goal 1 (OT)    Activity/Assistive Device (Dressing Goal 1, OT)  lower body dressing  -SG        [place]/Cues Needed (Dressing Goal 1, OT)  contact guard assist  -SG        Time Frame (Dressing Goal 1, OT)  long term goal (LTG);10 days  -SG           Toileting Goal 1 (OT)    Activity/Device (Toileting Goal 1, OT)  toileting skills, all  -SG        [place] Level/Cues Needed (Toileting Goal 1, OT)  contact guard assist  -SG        Time Frame (Toileting Goal 1, OT)  long term goal (LTG);10 days  -SG           Living Environment    Home Accessibility  stairs to enter home  -SG          User Key  (r) = Recorded By, (t) = Taken By, (c) = Cosigned By    Initials Name Effective Dates    Sabine Neves OTR/L 01/20/20 -                OT Recommendation and Plan  Outcome Summary/Treatment Plan (OT)  Anticipated Equipment Needs at Discharge (OT): tub  "bench  Anticipated Discharge Disposition (OT): home with assist, home with home health  Planned Therapy Interventions (OT Eval): activity tolerance training, adaptive equipment training, BADL retraining, functional balance retraining, occupation/activity based interventions, patient/caregiver education/training, ROM/therapeutic exercise, strengthening exercise  Therapy Frequency (OT Eval): daily  Plan of Care Review  Plan of Care Reviewed With: patient, spouse  Plan of Care Reviewed With: patient, spouse  Outcome Summary: Upon OT arrival, pt in bed w/ wife present in room. Pt c/o dizziness, headache, and disorientation, reporting he \"just doesn't feel right\".  Pt agreeable to therapy, sitting up at EOB w/ CGA w/o LOB. Demo'd LBD w/ min A sitting EOB. Stood w/ CGA and tx from bed to chair w/ CGA and vc'ing for sequencing and following commands. He was able to follow general commands about 25-50% of time. He will benefit from continuing with IP OT, w/ HHA at home upon d/c..    Outcome Measures     Row Name 03/23/20 0852             How much help from another is currently needed...    Putting on and taking off regular lower body clothing?  3  -SG      Bathing (including washing, rinsing, and drying)  3  -SG      Toileting (which includes using toilet bed pan or urinal)  3  -SG      Putting on and taking off regular upper body clothing  3  -SG      Taking care of personal grooming (such as brushing teeth)  3  -SG      Eating meals  4  -SG      AM-PAC 6 Clicks Score (OT)  19  -SG         Functional Assessment    Outcome Measure Options  AM-PAC 6 Clicks Daily Activity (OT)  -SG        User Key  (r) = Recorded By, (t) = Taken By, (c) = Cosigned By    Initials Name Provider Type    Sabine Neves, OTR/L Occupational Therapist          Time Calculation:   Time Calculation- OT     Row Name 03/23/20 0852             Time Calculation- OT    OT Start Time  0852  -      OT Received On  03/23/20  -      OT Goal Re-Cert Due " Date  04/02/20  -OK        User Key  (r) = Recorded By, (t) = Taken By, (c) = Cosigned By    Initials Name Provider Type    Sabine Neves OTR/L Occupational Therapist        Therapy Charges for Today     Code Description Service Date Service Provider Modifiers Qty    [id number] HC OT EVAL MOD COMPLEXITY 4 3/23/2020 Sabine Olivo OTR/L GO 1               SUNDAY Hoffmann/HARDEEP  3/23/2020

## 2020-03-23 NOTE — PLAN OF CARE
Problem: Patient Care Overview  Goal: Plan of Care Review  Outcome: Ongoing (interventions implemented as appropriate)  Flowsheets (Taken 3/23/2020 1015)  Progress: no change  Plan of Care Reviewed With: patient; spouse; other (see comments) (Ria OCONNOR)  Note:   WOC nurse consulted for left elbow wound. Pt and wife state he had elbow surgery at  on 3/12; they were instructed by surgeon to leave dressing in place until f/u with him on 3/31. Dressing held in place with ACE wrap; dry and intact. WOC nurse will f/u. Please contact WOC nurse as needed for concerns.

## 2020-03-23 NOTE — PROGRESS NOTES
"Neurology       Patient Care Team:  Ozzie Castro MD as PCP - General  Ozzie Castro MD as PCP - Family Medicine    Chief complaint: confusion and difficulty speaking      Subjective .     History:  Sitting up in bed. Wife at bedside. NAD noted. No adverse events overnight.     ROS:   Denies pain, denies headache    Objective     Vital Signs   Blood pressure 121/89, pulse 72, temperature 98.1 °F (36.7 °C), temperature source Oral, resp. rate 18, height 185.4 cm (73\"), weight 90.7 kg (200 lb), SpO2 95 %.    Physical Exam:   General: Middle aged male in NAD   Resp:even and unlabored   Neuro: Alert and oriented x4, mild to moderate aphasia, mild right sided sensory loss, minor right facial droop,  MAEW     Results Review:  LDL- 83  HgbA1C- 5.40  Cr. 1.55  Folate 6.59  UDS +oxycodone    Ct Angiogram Head    Result Date: 3/22/2020  No large vessel occlusion including only minimal atherosclerotic involvement of the carotid bifurcations, however within the MCA distributions bilaterally, there are mild to moderate irregularities concerning for atherosclerotic involvement without distinct occlusion left greater than right of the M3 and M4 segments in particular.  DICTATED:   03/22/2020 EDITED/ls :   03/22/2020   This report was finalized on 3/22/2020 12:27 PM by Dr. Kenneth Durand.      Ct Angiogram Neck    Result Date: 3/22/2020  No large vessel occlusion including only minimal atherosclerotic involvement of the carotid bifurcations, however within the MCA distributions bilaterally, there are mild to moderate irregularities concerning for atherosclerotic involvement without distinct occlusion left greater than right of the M3 and M4 segments in particular.  DICTATED:   03/22/2020 EDITED/ls :   03/22/2020   This report was finalized on 3/22/2020 12:27 PM by Dr. Kenneth Durand.      Mri Brain Without Contrast    Result Date: 3/22/2020  Abnormal restriction throughout the bilateral cerebral hemispheres. Multifocal " appearance greatest region of fairly confluent restriction in the left temporoparietal region as seen on perfusion and prior imaging same day consistent with acute multifocal infarctions of concern for embolic etiology given bilateral and multifocal distribution. No midline shift or hydrocephalus.  DICTATED:   03/22/2020 EDITED/ls :   03/22/2020    This report was finalized on 3/22/2020 6:56 PM by Dr. Kenneth Durand.      Xr Chest 1 View    Result Date: 3/22/2020  Borderline cardiomegaly with trace central vascular congestion, however no overt edema or consolidation of acute parenchymal process. No pleural effusion.  DICTATED:   03/22/2020 EDITED/ls :   03/22/2020  This report was finalized on 3/22/2020 6:55 PM by Dr. Kenneth Durand.      Ct Head Without Contrast Stroke Protocol    Result Date: 3/22/2020  Abnormal low-attenuation region left frontoparietal posterior left MCA distribution involvement extending to involve the cortex with sulcal effacement concerning for acute or evolving infarction without intracranial hemorrhage.  Scan performed on 03/22/2020 at 1106 hours. Scan report given to ER physician and team and made available on 03/22/2020 at 1112 hours.  DICTATED:   03/22/2020 EDITED/ls :   03/22/2020   This report was finalized on 3/22/2020 12:27 PM by Dr. Kenneth Durand.      Ct Cerebral Perfusion With & Without Contrast    Result Date: 3/22/2020  Reversible ischemia within the left posterior MCA distribution without evidence for core infarct component.  DICTATED:   03/22/2020 EDITED/ls :   03/22/2020  This report was finalized on 3/22/2020 12:27 PM by Dr. Kenneth Durand.      Assessment/Plan     Acute left posterior parietal stroke, MCA territory  - ? cardioembolic source; ? PAF, PFO     - MRI brain -multifocal infarctions concerning for embolic etiology   - Cardiology : CATY was tried today but could not be completed so it will be repeated tomorrow.  - Anticoagulation recs per Cardiology  - Continue ASA, Plavix,  and Statin  - ST,PT, OT      I discussed the patients findings and my recommendations with patient and family    ALDO Peter, AGACNP-BC  03/23/20  10:47

## 2020-03-23 NOTE — PROGRESS NOTES
Discharge Planning Assessment  [facility]     Patient Name: Lux Mckoy  MRN: [id number]  Today's Date: 3/23/2020    Admit Date: 3/22/2020    Discharge Needs Assessment     Row Name 03/23/20 1613       Living Environment    Lives With  spouse    Name(s) of Who Lives With Patient  spouse is Meera Mckoy    Current Living Arrangements  home/apartment/condo    Primary Care Provided by  self    Provides Primary Care For  no one    Family Caregiver if Needed  spouse    Quality of Family Relationships  supportive    Able to Return to Prior Arrangements  yes       Resource/Environmental Concerns    Resource/Environmental Concerns  none    Transportation Concerns  car, none       Transition Planning    Patient/Family Anticipates Transition to  inpatient rehabilitation facility    Patient/Family Anticipated Services at Transition  rehabilitation services    Transportation Anticipated  family or friend will provide       Discharge Needs Assessment    Readmission Within the Last 30 Days  no previous admission in last 30 days    Concerns to be Addressed  discharge planning;adjustment to diagnosis/illness;coping/stress    Equipment Currently Used at Home  none    Anticipated Changes Related to Illness  other (see comments) to be determined after rehab    Equipment Needed After Discharge  walker, rolling    Discharge Facility/Level of Care Needs  rehabilitation facility    Provided Post Acute Provider List?  Yes    Post Acute Provider List  Inpatient Rehab    Delivered To  Patient    Method of Delivery  In person    Patient's Choice of Community Agency(s)  tells me okay to make a [facility] referral        Discharge Plan     Row Name 03/23/20 1615       Plan    Plan  In facility rehab    Plan Comments  I spoke with patient and his wife. He is a pleasant 49 year old  man who resides in [facility]. He is interested in in-facility rehab. His insurance benefits are for acute rehab only. He is agreeable for [facility] referral. He  tells me he will think about if he even wants this but is agreeable for a referral for me to give.         Final Discharge Disposition Code  62 - inpatient rehab facility        Destination      Coordination has not been started for this encounter.      Durable Medical Equipment      Coordination has not been started for this encounter.      Dialysis/Infusion      Coordination has not been started for this encounter.      Home Medical Care      Coordination has not been started for this encounter.      Therapy      Coordination has not been started for this encounter.      Community Resources      Coordination has not been started for this encounter.        Expected Discharge Date and Time     Expected Discharge Date Expected Discharge Time    Mar 24, 2020         Demographic Summary     Row Name 03/23/20 1612       General Information    Admission Type  inpatient    Preferred Language  English    General Information Comments  PCP is Evan Loyd       Contact Information    Permission Granted to Share Info With      Contact Information Comments  [phone]        Functional Status     Row Name 03/23/20 1612       Functional Status    Usual Activity Tolerance  excellent    Current Activity Tolerance  -- did no assess       Functional Status, IADL    Medications  independent    Meal Preparation  independent    Housekeeping  independent    Laundry  independent    Shopping  independent    IADL Comments  this is prior to the cva       Employment/    Employment/ Comments Patient has [employer] insurance and denies concerns regarding coverage or disruption in coverage issues. Patient has drug coverage and denies issues obtaining or affording current medications.  .        Psychosocial    No documentation.       Abuse/Neglect    No documentation.       Legal    No documentation.       Substance Abuse    No documentation.       Patient Forms    No documentation.           Delaney MEIER  Ronn RN

## 2020-03-23 NOTE — PLAN OF CARE
"  Problem: Patient Care Overview  Goal: Plan of Care Review  Flowsheets (Taken 3/23/2020 0852)  Outcome Summary: Upon OT arrival, pt in bed w/ wife present in room. Pt c/o dizziness, headache, and disorientation, reporting he \"just doesn't feel right\".  Pt agreeable to therapy, sitting up at EOB w/ CGA w/o LOB. Demo'd LBD w/ min A sitting EOB. Stood w/ CGA and tx from bed to chair w/ CGA and vc'ing for sequencing and following commands. He was able to follow general commands about 25-50% of time. He will benefit from continuing with IP OT, w/ HHA at home upon d/c.     "

## 2020-03-23 NOTE — THERAPY EVALUATION
"Patient Name: Lux Mckoy  : 1970    MRN: [id number]                              Today's Date: 3/23/2020       Admit Date: 3/22/2020    Visit Dx:     ICD-10-CM ICD-9-CM   1. Acute CVA (cerebrovascular accident) (CMS/[facility]) I63.9 434.91   2. Decreased activities of daily living (ADL) Z78.9 V49.89     Patient Active Problem List   Diagnosis   • Allergic rhinitis   • Salvador esophagus   • Chronic pain   • Chronic prostatitis   • Depression   • Dyslipidemia   • GERD (gastroesophageal reflux disease)   • Hypertension   • Hypogonadism in male   • Neuropathy with IgA monoclonal gammopathy (CMS/[facility])   • Migraine   • Neuropathy   • Pulmonary nodule   • Vertigo   • Tinea versicolor   • Acute frontal sinusitis   • Acute ischemic stroke (CMS/[facility])   • Cerebrovascular atherosclerosis seen on imaging   • Tobacco use   • CKD (chronic kidney disease) stage 3, GFR 30-59 ml/min (CMS/[facility])     Past Medical History:   Diagnosis Date   • Dupuytren contracture     s/p bilateral hand surgery   • Dyspnea    • Kidney infection    • Pancreatitis      Past Surgical History:   Procedure Laterality Date   • CERVICAL DISCECTOMY ANTERIOR  2017    C6-7 at [facility]   • HAND SURGERY     • LUMBAR DISCECTOMY      \"Spinal\" times 2 in  and    • UMBILICAL HERNIA REPAIR      Dr. Diego Vasquez     General Information     Row Name 20 1551          PT Evaluation Time/Intention    Document Type  evaluation  -KG     Mode of Treatment  physical therapy  -KG     Row Name 20 1564          General Information    Patient Profile Reviewed?  yes  -KG     Prior Level of Function  independent:;all household mobility;community mobility;ADL's  -KG     Existing Precautions/Restrictions  fall  -KG     Barriers to Rehab  none identified  -KG     Row Name 20 9716          Relationship/Environment    Lives With  spouse  -KG     Row Name 20 8896          Resource/Environmental Concerns    Current Living Arrangements  " home/apartment/condo  -KG     Row Name 03/23/20 1556          Home Main Entrance    Number of Stairs, Main Entrance  one  -KG     Stair Railings, Main Entrance  none  -KG     Row Name 03/23/20 1556          Cognitive Assessment/Intervention- PT/OT    Orientation Status (Cognition)  oriented to;person;place;disoriented to;situation;time  -KG     Row Name 03/23/20 1556          Safety Issues, Functional Mobility    Safety Issues Affecting Function (Mobility)  at risk behavior observed;awareness of need for assistance;impulsivity;insight into deficits/self awareness;sequencing abilities  -KG     Impairments Affecting Function (Mobility)  cognition;coordination;endurance/activity tolerance;pain;sensation/sensory awareness;strength  -KG       User Key  (r) = Recorded By, (t) = Taken By, (c) = Cosigned By    Initials Name Provider Type    CHINA Josephine Cote Physical Therapist        Mobility     Row Name 03/23/20 1557          Bed Mobility Assessment/Treatment    Bed Mobility Assessment/Treatment  rolling right;supine-sit  -KG     Rolling Right Gerald (Bed Mobility)  supervision;verbal cues  -KG     Supine-Sit Gerald (Bed Mobility)  contact guard;verbal cues  -KG     Assistive Device (Bed Mobility)  bed rails;head of bed elevated  -KG     Comment (Bed Mobility)  slow processing, but able to get to EOB if given extra time for processing  -KG     Row Name 03/23/20 1558          Sit-Stand Transfer    Sit-Stand Gerald (Transfers)  contact guard;verbal cues  -KG     Assistive Device (Sit-Stand Transfers)  -- HHA, gait belt  -KG     Row Name 03/23/20 1551          Gait/Stairs Assessment/Training    Gait/Stairs Assessment/Training  gait/ambulation independence  -KG     [place] Level (Gait)  minimum assist (75% patient effort);1 person assist  -KG     Assistive Device (Gait)  -- HHA, gait belt  -KG     Distance in Feet (Gait)  100  -KG     Deviations/Abnormal Patterns (Gait)  ataxic;base of support,  narrow;gait speed decreased  -KG     Bilateral Gait Deviations  forward flexed posture;heel strike decreased  -KG     Comment (Gait/Stairs)  Pt ambulates with min-A, HHA and ataxic gait, 100 ft.  Pt does not realize how unsteady he is and needs encouragement to allow assist and to decrease speed to increase safety  -KG       User Key  (r) = Recorded By, (t) = Taken By, (c) = Cosigned By    Initials Name Provider Type    KG Josephine Cote Physical Therapist        Obj/Interventions     Row Name 03/23/20 1603          MMT (Manual Muscle Testing)    General MMT Comments  LLE 4-/5, RLE 5/5  -KG     Row Name 03/23/20 1603          Static Sitting Balance    Level of [place] (Unsupported Sitting, Static Balance)  contact guard assist  -KG     Sitting Position (Unsupported Sitting, Static Balance)  sitting on edge of bed  -KG     Time Able to Maintain Position (Unsupported Sitting, Static Balance)  4 to 5 minutes  -KG     Row Name 03/23/20 1603          Dynamic Sitting Balance    Level of [place], Reaches Outside Midline (Sitting, Dynamic Balance)  contact guard assist  -KG     Sitting Position, Reaches Outside Midline (Sitting, Dynamic Balance)  sitting on edge of bed  -KG     Row Name 03/23/20 1603          Static Standing Balance    Level of [place] (Supported Standing, Static Balance)  contact guard assist  -KG     Time Able to Maintain Position (Supported Standing, Static Balance)  1 to 2 minutes  -KG     Row Name 03/23/20 1603          Dynamic Standing Balance    Level of [place], Reaches Outside Midline (Standing, Dynamic Balance)  minimal assist, 75% patient effort  -KG     Time Able to Maintain Position, Reaches Outside Midline (Standing, Dynamic Balance)  30 to 45 seconds  -KG     Comment, Reaches Outside Midline (Standing, Dynamic Balance)  transferred to toilet, needed assist of gait belt and cues for sequencing, pt very unsteady  -KG       User Key  (r) = Recorded By, (t) = Taken By, (c)  = Cosigned By    Initials Name Provider Type    CHINA Josephine Cote Physical Therapist        Goals/Plan     Row Name 03/23/20 1608          Transfer Goal 1 (PT)    Activity/Assistive Device (Transfer Goal 1, PT)  sit-to-stand/stand-to-sit;bed-to-chair/chair-to-bed;toilet  -KG     [place] Level/Cues Needed (Transfer Goal 1, PT)  conditional independence  -KG     Time Frame (Transfer Goal 1, PT)  long term goal (LTG);10 days  -KG     Progress/Outcome (Transfer Goal 1, PT)  continuing progress toward goal  -KG     Row Name 03/23/20 1608          Gait Training Goal 1 (PT)    Activity/Assistive Device (Gait Training Goal 1, PT)  gait (walking locomotion);improve balance and speed;increase endurance/gait distance  -KG     [place] Level (Gait Training Goal 1, PT)  supervision required  -KG     Time Frame (Gait Training Goal 1, PT)  long term goal (LTG);10 days  -KG       User Key  (r) = Recorded By, (t) = Taken By, (c) = Cosigned By    Initials Name Provider Type    CHINA Josephine Cote Physical Therapist        Clinical Impression     Row Name 03/23/20 1604          Pain Assessment    Additional Documentation  Pain Scale: Numbers Pre/Post-Treatment (Group)  -KG     Row Name 03/23/20 1604          Pain Scale: Numbers Pre/Post-Treatment    Pain Scale: Numbers, Pretreatment  1/10  -KG     Pain Scale: Numbers, Post-Treatment  1/10  -KG     Pain Location  head  -KG     Pain Intervention(s)  Repositioned;Ambulation/increased activity  -KG     Row Name 03/23/20 1604          Plan of Care Review    Plan of Care Reviewed With  patient;spouse  -KG     Progress  no change  -KG     Outcome Summary  PT eval completed: Pt c/o feeling disoriented, unsteady and weak.  PLOF, indep all ADLs.  Currently presenting with coordination deficits, slow processing for sequencing and commands, and ataxic gait.  LLE weaker than RLE.  Pt ambulates with min-A, HHA and ataxic gait, 100 ft.  Pt does not realize how unsteady he is and needs  encouragement to allow assist and to decrease speed to increase safety.  Pt would benefit from inpatient rehab to address speech, OT, PT needs.    -KG     Row Name 03/23/20 1604          Physical Therapy Clinical Impression    Criteria for Skilled Interventions Met (PT Clinical Impression)  yes;treatment indicated  -KG     Rehab Potential (PT Clinical Summary)  good, to achieve stated therapy goals  -KG     Row Name 03/23/20 1604          Vital Signs    Pre Systolic BP Rehab  121  -KG     Pre Treatment Diastolic BP  92  -KG     Post Systolic BP Rehab  125  -KG     Post Treatment Diastolic BP  90  -KG     Row Name 03/23/20 1604          Positioning and Restraints    Pre-Treatment Position  in bed  -KG     Post Treatment Position  bed  -KG     In Bed  notified nsg;fowlers;call light within reach;encouraged to call for assist;exit alarm on  -KG       User Key  (r) = Recorded By, (t) = Taken By, (c) = Cosigned By    Initials Name Provider Type    Josephine Ang Physical Therapist        Outcome Measures     Row Name 03/23/20 1610          How much help from another person do you currently need...    Turning from your back to your side while in flat bed without using bedrails?  4  -KG     Moving from lying on back to sitting on the side of a flat bed without bedrails?  4  -KG     Moving to and from a bed to a chair (including a wheelchair)?  3  -KG     Standing up from a chair using your arms (e.g., wheelchair, bedside chair)?  3  -KG     Climbing 3-5 steps with a railing?  2  -KG     To walk in hospital room?  3  -KG     AM-PAC 6 Clicks Score (PT)  19  -KG     Row Name 03/23/20 1610          Functional Assessment    Outcome Measure Options  AM-PAC 6 Clicks Basic Mobility (PT)  -KG       User Key  (r) = Recorded By, (t) = Taken By, (c) = Cosigned By    Initials Name Provider Type    Josephine Ang Physical Therapist          PT Recommendation and Plan     Outcome Summary/Treatment Plan (PT)  Anticipated  Equipment Needs at Discharge (PT): front wheeled walker  Anticipated Discharge Disposition (PT): inpatient rehabilitation facility  Plan of Care Reviewed With: patient, spouse  Progress: no change  Outcome Summary: PT eval completed: Pt c/o feeling disoriented, unsteady and weak.  PLOF, indep all ADLs.  Currently presenting with coordination deficits, slow processing for sequencing and commands, and ataxic gait.  LLE weaker than RLE.  Pt ambulates with min-A, HHA and ataxic gait, 100 ft.  Pt does not realize how unsteady he is and needs encouragement to allow assist and to decrease speed to increase safety.  Pt would benefit from inpatient rehab to address speech, OT, PT needs.       Time Calculation:   PT Charges     Row Name 03/23/20 1611             Time Calculation    Start Time  1525  -KG      PT Received On  03/23/20  -KG      PT Goal Re-Cert Due Date  04/02/20  -KG        User Key  (r) = Recorded By, (t) = Taken By, (c) = Cosigned By    Initials Name Provider Type    KG Josephine Cote Physical Therapist        Therapy Charges for Today     Code Description Service Date Service Provider Modifiers Qty    [id number]  PT EVAL LOW COMPLEXITY 4 3/23/2020 Josephine Cote GP 1          PT G-Codes  Outcome Measure Options: AM-PAC 6 Clicks Basic Mobility (PT)  AM-PAC 6 Clicks Score (PT): 19  AM-PAC 6 Clicks Score (OT): 19    Josephine Cote  3/23/2020

## 2020-03-23 NOTE — CONSULTS
[facility] Consult Note      Referring Provider: No ref. provider found  Primary Provider:  Ozzie Castro MD  Reason for Consultation: Embolic CVA; Chest pain    Problem list:    1. Chest pain/shortness of breath:   a. Pulmonary evaluation.   b. Chronic pulmonary nodule, overall unchanged, followed by Dr. Mcknight and Gloria.   c. Echocardiogram 6/7/16: EF 55%, no sig valvular or structural disease  d. Stress test 6/7/2016: EF 63%, no ischemia on myocardial perfusion imaging.   2. Embolic CVA  a. CT head 3/22/20: Abnormal low-attenuation region left frontoparietal posterior left MCA distribution involvement extending to involve the cortex with sulcal effacement concerning for acute or evolving infarction without intracranial hemorrhage  b. CTA head/neck 3/22/20: No large vessel occlusion including only minimal atherosclerotic involvement of the carotid bifurcations, however within the MCA distributions bilaterally, there are mild to moderate irregularities concerning for atherosclerotic involvement without distinct occlusion left greater than right of the M3 and M4 segments in particular.  c. CT perfusion with and without contrast 3/22/20: Reversible ischemia within the left posterior MCA distribution without evidence for core infarct component.  d. Echocardiogram 3/22/20: EF 60%, no sig valvular or structural disease. Negative bubble study.   e. Carotid duplex 3/22/20: no stenosis.   f. MRI brain 3/22/20: Abnormal restriction throughout the bilateral cerebral hemispheres. Multifocal appearance greatest region of fairly confluent restriction in the left temporoparietal region as seen on perfusion and prior imaging same day consistent with acute multifocal infarctions of concern for embolic etiology given bilateral and multifocal distribution. No midline shift or hydrocephalus.  3. Hypertension.   4. Hyperlipidemia  a. FLP 3/23/20: Trig 228, HDL 28, , LDL 83.   5. IgA nephropathy.   6. GERD.    7. Depression.   8. Migraines.   9. BPH.   10. Degenerative disease.   11. Osteoarthritis.   12. Salvador's esophagus.   13. Tobacco abuse.   14. Surgical Hx:   a. Remote back surgery x2.   b. Three right hand surgeries and 2 left hand surgery.     Allergies:  Augmentin [amoxicillin-pot clavulanate] and Bactrim [sulfamethoxazole-trimethoprim]    Home/Current Medications:      Current Facility-Administered Medications:   •  acetaminophen (TYLENOL) tablet 650 mg, 650 mg, Oral, Q4H PRN, 650 mg at 03/23/20 0836 **OR** acetaminophen (TYLENOL) 160 MG/5ML solution 650 mg, 650 mg, Oral, Q4H PRN **OR** acetaminophen (TYLENOL) suppository 650 mg, 650 mg, Rectal, Q4H PRN, Joe Rasmussen MD  •  aspirin tablet 325 mg, 325 mg, Oral, Daily, 325 mg at 03/23/20 0836 **OR** aspirin suppository 300 mg, 300 mg, Rectal, Daily, Joe Rasmussen MD  •  atorvastatin (LIPITOR) tablet 80 mg, 80 mg, Oral, Nightly, Joe Rasmussen MD, 80 mg at 03/22/20 2025  •  azelastine (ASTELIN) nasal spray 2 spray, 2 spray, Each Nare, Daily, Joe Rasmussen MD, 2 spray at 03/23/20 0836  •  cetirizine (zyrTEC) tablet 10 mg, 10 mg, Oral, Daily PRN, Joe Rasmussen MD  •  clopidogrel (PLAVIX) tablet 75 mg, 75 mg, Oral, Daily, Scott Larson MD, 75 mg at 03/23/20 0836  •  dicyclomine (BENTYL) tablet 10 mg, 10 mg, Oral, Daily With Breakfast, Lunch & Dinner, Joe Rasmussen MD, 10 mg at 03/23/20 0836  •  docusate sodium (COLACE) capsule 100 mg, 100 mg, Oral, BID PRN, Joe Rasmussen MD  •  escitalopram (LEXAPRO) tablet 10 mg, 10 mg, Oral, Daily, Joe Rasmussen MD, 10 mg at 03/23/20 0836  •  famotidine (PEPCID) tablet 20 mg, 20 mg, Oral, BID PRN, Joe Rasmussen MD  •  famotidine (PEPCID) tablet 20 mg, 20 mg, Oral, Nightly, Joe Rasmussen MD, 20 mg at 03/22/20 2026  •  folic acid (FOLVITE) tablet 1 mg, 1 mg, Oral, Daily, Joe Rasmussen MD, 1 mg at  03/23/20 0836  •  heparin (porcine) 5000 UNIT/ML injection 5,000 Units, 5,000 Units, Subcutaneous, Q8H, Joe Rasmussen MD, 5,000 Units at 03/23/20 0604  •  melatonin tablet 5 mg, 5 mg, Oral, Nightly PRN, Joe Rasmussen MD, 5 mg at 03/22/20 2026  •  ondansetron (ZOFRAN) tablet 4 mg, 4 mg, Oral, Q6H PRN **OR** ondansetron (ZOFRAN) injection 4 mg, 4 mg, Intravenous, Q6H PRN, oJe Rasmussen MD  •  oxyCODONE-acetaminophen (PERCOCET) 5-325 MG per tablet 1 tablet, 1 tablet, Oral, Q6H PRN, Joe Rasmussen MD, 1 tablet at 03/23/20 0918  •  pantoprazole (PROTONIX) EC tablet 40 mg, 40 mg, Oral, Q AM, Joe Rasmussen MD, 40 mg at 03/23/20 0604  •  sodium chloride 0.9 % flush 10 mL, 10 mL, Intravenous, PRN, Joe Rasmussen MD  •  sodium chloride 0.9 % flush 10 mL, 10 mL, Intravenous, Q12H, Joe Rasmussen MD, 10 mL at 03/22/20 1250  •  sodium chloride 0.9 % flush 10 mL, 10 mL, Intravenous, PRN, Joe Rasmussen MD  •  sodium chloride 0.9 % flush 10 mL, 10 mL, Intravenous, Q12H, Joe Rasmussen MD, 10 mL at 03/23/20 0836  •  sodium chloride 0.9 % flush 10 mL, 10 mL, Intravenous, PRN, Joe Rasmussen MD      History of present illness:  Mr. Mckoy is a 50 y/o male with the above noted past medical history who presented to [facility] on 3/22/20 with complaints of confusion, difficulty speaking, right-sided numbness and ataxic gain x 36 hours.  He is right-handed upon arrival to ED, he was found to have an acute ischemic stroke and was admitted to telemetry via hospital medicine team. Extensive workup is detailed above in problem list. Neurology was consulted and evaluate patient on 3/22/20 with suspicions for a cardiac source.  MRI shows scattered bilateral areas of acute infarction.    Patient has no known prior history of PAF, PFO. No prior TIAs or CVAs. He reports having substernal to right sided chest tightness and feeling like he could not  "catch his breath Thursday night of last week.  He states this lasted about 30 to 40 minutes.  He denies other similar episodes.  .  He denies any exacerbating/alleviating factors. Tightness and SOB just resolved. He has not had similar symptoms in the recent past and none since. He is not currently working due to chronic back pain with multiple surgeries, does not do much that requires exertion.  Troponin on presentation was mildly elevated at 0.07.  EKG did not show acute ischemic changes.    Social History:  Social History     Socioeconomic History   • Marital status:      Spouse name: Not on file   • Number of children: Not on file   • Years of education: Not on file   • Highest education level: Not on file   Tobacco Use   • Smoking status: Current Every Day Smoker     Packs/day: 0.50       Family History:  Family History   Problem Relation Age of Onset   • Hypertension Other    • Other Other        Review of Systems  Pertinent positives are listed in the HPI.  All other systems reviewed are negative.     Objective     Vital Sign Min/Max for last 24 hours  Temp  Min: 97.4 °F (36.3 °C)  Max: 98.2 °F (36.8 °C)   BP  Min: 118/82  Max: 139/104   Pulse  Min: 67  Max: 76   Resp  Min: 16  Max: 18   SpO2  Min: 91 %  Max: 99 %   No data recorded   Weight  Min: 90.7 kg (200 lb)  Max: 90.7 kg (200 lb)     Flowsheet Rows      First Filed Value   Admission Height  195.6 cm (77\") Documented at 03/22/2020 1116   Admission Weight  90.7 kg (200 lb) Documented at 03/22/2020 1116          Physical Exam:  GENERAL: This is a well-developed, well-nourished, male who is in no acute distress.  He is intermittently confused, was not aware of his current age does not know his birthday.  Normal mood and affect.   SKIN: Pink and warm without rash or abnormality noted.   HEENT: Head is normocephalic and atraumatic. Pupils are equal and reactive to light bilaterally. Mucous membranes are pink and moist.   NECK: Supple without " lymphadenopathy or thyromegaly. There is no jugular venous distention at 30°.   LUNGS: Clear to auscultation bilaterally without wheezing, rhonchi, or rales noted.   CARDIOVASCULAR: The heart has a regular rate with a normal S1 and S2. There is no murmur, gallop, rub, or click appreciated. The PMI is nondisplaced. Carotid upstrokes are 2+ and symmetrical without bruits.   ABDOMEN: Soft and nondistended with positive bowel sounds x4. The patient denies tenderness of palpitation.   MUSCULOSKELETAL: There are no obvious bony abnormalities.  Right arm is wrapped due to his recent outpatient surgery nEUROLOGICAL: Nonfocal.   PERIPHERAL VASCULAR: Femoral pulses are 2+ and symmetrical without bruits. Posterior tibial and dorsalis pedis pulses are 2+ and symmetrical. There is no peripheral edema.      EKG:  NSR, no acute ischemic changes  Tele:  NSR    Labs:      Lab Results   Component Value Date    WBC 8.70 03/23/2020    HGB 15.8 03/23/2020    HCT 46.9 03/23/2020    MCV 88.0 03/23/2020     03/23/2020     Lab Results   Component Value Date    GLUCOSE 94 03/23/2020    BUN 18 03/23/2020    CREATININE 1.55 (H) 03/23/2020    EGFRIFNONA 48 (L) 03/23/2020    BCR 11.6 03/23/2020    K 4.1 03/23/2020    CO2 20.0 (L) 03/23/2020    CALCIUM 9.0 03/23/2020    ALBUMIN 4.6 04/05/2016    AST 27 03/22/2020    ALT 27 03/22/2020     Lab Results   Component Value Date    TROPONINT 0.070 (C) 03/22/2020     Lab Results   Component Value Date    INR 1.1 03/22/2020    PROTIME 13.3 03/22/2020     Lab Results   Component Value Date    CHOL 157 03/23/2020    CHLPL 210 (H) 01/27/2015    TRIG 228 (H) 03/23/2020    HDL 28 (L) 03/23/2020    LDL 83 03/23/2020        Echo:  · Estimated EF = 60%.  · Left ventricular systolic function is normal.  · Negative bubble study      Results Review:  I reviewed the patients new clinical results.      Assessment:  1) CVA, suspected embolic  - MRI brain: findings consistent with acute multifocal infarctions of  concern for embolic etiology given bilateral and multifocal distribution  - Was not treated with tPA since he presented 36 hours after symptom onset.  - Per neurology concerns for cardiac source given MRI distribution.   - Normal TTE with negative bubble study, no source.  - No PAF on tele thus far.   - On ASA,Plavix, statin.  -We will plan for CATY and further atrial fibrillation monitoring    2) Chest pain   - Initial troponin 0.07. No ischemic changes on EKG. Remote negative stress test.   -We will repeat troponin today to trend plan repeat stress testing tomorrow    3) HTN, controlled.     4) Tobacco abuse  - counseled on need for cessation.     5) CKD  IgA nephropathy  - per medicine, avoid nephrotoxic agents.     6) Dyslipidemia  - reviewed FLP. Continue high-dose statin therapy for #1.       Plan:  - Continue ASA, Plavix, statin therapy for now  - Monitor for Afib/flutter on telemetry. If none seen this admission would send home with 30 day event monitor.   - Plan to proceed with CATY to evaluate for cardioembolic. The risks, benefits, and alternatives of the procedure have been reviewed and the patient wishes to proceed.   -Pending work-up may consider discharge with empiric anticoagulation given MRI is strongly suspicious for embolic source  -We will plan stress test tomorrow for further evaluation of chest pain with several cardiovascular risk factors, and mildly elevated troponin on presentation.         I discussed the patients findings and my recommendations with patient.    Scribed for Joey Velazquez MD by ALDO Neves. 3/23/2020  09:18    I,Joey Velazquez M.D., personally performed the services described in this documentation as scribed by the above named individual in my presence, and it is both accurate and complete.  3/23/2020  10:58

## 2020-03-24 ENCOUNTER — APPOINTMENT (OUTPATIENT)
Dept: CARDIOLOGY | Facility: HOSPITAL | Age: 50
End: 2020-03-24

## 2020-03-24 PROBLEM — R77.8 ELEVATED TROPONIN: Status: ACTIVE | Noted: 2020-03-24

## 2020-03-24 PROBLEM — R79.89 ELEVATED TROPONIN: Status: ACTIVE | Noted: 2020-03-24

## 2020-03-24 LAB
ANION GAP SERPL CALCULATED.3IONS-SCNC: 13 MMOL/L (ref 5–15)
BH CV ECHO MEAS - BSA(HAYCOCK): 2.2 M^2
BH CV ECHO MEAS - BSA(HAYCOCK): 2.2 M^2
BH CV ECHO MEAS - BSA: 2.2 M^2
BH CV ECHO MEAS - BSA: 2.2 M^2
BH CV ECHO MEAS - BZI_BMI: 26.4 KILOGRAMS/M^2
BH CV ECHO MEAS - BZI_BMI: 26.4 KILOGRAMS/M^2
BH CV ECHO MEAS - BZI_METRIC_HEIGHT: 185.4 CM
BH CV ECHO MEAS - BZI_METRIC_HEIGHT: 185.4 CM
BH CV ECHO MEAS - BZI_METRIC_WEIGHT: 90.7 KG
BH CV ECHO MEAS - BZI_METRIC_WEIGHT: 90.7 KG
BH CV STRESS BP STAGE 2: NORMAL
BH CV STRESS BP STAGE 4: NORMAL
BH CV STRESS COMMENTS STAGE 1: NORMAL
BH CV STRESS DOSE REGADENOSON STAGE 1: 0.4
BH CV STRESS DURATION MIN STAGE 1: 1
BH CV STRESS DURATION MIN STAGE 2: 1
BH CV STRESS DURATION MIN STAGE 3: 1
BH CV STRESS DURATION MIN STAGE 4: 1
BH CV STRESS DURATION SEC STAGE 1: 0
BH CV STRESS DURATION SEC STAGE 2: 0
BH CV STRESS DURATION SEC STAGE 3: 0
BH CV STRESS DURATION SEC STAGE 4: 0
BH CV STRESS HR STAGE 1: 93
BH CV STRESS HR STAGE 2: 105
BH CV STRESS HR STAGE 3: 107
BH CV STRESS HR STAGE 4: 98
BH CV STRESS O2 STAGE 1: 98
BH CV STRESS O2 STAGE 2: 98
BH CV STRESS O2 STAGE 3: 98
BH CV STRESS O2 STAGE 4: 98
BH CV STRESS PROTOCOL 1: NORMAL
BH CV STRESS RECOVERY BP: NORMAL MMHG
BH CV STRESS RECOVERY HR: 85 BPM
BH CV STRESS RECOVERY O2: 96 %
BH CV STRESS STAGE 1: 1
BH CV STRESS STAGE 2: 2
BH CV STRESS STAGE 3: 3
BH CV STRESS STAGE 4: 4
BH CV VAS BP LEFT ARM: NORMAL MMHG
BH CV VAS BP RIGHT ARM: NORMAL MMHG
BUN BLD-MCNC: 18 MG/DL (ref 6–20)
BUN/CREAT SERPL: 10.7 (ref 7–25)
CALCIUM SPEC-SCNC: 9.2 MG/DL (ref 8.6–10.5)
CARDIOLIPIN IGA SER IA-ACNC: <9 APL U/ML (ref 0–11)
CARDIOLIPIN IGG SER IA-ACNC: <9 GPL U/ML (ref 0–14)
CARDIOLIPIN IGM SER IA-ACNC: <9 MPL U/ML (ref 0–12)
CHLORIDE SERPL-SCNC: 104 MMOL/L (ref 98–107)
CO2 SERPL-SCNC: 21 MMOL/L (ref 22–29)
CREAT BLD-MCNC: 1.68 MG/DL (ref 0.76–1.27)
DEPRECATED RDW RBC AUTO: 40.6 FL (ref 37–54)
ERYTHROCYTE [DISTWIDTH] IN BLOOD BY AUTOMATED COUNT: 12.4 % (ref 12.3–15.4)
GFR SERPL CREATININE-BSD FRML MDRD: 44 ML/MIN/1.73
GLUCOSE BLD-MCNC: 88 MG/DL (ref 65–99)
HCT VFR BLD AUTO: 46.4 % (ref 37.5–51)
HGB BLD-MCNC: 15.9 G/DL (ref 13–17.7)
LV EF 2D ECHO EST: 60 %
LV EF NUC BP: 69 %
MAXIMAL PREDICTED HEART RATE: 172 BPM
MCH RBC QN AUTO: 30.4 PG (ref 26.6–33)
MCHC RBC AUTO-ENTMCNC: 34.3 G/DL (ref 31.5–35.7)
MCV RBC AUTO: 88.7 FL (ref 79–97)
PERCENT MAX PREDICTED HR: 63.95 %
PLATELET # BLD AUTO: 265 10*3/MM3 (ref 140–450)
PMV BLD AUTO: 10.5 FL (ref 6–12)
POTASSIUM BLD-SCNC: 4.2 MMOL/L (ref 3.5–5.2)
RBC # BLD AUTO: 5.23 10*6/MM3 (ref 4.14–5.8)
SODIUM BLD-SCNC: 138 MMOL/L (ref 136–145)
STRESS BASELINE BP: NORMAL MMHG
STRESS BASELINE HR: 66 BPM
STRESS O2 SAT REST: 96 %
STRESS PERCENT HR: 75 %
STRESS POST ESTIMATED WORKLOAD: 1 METS
STRESS POST EXERCISE DUR MIN: 4 MIN
STRESS POST EXERCISE DUR SEC: 0 SEC
STRESS POST O2 SAT PEAK: 98 %
STRESS POST PEAK BP: NORMAL MMHG
STRESS POST PEAK HR: 110 BPM
STRESS TARGET HR: 146 BPM
WBC NRBC COR # BLD: 8.97 10*3/MM3 (ref 3.4–10.8)

## 2020-03-24 PROCEDURE — 85027 COMPLETE CBC AUTOMATED: CPT | Performed by: INTERNAL MEDICINE

## 2020-03-24 PROCEDURE — 25010000002 REGADENOSON 0.4 MG/5ML SOLUTION: Performed by: INTERNAL MEDICINE

## 2020-03-24 PROCEDURE — 25010000002 HEPARIN (PORCINE) PER 1000 UNITS: Performed by: INTERNAL MEDICINE

## 2020-03-24 PROCEDURE — 92523 SPEECH SOUND LANG COMPREHEN: CPT

## 2020-03-24 PROCEDURE — 78492 MYOCRD IMG PET MLT RST&STRS: CPT | Performed by: INTERNAL MEDICINE

## 2020-03-24 PROCEDURE — 93017 CV STRESS TEST TRACING ONLY: CPT

## 2020-03-24 PROCEDURE — 78492 MYOCRD IMG PET MLT RST&STRS: CPT

## 2020-03-24 PROCEDURE — 99233 SBSQ HOSP IP/OBS HIGH 50: CPT | Performed by: INTERNAL MEDICINE

## 2020-03-24 PROCEDURE — 0 RUBIDIUM CHLORIDE: Performed by: INTERNAL MEDICINE

## 2020-03-24 PROCEDURE — 97535 SELF CARE MNGMENT TRAINING: CPT

## 2020-03-24 PROCEDURE — 93312 ECHO TRANSESOPHAGEAL: CPT | Performed by: INTERNAL MEDICINE

## 2020-03-24 PROCEDURE — 99232 SBSQ HOSP IP/OBS MODERATE 35: CPT | Performed by: INTERNAL MEDICINE

## 2020-03-24 PROCEDURE — 80048 BASIC METABOLIC PNL TOTAL CA: CPT | Performed by: INTERNAL MEDICINE

## 2020-03-24 PROCEDURE — 99232 SBSQ HOSP IP/OBS MODERATE 35: CPT | Performed by: PSYCHIATRY & NEUROLOGY

## 2020-03-24 PROCEDURE — 93312 ECHO TRANSESOPHAGEAL: CPT

## 2020-03-24 PROCEDURE — 93325 DOPPLER ECHO COLOR FLOW MAPG: CPT | Performed by: INTERNAL MEDICINE

## 2020-03-24 PROCEDURE — 93321 DOPPLER ECHO F-UP/LMTD STD: CPT | Performed by: INTERNAL MEDICINE

## 2020-03-24 PROCEDURE — 93325 DOPPLER ECHO COLOR FLOW MAPG: CPT

## 2020-03-24 PROCEDURE — 97530 THERAPEUTIC ACTIVITIES: CPT

## 2020-03-24 PROCEDURE — 93018 CV STRESS TEST I&R ONLY: CPT | Performed by: INTERNAL MEDICINE

## 2020-03-24 PROCEDURE — 97116 GAIT TRAINING THERAPY: CPT

## 2020-03-24 PROCEDURE — 93321 DOPPLER ECHO F-UP/LMTD STD: CPT

## 2020-03-24 PROCEDURE — A9555 RB82 RUBIDIUM: HCPCS | Performed by: INTERNAL MEDICINE

## 2020-03-24 RX ORDER — SUMATRIPTAN 50 MG/1
50 TABLET, FILM COATED ORAL
Status: DISCONTINUED | OUTPATIENT
Start: 2020-03-24 | End: 2020-03-25 | Stop reason: HOSPADM

## 2020-03-24 RX ADMIN — OXYCODONE HYDROCHLORIDE AND ACETAMINOPHEN 1 TABLET: 5; 325 TABLET ORAL at 18:08

## 2020-03-24 RX ADMIN — REGADENOSON 0.4 MG: 0.08 INJECTION, SOLUTION INTRAVENOUS at 09:05

## 2020-03-24 RX ADMIN — ACETAMINOPHEN 650 MG: 325 TABLET, FILM COATED ORAL at 20:41

## 2020-03-24 RX ADMIN — RUBIDIUM CHLORIDE RB-82 1 DOSE: 150 INJECTION, SOLUTION INTRAVENOUS at 08:55

## 2020-03-24 RX ADMIN — DICYCLOMINE HYDROCHLORIDE 10 MG: 20 TABLET ORAL at 14:49

## 2020-03-24 RX ADMIN — Medication 5 MG: at 20:41

## 2020-03-24 RX ADMIN — DICYCLOMINE HYDROCHLORIDE 10 MG: 20 TABLET ORAL at 08:27

## 2020-03-24 RX ADMIN — ATORVASTATIN CALCIUM 80 MG: 40 TABLET, FILM COATED ORAL at 20:28

## 2020-03-24 RX ADMIN — FAMOTIDINE 20 MG: 20 TABLET ORAL at 20:28

## 2020-03-24 RX ADMIN — RUBIDIUM CHLORIDE RB-82 1 DOSE: 150 INJECTION, SOLUTION INTRAVENOUS at 09:06

## 2020-03-24 RX ADMIN — SODIUM CHLORIDE, PRESERVATIVE FREE 10 ML: 5 INJECTION INTRAVENOUS at 08:29

## 2020-03-24 RX ADMIN — ESCITALOPRAM OXALATE 10 MG: 10 TABLET ORAL at 08:28

## 2020-03-24 RX ADMIN — HEPARIN SODIUM 5000 UNITS: 5000 INJECTION, SOLUTION INTRAVENOUS; SUBCUTANEOUS at 20:28

## 2020-03-24 RX ADMIN — PANTOPRAZOLE SODIUM 40 MG: 40 TABLET, DELAYED RELEASE ORAL at 06:15

## 2020-03-24 RX ADMIN — HEPARIN SODIUM 5000 UNITS: 5000 INJECTION, SOLUTION INTRAVENOUS; SUBCUTANEOUS at 14:49

## 2020-03-24 RX ADMIN — ASPIRIN 325 MG ORAL TABLET 325 MG: 325 PILL ORAL at 08:27

## 2020-03-24 RX ADMIN — DICYCLOMINE HYDROCHLORIDE 10 MG: 20 TABLET ORAL at 18:08

## 2020-03-24 RX ADMIN — SODIUM CHLORIDE, PRESERVATIVE FREE 10 ML: 5 INJECTION INTRAVENOUS at 20:29

## 2020-03-24 RX ADMIN — HEPARIN SODIUM 5000 UNITS: 5000 INJECTION, SOLUTION INTRAVENOUS; SUBCUTANEOUS at 06:15

## 2020-03-24 RX ADMIN — CLOPIDOGREL BISULFATE 75 MG: 75 TABLET ORAL at 08:28

## 2020-03-24 RX ADMIN — ACETAMINOPHEN 650 MG: 325 TABLET, FILM COATED ORAL at 08:28

## 2020-03-24 RX ADMIN — FOLIC ACID 1 MG: 1 TABLET ORAL at 08:27

## 2020-03-24 NOTE — PROGRESS NOTES
Continued Stay Note  [facility]     Patient Name: Lux Mckoy  MRN: [id number]  Today's Date: 3/24/2020    Admit Date: 3/22/2020    Discharge Plan     Row Name 03/24/20 1445       Plan    Plan  Home vs rehab    Plan Comments  I spoke with his wife Meera and also with patient. They agree to have referral to [facility] and for [facility] to initiate insurance authorization. He may end up declining this in the end. Giselle Waite RN with [facility] has the word to proceed with plans.     Final Discharge Disposition Code  62 - inpatient rehab facility    Row Name                     Discharge Codes    No documentation.       Expected Discharge Date and Time     Expected Discharge Date Expected Discharge Time    Mar 26, 2020             Delaney Brody RN

## 2020-03-24 NOTE — PLAN OF CARE
Problem: Patient Care Overview  Goal: Plan of Care Review  Flowsheets (Taken 3/24/2020 4754)  Outcome Summary: Upon OT arrival, pt up in bed. He was oriented to person, place, and situation. Pt completed bed mobility w/ spv assist. He ambulated from bed to bathroom/toilet, completed toileting tasks w/ spv assist. He stood at sink to complete grooming tasks w/ CGA for safety. Pt ambulated back to bed w/ CGA. There he sat EOB to complete FMC tasks w/ RUE, edu on HEP. Continue with IP OT.

## 2020-03-24 NOTE — THERAPY TREATMENT NOTE
"Patient Name: Lux Mckoy  : 1970    MRN: [id number]                              Today's Date: 3/24/2020       Admit Date: 3/22/2020    Visit Dx:     ICD-10-CM ICD-9-CM   1. Acute CVA (cerebrovascular accident) (CMS/[facility]) I63.9 434.91   2. Decreased activities of daily living (ADL) Z78.9 V49.89   3. Cognitive communication deficit R41.841 799.52   4. Aphasia R47.01 784.3     Patient Active Problem List   Diagnosis   • Allergic rhinitis   • [place] esophagus   • Chronic pain   • Chronic prostatitis   • Depression   • Dyslipidemia   • GERD (gastroesophageal reflux disease)   • Hypertension   • Hypogonadism in male   • Neuropathy with IgA monoclonal gammopathy (CMS/[facility])   • Migraine   • Neuropathy   • Pulmonary nodule   • Vertigo   • Tinea versicolor   • Acute frontal sinusitis   • Acute ischemic stroke (CMS/[facility])   • Cerebrovascular atherosclerosis seen on imaging   • Tobacco use   • CKD (chronic kidney disease) stage 3, GFR 30-59 ml/min (CMS/[facility])   • Elevated troponin     Past Medical History:   Diagnosis Date   • Dupuytren contracture     s/p bilateral hand surgery   • Dyspnea    • Kidney infection    • Pancreatitis      Past Surgical History:   Procedure Laterality Date   • CERVICAL DISCECTOMY ANTERIOR  2017    C6-7 at [facility]   • HAND SURGERY     • LUMBAR DISCECTOMY      \"Spinal\" times 2 in  and    • UMBILICAL HERNIA REPAIR      Dr. Diego Vasquez     General Information     Row Name 20 1541          PT Evaluation Time/Intention    Document Type  therapy note (daily note)  -KR     Mode of Treatment  physical therapy  -KR     Row Name 20 1541          General Information    Existing Precautions/Restrictions  fall  -KR     Row Name 20 1541          Cognitive Assessment/Intervention- PT/OT    Orientation Status (Cognition)  oriented x 3  -KR     Row Name 20 1541          Safety Issues, Functional Mobility    Safety Issues Affecting Function (Mobility)  insight " into deficits/self awareness;safety precaution awareness;safety precautions follow-through/compliance  -KR     Impairments Affecting Function (Mobility)  balance;cognition;endurance/activity tolerance;strength  -KR       User Key  (r) = Recorded By, (t) = Taken By, (c) = Cosigned By    Initials Name Provider Type    Neisha Macedo PT Physical Therapist        Mobility     Row Name 03/24/20 1542          Bed Mobility Assessment/Treatment    Comment (Bed Mobility)  Pt seated EOB upon arrival and at end of treatment.   -KR     Row Name 03/24/20 1542          Transfer Assessment/Treatment    Comment (Transfers)  VC's for sequencing and increased safety awareness. Pt slightly unsteady upon initial stand.   -KR     Row Name 03/24/20 1542          Sit-Stand Transfer    Sit-Stand [place] (Transfers)  contact guard;verbal cues  -KR     Row Name 03/24/20 1542          Gait/Stairs Assessment/Training    Gait/Stairs Assessment/Training  gait/ambulation independence  -KR     [place] Level (Gait)  contact guard;verbal cues  -KR     Assistive Device (Gait)  other (see comments) L UE support  -KR     Distance in Feet (Gait)  200  -KR     Pattern (Gait)  step-through  -KR     Deviations/Abnormal Patterns (Gait)  base of support, narrow;prudencio decreased;stride length decreased  -KR     Bilateral Gait Deviations  forward flexed posture;heel strike decreased  -KR     Comment (Gait/Stairs)  Pt demonstrated step through gait pattern with slow prudencio and decreased step length. VC's for upright posture. Pt with periods of unsteady gait, unable to correct despite cueing. Pt declined any AD or HHA.   -KR       User Key  (r) = Recorded By, (t) = Taken By, (c) = Cosigned By    Initials Name Provider Type    Neisha Macedo PT Physical Therapist        Obj/Interventions     Row Name 03/24/20 1604          Therapeutic Exercise    Lower Extremity (Therapeutic Exercise)  marching while seated;LAQ (long arc quad), bilateral   -KR     Lower Extremity Range of Motion (Therapeutic Exercise)  ankle dorsiflexion/plantar flexion, bilateral  -KR     Exercise Type (Therapeutic Exercise)  AROM (active range of motion)  -KR     Position (Therapeutic Exercise)  seated  -KR     Sets/Reps (Therapeutic Exercise)  1/5  -KR     Row Name 03/24/20 1557          Static Sitting Balance    Level of [place] (Unsupported Sitting, Static Balance)  supervision  -KR     Sitting Position (Unsupported Sitting, Static Balance)  sitting on edge of bed  -KR     Row Name 03/24/20 1557          Static Standing Balance    Level of [place] (Supported Standing, Static Balance)  contact guard assist  -KR     Row Name 03/24/20 1557          Dynamic Standing Balance    Level of [place], Reaches Outside Midline (Standing, Dynamic Balance)  contact guard assist  -KR       User Key  (r) = Recorded By, (t) = Taken By, (c) = Cosigned By    Initials Name Provider Type    Neisha Macedo, PT Physical Therapist        Goals/Plan    No documentation.       Clinical Impression     Row Name 03/24/20 1557          Pain Assessment    Additional Documentation  Pain Scale: Numbers Pre/Post-Treatment (Group)  -KR     Row Name 03/24/20 1557          Pain Scale: Numbers Pre/Post-Treatment    Pain Scale: Numbers, Pretreatment  0/10 - no pain  -KR     Pain Scale: Numbers, Post-Treatment  0/10 - no pain  -KR     Row Name 03/24/20 155          Vital Signs    Pre Systolic BP Rehab  124  -KR     Pre Treatment Diastolic BP  92  -KR     Pretreatment Heart Rate (beats/min)  78  -KR     Posttreatment Heart Rate (beats/min)  80  -KR     O2 Delivery Pre Treatment  room air  -KR     O2 Delivery Post Treatment  room air  -KR     Row Name 03/24/20 1557          Positioning and Restraints    Pre-Treatment Position  in bed  -KR     Post Treatment Position  bed  -KR     In Bed  notified nsg;sitting EOB;call light within reach;encouraged to call for assist  -KR       User Key  (r) = Recorded  By, (t) = Taken By, (c) = Cosigned By    Initials Name Provider Type    Neisha Macedo PT Physical Therapist        Outcome Measures     Row Name 03/24/20 1601          How much help from another person do you currently need...    Turning from your back to your side while in flat bed without using bedrails?  4  -KR     Moving from lying on back to sitting on the side of a flat bed without bedrails?  3  -KR     Moving to and from a bed to a chair (including a wheelchair)?  3  -KR     Standing up from a chair using your arms (e.g., wheelchair, bedside chair)?  3  -KR     Climbing 3-5 steps with a railing?  2  -KR     To walk in hospital room?  3  -KR     AM-PAC 6 Clicks Score (PT)  18  -KR     Row Name 03/24/20 1601          Functional Assessment    Outcome Measure Options  AM-PAC 6 Clicks Basic Mobility (PT)  -KR       User Key  (r) = Recorded By, (t) = Taken By, (c) = Cosigned By    Initials Name Provider Type    Neisha Macedo PT Physical Therapist          PT Recommendation and Plan     Plan of Care Reviewed With: patient  Progress: improving  Outcome Summary: Pt increased ambulation distance to 200ft with CGA. Pt declined any AD or HHA. Pt with periods of unsteady gait; unable to correct despite cueing. Pt denied any c/o pain. Continue to progress as appropriate.     Time Calculation:   PT Charges     Row Name 03/24/20 1423             Time Calculation    Start Time  1423  -KR      PT Received On  03/24/20  -KR      PT Goal Re-Cert Due Date  04/02/20  -KR         Time Calculation- PT    Total Timed Code Minutes- PT  24 minute(s)  -KR         Timed Charges    31785 - Gait Training Minutes   24  -KR        User Key  (r) = Recorded By, (t) = Taken By, (c) = Cosigned By    Initials Name Provider Type    Neisha Macedo PT Physical Therapist        Therapy Charges for Today     Code Description Service Date Service Provider Modifiers Qty    14619319968 HC GAIT TRAINING EA 15 MIN 3/24/2020 Neisha Alvarez  N, PT GP 2          PT G-Codes  Outcome Measure Options: AM-PAC 6 Clicks Basic Mobility (PT)  AM-PAC 6 Clicks Score (PT): 18  AM-PAC 6 Clicks Score (OT): 20    Aixa Alvarez, PT  3/24/2020

## 2020-03-24 NOTE — THERAPY TREATMENT NOTE
"Acute Care - Occupational Therapy Treatment Note  [facility]     Patient Name: Lux Mckoy  : 1970  MRN: [id number]  Today's Date: 3/24/2020  Onset of Illness/Injury or Date of Surgery: 20  Date of Referral to OT: 20       Admit Date: 3/22/2020       ICD-10-CM ICD-9-CM   1. Acute CVA (cerebrovascular accident) (CMS/[facility]) I63.9 434.91   2. Decreased activities of daily living (ADL) Z78.9 V49.89   3. Cognitive communication deficit R41.841 799.52   4. Aphasia R47.01 784.3     Patient Active Problem List   Diagnosis   • Allergic rhinitis   • [place] esophagus   • Chronic pain   • Chronic prostatitis   • Depression   • Dyslipidemia   • GERD (gastroesophageal reflux disease)   • Hypertension   • Hypogonadism in male   • Neuropathy with IgA monoclonal gammopathy (CMS/[facility])   • Migraine   • Neuropathy   • Pulmonary nodule   • Vertigo   • Tinea versicolor   • Acute frontal sinusitis   • Acute ischemic stroke (CMS/[facility])   • Cerebrovascular atherosclerosis seen on imaging   • Tobacco use   • CKD (chronic kidney disease) stage 3, GFR 30-59 ml/min (CMS/[facility])   • Elevated troponin     Past Medical History:   Diagnosis Date   • Dupuytren contracture     s/p bilateral hand surgery   • Dyspnea    • Kidney infection    • Pancreatitis      Past Surgical History:   Procedure Laterality Date   • CERVICAL DISCECTOMY ANTERIOR  2017    C6-7 at [facility] in [place]   • HAND SURGERY     • LUMBAR DISCECTOMY      \"Spinal\" times 2 in  and    • UMBILICAL HERNIA REPAIR      Dr. Diego Vasquez       Therapy Treatment    Rehabilitation Treatment Summary     Row Name 20 1318             Treatment Time/Intention    Discipline  occupational therapist  -SG      Document Type  therapy note (daily note)  -SG      Subjective Information  no complaints  -SG      Mode of Treatment  occupational therapy  -SG      Patient/Family Observations  Pt up in bed, no family present at this time  -SG      Therapy Frequency (OT " Eval)  daily  -SG      Patient Effort  good  -SG      Existing Precautions/Restrictions  fall  -SG      Recorded by [SG] Sabine Olivo OTR/L 03/24/20 1426      Row Name 03/24/20 1318             Vital Signs    Pre Systolic BP Rehab  132  -SG      Pre Treatment Diastolic BP  79  -SG      Pretreatment Heart Rate (beats/min)  73  -SG      Pre SpO2 (%)  95  -SG      Pre Patient Position  Supine  -SG      Intra Patient Position  Standing  -SG      Post Patient Position  Supine  -SG      Recorded by [SG] Sabine Olivo OTR/L 03/24/20 1426      Row Name 03/24/20 1318             Cognitive Assessment/Intervention- PT/OT    Orientation Status (Cognition)  oriented to;person;place;situation  -SG      Follows Commands (Cognition)  follows one step commands;over 90% accuracy  -SG      Cognitive Function (Cognitive)  safety deficit  -SG      Safety Deficit (Cognitive)  awareness of need for assistance;insight into deficits/self awareness;safety precautions awareness  -SG      Personal Safety Interventions  gait belt;nonskid shoes/slippers when out of bed  -SG      Recorded by [SG] Sabine Olivo OTR/L 03/24/20 1426      Row Name 03/24/20 1318             Safety Issues, Functional Mobility    Safety Issues Affecting Function (Mobility)  insight into deficits/self awareness;safety precaution awareness  -SG      Impairments Affecting Function (Mobility)  cognition;coordination;endurance/activity tolerance;strength  -SG      Recorded by [SG] Sabine Olivo OTR/L 03/24/20 1426      Row Name 03/24/20 1318             Bed Mobility Assessment/Treatment    Bed Mobility Assessment/Treatment  supine-sit;sit-supine  -SG      Supine-Sit [place] (Bed Mobility)  supervision;verbal cues  -SG      Sit-Supine [place] (Bed Mobility)  supervision;verbal cues  -SG      Assistive Device (Bed Mobility)  bed rails  -SG      Recorded by [SG] Sabine Olivo OTR/L 03/24/20 1426      Row Name 03/24/20 1318             Functional Mobility     Functional Mobility- Ind. Level  contact guard assist;verbal cues required  -SG      Functional Mobility-Distance (Feet)  20  -SG      Functional Mobility- Comment  Pt ambulated from bed to commode, then to sink, and back to bed w/ CGA and vc'ing for safety  -SG      Recorded by [SG] Sabine Olivo OTR/L 03/24/20 1426      Row Name 03/24/20 1318             Transfer Assessment/Treatment    Transfer Assessment/Treatment  sit-stand transfer;stand-sit transfer;toilet transfer  -SG      Recorded by [SG] Sabine Olivo OTR/L 03/24/20 1426      Row Name 03/24/20 1318             Sit-Stand Transfer    Sit-Stand [place] (Transfers)  contact guard  -SG      Assistive Device (Sit-Stand Transfers)  -- gait belt  -SG      Recorded by [SG] Sabine Olivo OTR/L 03/24/20 1426      Row Name 03/24/20 1318             Stand-Sit Transfer    Stand-Sit [place] (Transfers)  contact guard;verbal cues  -SG      Assistive Device (Stand-Sit Transfers)  -- gait belt  -SG      Recorded by [SG] Sabine Olivo OTR/L 03/24/20 1426      Row Name 03/24/20 1318             Toilet Transfer    Type (Toilet Transfer)  stand-sit;sit-stand  -SG      [place] Level (Toilet Transfer)  supervision  -SG      Recorded by [SG] Sabine Olivo OTR/L 03/24/20 1426      Row Name 03/24/20 1318             ADL Assessment/Intervention    47411 - OT Self Care/Mgmt Minutes  18  -SG      BADL Assessment/Intervention  toileting;grooming  -SG      Recorded by [SG] Sabine Olivo OTR/L 03/24/20 1426      Row Name 03/24/20 1318             Grooming Assessment/Training    [place] Level (Grooming)  wash face, hands;oral care regimen;supervision  -SG      Grooming Position  supported standing  -SG      Comment (Grooming)  Pt stood at sink to wash hands/face, and to brush his teeth w/ CGA.  Pt encouraged to use right hand to complete grooming tasks to promote improved strength and coordination w/ RUE  -SG      Recorded by [SG] Sabine Olivo OTR/L 03/24/20  1426      Row Name 03/24/20 1318             Toileting Assessment/Training    [place] Level (Toileting)  toileting skills;supervision  -SG      Toileting Position  supported sitting  -SG      Recorded by [SG] Sabine Olivo OTR/L 03/24/20 1426      Row Name 03/24/20 1318             BADL Safety/Performance    Impairments, BADL Safety/Performance  balance;endurance/activity tolerance;cognition;strength  -SG      Cognitive Impairments, BADL Safety/Performance  insight into deficits/self awareness;awareness, need for assistance  -SG      Recorded by [SG] aSbine Olivo OTR/L 03/24/20 1426      Row Name 03/24/20 1318             Motor Skills Assessment/Interventions    Additional Documentation  Motor Coordination Assessment/Training (Group);Balance (Group)  -SG      Recorded by [SG] Sabine Olivo OTR/L 03/24/20 1426      Row Name 03/24/20 1318             Balance    Balance  static sitting balance;static standing balance;dynamic sitting balance;dynamic standing balance  -SG      Recorded by [SG] Sabine Olivo OTR/L 03/24/20 1426      Row Name 03/24/20 1318             Static Sitting Balance    Level of [place] (Unsupported Sitting, Static Balance)  supervision  -SG      Sitting Position (Unsupported Sitting, Static Balance)  sitting on edge of bed  -SG      Time Able to Maintain Position (Unsupported Sitting, Static Balance)  more than 5 minutes  -SG      Recorded by [SG] Sabine Olivo OTR/L 03/24/20 1426      Row Name 03/24/20 1318             Dynamic Sitting Balance    Level of [place], Reaches Outside Midline (Sitting, Dynamic Balance)  supervision  -SG      Sitting Position, Reaches Outside Midline (Sitting, Dynamic Balance)  -- sitting on toilet  -SG      Recorded by [SG] Sabine Olivo OTR/L 03/24/20 1426      Row Name 03/24/20 1318             Static Standing Balance    Level of [place] (Supported Standing, Static Balance)  contact guard assist  -SG      Time Able to Maintain Position  (Supported Standing, Static Balance)  3 to 4 minutes  -SG      Comment (Supported Standing, Static Balance)  w/ CGA stood at sink to complete grooming activities  -SG      Recorded by [SG] Sabine Olivo OTR/L 03/24/20 1426      Row Name 03/24/20 1318             Dynamic Standing Balance    Level of [place], Reaches Outside Midline (Standing, Dynamic Balance)  contact guard assist  -SG      Time Able to Maintain Position, Reaches Outside Midline (Standing, Dynamic Balance)  2 to 3 minutes  -SG      Comment, Reaches Outside Midline (Standing, Dynamic Balance)  transferred from bed, ambulated to toilet, sink, and back to bed w/ CGA and no LOB  -SG      Recorded by [SG] Sabine Olivo OTR/L 03/24/20 1426      Row Name 03/24/20 1318             Motor Coordination Assessment/Training    Comment, Fine Motor Coordination Training  Pt participated in FMC activities including picking up small items on his tray one at a time, keeping in hand, and dropping one at a time w/ RUE. Pt completed stacking/unstacking small cups w/ RUE to promote FMC. Pt instructed to continue doing these activties in room, and once he returns  home (with coins, small objects around house).  -SG      Recorded by [SG] Sabine Olivo OTR/L 03/24/20 1426      Row Name 03/24/20 1318             Positioning and Restraints    Pre-Treatment Position  in bed  -SG      Post Treatment Position  bed  -SG      In Bed  notified nsg;supine;fowlers;call light within reach;encouraged to call for assist;exit alarm on;SCD pump applied  -SG      Recorded by [SG] Sabine Olivo OTR/L 03/24/20 1426      Row Name 03/24/20 1318             Pain Assessment    Additional Documentation  Pain Scale: Numbers Pre/Post-Treatment (Group)  -SG      Recorded by [SG] Sabine Olivo OTR/L 03/24/20 1426      Row Name 03/24/20 1318             Pain Scale: Numbers Pre/Post-Treatment    Pain Scale: Numbers, Pretreatment  5/10  -SG      Pain Scale: Numbers, Post-Treatment  5/10  -SG       Pain Location  head  -SG      Recorded by [SG] Sabine Olivo OTR/L 03/24/20 1426      Row Name 03/24/20 1318             Plan of Care Review    Plan of Care Reviewed With  patient  -SG      Progress  improving  -SG      Recorded by [SG] Sabine Olivo OTR/L 03/24/20 1426        User Key  (r) = Recorded By, (t) = Taken By, (c) = Cosigned By    Initials Name Effective Dates Discipline    SG Sabine Olivo OTR/L 01/20/20 -  OT           Rehab Goal Summary          OT Recommendation and Plan  Outcome Summary/Treatment Plan (OT)  Anticipated Equipment Needs at Discharge (OT): tub bench  Anticipated Discharge Disposition (OT): home with assist, home with home health  Planned Therapy Interventions (OT Eval): activity tolerance training, adaptive equipment training, BADL retraining, functional balance retraining, occupation/activity based interventions, patient/caregiver education/training, ROM/therapeutic exercise, strengthening exercise  Therapy Frequency (OT Eval): daily  Plan of Care Review  Plan of Care Reviewed With: patient  Plan of Care Reviewed With: patient  Outcome Summary: Upon OT arrival, pt up in bed. He was oriented to person, place, and situation. Pt completed bed mobility w/ spv assist. He ambulated from bed to bathroom/toilet, completed toileting tasks w/ spv assist. He stood at sink to complete grooming tasks w/ CGA for safety. Pt ambulated back to bed w/ CGA. There he sat EOB to complete FMC tasks w/ RUE, edu on HEP. Continue with IP OT..  Outcome Measures     Row Name 03/24/20 1318          How much help from another is currently needed...    Putting on and taking off regular lower body clothing?  3  -SG     Bathing (including washing, rinsing, and drying)  3  -SG     Toileting (which includes using toilet bed pan or urinal)  3  -SG     Putting on and taking off regular upper body clothing  3  -SG     Taking care of personal grooming (such as brushing teeth)  4  -SG     Eating meals  4  -SG      AM-PAC 6 Clicks Score (OT)  20  -        Functional Assessment    Outcome Measure Options  AM-PAC 6 Clicks Daily Activity (OT)  -       User Key  (r) = Recorded By, (t) = Taken By, (c) = Cosigned By    Initials Name Provider Type    Sabine Neves OTR/L Occupational Therapist           Time Calculation:   Time Calculation- OT     Row Name 03/24/20 1318             Time Calculation- OT    OT Start Time  1318  -      OT Received On  03/24/20  -      OT Goal Re-Cert Due Date  04/02/20  -         Timed Charges    95835 - OT Therapeutic Activity Minutes  10  -      15262 - OT Self Care/Mgmt Minutes  18  -        User Key  (r) = Recorded By, (t) = Taken By, (c) = Cosigned By    Initials Name Provider Type    Sabine Neves OTR/L Occupational Therapist        Therapy Charges for Today     Code Description Service Date Service Provider Modifiers Qty    91716636385 HC OT THERAPEUTIC ACT EA 15 MIN 3/24/2020 Sabine Olivo OTR/L GO 1    25609980424 HC OT SELF CARE/MGMT/TRAIN EA 15 MIN 3/24/2020 Sabine Olivo OTR/L GO 1               Sabine Olivo OTR/HARDEEP  3/24/2020

## 2020-03-24 NOTE — PROGRESS NOTES
"  [facility] at [facility]   Inpatient Progress Note       LOS: 2 days   Patient Care Team:  Ozzie Castro MD as PCP - General  Ozzie Castro MD as PCP - Family Medicine    Chief Complaint: CVA    Subjective     Interval History:   Patient with ongoing intermittent chest pain which is unable to quantify.  Denies any motor dysfunction.  Status post CATY and stress test both of which were normal.  Agreeable to attempted smoking cessation      Review of Systems:   Pertinent positives noted in history, exam, and assessment. Otherwise reviewed and negative.      Objective     Vitals:  Blood pressure 153/94, pulse 64, temperature 97.8 °F (36.6 °C), temperature source Oral, resp. rate 20, height 185.4 cm (73\"), weight 90.7 kg (200 lb), SpO2 95 %.     Intake/Output Summary (Last 24 hours) at 3/24/2020 1407  Last data filed at 3/24/2020 0339  Gross per 24 hour   Intake 240 ml   Output 1625 ml   Net -1385 ml     Physical Exam   Constitutional: He is oriented to person, place, and time. He appears well-developed and well-nourished.   HENT:   Mouth/Throat: Oropharynx is clear and moist.   Neck: No JVD present. Carotid bruit is not present. No thyromegaly present.   Cardiovascular: Regular rhythm, S1 normal, S2 normal, normal heart sounds and intact distal pulses. Exam reveals no gallop, no S3 and no S4.   No murmur heard.  Pulses:       Carotid pulses are 2+ on the right side, and 2+ on the left side.       Radial pulses are 2+ on the right side, and 2+ on the left side.   Pulmonary/Chest: Breath sounds normal.   Abdominal: Soft. Bowel sounds are normal. He exhibits no mass. There is no tenderness.   Musculoskeletal: He exhibits no edema.   Neurological: He is alert and oriented to person, place, and time.   Skin: Skin is warm and dry. No rash noted.          Results Review:     I reviewed the patient's new clinical results.    Results from last 7 days   Lab Units 03/24/20  0401   WBC 10*3/mm3 8.97 "   HEMOGLOBIN g/dL 15.9   HEMATOCRIT % 46.4   PLATELETS 10*3/mm3 265     Results from last 7 days   Lab Units 03/24/20  0401  03/22/20  1137   SODIUM mmol/L 138   < > 138   POTASSIUM mmol/L 4.2   < > 4.7   CHLORIDE mmol/L 104   < > 102   CO2 mmol/L 21.0*   < > 23.0   BUN mg/dL 18   < > 17   CREATININE mg/dL 1.68*   < > 1.58*   CALCIUM mg/dL 9.2   < > 9.7   ALT (SGPT) U/L  --   --  27   AST (SGOT) U/L  --   --  27   GLUCOSE mg/dL 88   < > 97    < > = values in this interval not displayed.     Results from last 7 days   Lab Units 03/24/20  0401   SODIUM mmol/L 138   POTASSIUM mmol/L 4.2   CHLORIDE mmol/L 104   CO2 mmol/L 21.0*   BUN mg/dL 18   CREATININE mg/dL 1.68*   GLUCOSE mg/dL 88   CALCIUM mg/dL 9.2     Results from last 7 days   Lab Units 03/22/20  1119   INR  1.1     No results found for: TROPONINI  Results from last 7 days   Lab Units 03/23/20  0451 03/22/20  1137   TSH uIU/mL 1.420  --    FREE T4 ng/dL  --  1.49     Results from last 7 days   Lab Units 03/23/20  0451   CHOLESTEROL mg/dL 157   TRIGLYCERIDES mg/dL 228*   HDL CHOL mg/dL 28*   LDL CHOL mg/dL 83             Tele: Sinus rhythm    Assessment/Plan       Acute ischemic stroke (CMS/[facility])    Chronic pain    Depression    Dyslipidemia    GERD (gastroesophageal reflux disease)    Hypertension    Neuropathy    Cerebrovascular atherosclerosis seen on imaging    Tobacco use    CKD (chronic kidney disease) stage 3, GFR 30-59 ml/min (CMS/[facility])    Elevated troponin          Plan:  1.  Chest pain and dyspnea: Chronic findings.  Normal stress perfusion today.  No signs of ongoing ischemia, and I suspect this is noncardiac in origin.  2.  CVA, felt embolic by neurology.  Normal CATY without any source of embolism today.  No evidence of shunting.  Normal valvular structures.  No plaque seen in aorta.  3.  Hypertension controlled  4.  Dyslipidemia started on statin  5.  Tobacco abuse.    At this time, no cardiac recommendations and will sign off.  From a cardiovascular  standpoint, antiplatelet therapy would be appropriate for future event prophylaxis.  Smoking cessation will be paramount.  Patient understands this.  Please call if any further questions.    Sage Mcgee MD    Dictated utilizing Dragon dictation

## 2020-03-24 NOTE — THERAPY EVALUATION
"Acute Care - Speech Language Pathology Initial Evaluation  [facility]   Cognitive-Communication Evaluation       Patient Name: Lux Mckoy  : 1970  MRN: [id number]  Today's Date: 3/24/2020  Onset of Illness/Injury or Date of Surgery: 20            Admit Date: 3/22/2020     Visit Dx:    ICD-10-CM ICD-9-CM   1. Acute CVA (cerebrovascular accident) (CMS/[facility]) I63.9 434.91   2. Decreased activities of daily living (ADL) Z78.9 V49.89   3. Cognitive communication deficit R41.841 799.52   4. Aphasia R47.01 784.3     Patient Active Problem List   Diagnosis   • Allergic rhinitis   • Salvador esophagus   • Chronic pain   • Chronic prostatitis   • Depression   • Dyslipidemia   • GERD (gastroesophageal reflux disease)   • Hypertension   • Hypogonadism in male   • Neuropathy with IgA monoclonal gammopathy (CMS/[facility])   • Migraine   • Neuropathy   • Pulmonary nodule   • Vertigo   • Tinea versicolor   • Acute frontal sinusitis   • Acute ischemic stroke (CMS/[facility])   • Cerebrovascular atherosclerosis seen on imaging   • Tobacco use   • CKD (chronic kidney disease) stage 3, GFR 30-59 ml/min (CMS/[facility])   • Elevated troponin     Past Medical History:   Diagnosis Date   • Dupuytren contracture     s/p bilateral hand surgery   • Dyspnea    • Kidney infection    • Pancreatitis      Past Surgical History:   Procedure Laterality Date   • CERVICAL DISCECTOMY ANTERIOR  2017    C6-7 at [place] in [place]   • HAND SURGERY     • LUMBAR DISCECTOMY      \"Spinal\" times 2 in  and    • UMBILICAL HERNIA REPAIR      Dr. Diego Vasquez        SLP EVALUATION (last 72 hours)      SLP SLC Evaluation     Row Name 20 1250                   Communication Assessment/Intervention    Document Type  evaluation  -        Subjective Information  no complaints  -        Patient Observations  alert;cooperative;agree to therapy  -        Patient/Family Observations  Wife on phone to assist patient in answering questions  -     "    Patient Effort  good  -           General Information    Patient Profile Reviewed  yes  -CH        Pertinent History Of Current Problem  49 y.o. male with presents the hospital with word finding and speaking difficulties and right-sided numbness and was found on imaging to have acute ischemic stroke. SLC evaluation completed per stroke protocol.   -CH        Precautions/Limitations, Vision  WFL;for purposes of eval  -CH        Precautions/Limitations, Hearing  WFL;for purposes of eval  -CH        Prior Level of Function-Communication  WFL  -        Plans/Goals Discussed with  patient;spouse/S.O.;agreed upon  -        Barriers to Rehab  none identified  -        Patient's Goals for Discharge  return to home  -        Family Goals for Discharge  patient able to return to previous activities/roles  -           Pain Assessment    Additional Documentation  Pain Scale: FACES Pre/Post-Treatment (Group)  -           Pain Scale: Numbers Pre/Post-Treatment    Pain Scale: Numbers, Pretreatment  0/10 - no pain  -        Pain Scale: Numbers, Post-Treatment  0/10 - no pain  -           Pain Scale: FACES Pre/Post-Treatment    Pain: FACES Scale, Pretreatment  0-->no hurt  -        Pain: FACES Scale, Post-Treatment  0-->no hurt  -           Comprehension Assessment/Intervention    Comprehension Assessment/Intervention  Auditory Comprehension;Reading Comprehension  -           Auditory Comprehension Assessment/Intervention    Auditory Comprehension (Communication)  moderate impairment  -        Able to Identify Objects/Pictures (Communication)  familiar objects;WNL  -        Answers Questions (Communication)  yes/no;WFL;wh questions;personal;simple;concrete;mild impairment  -        Able to Follow Commands (Communication)  1-step;WFL;2-step;3-step;moderate impairment  -        Narrative Discourse  conversational level;moderate impairment  -        Successful Auditory Strategies (Communication)   repetition;visual cues;decrease environmental distractions  -CH           Reading Comprehension Assessment/Intervention    Reading Comprehension (Communication)  mild impairment  -CH        Scanning (Reading)  WFL  -CH        Phrase Level  WFL  -CH        Paragraph Level  moderate impairment  -CH        Functional Reading Tasks  moderate impairment  -CH           Expression Assessment/Intervention    Expression Assessment/Intervention  verbal expression  -CH           Verbal Expression Assessment/Intervention    Verbal Expression  moderate impairment  -CH        Automatic Speech (Communication)  response to greeting;counting 1-20;WFL  -CH        Repetition  sentences;moderate impairment  -CH        Phrase Completion  unpredictable;moderate impairment  -CH        Responsive Naming  mild impairment  -CH        Confrontational Naming  high frequency;WFL  -CH        Sentence Formulation  complex;mild impairment  -CH        Conversational Discourse/Fluency  moderate impairment  -CH           Oral Motor Structure and Function    Oral Motor Structure and Function  WNL  -CH        Dentition Assessment  natural, present and adequate  -        Mucosal Quality  moist, healthy  -           Oral Musculature and Cranial Nerve Assessment    Oral Motor General Assessment  WFL  -CH           Motor Speech Assessment/Intervention    Motor Speech Function  WNL  -CH           Cursory Voice Assessment/Intervention    Quality and Resonance (Voice)  WNL  -CH           Cognitive Assessment Intervention- SLP    Cognitive Function (Cognition)  mild impairment  -CH        Orientation Status (Cognition)  mild impairment  -CH        Memory (Cognitive)  simple;immediate;short-term;delayed;moderate impairment  -CH        Attention (Cognitive)  selective;sustained;attention to detail;moderate impairment  -CH        Thought Organization (Cognitive)  concrete divergent;abstract divergent;concrete convergent;abstract convergent;verbal  sequencing;moderate impairment  -        Reasoning (Cognitive)  simple;moderate impairment  -        Problem Solving (Cognitive)  simple;moderate impairment  -           SLP Clinical Impressions    SLP Diagnosis  Patient presents with a moderate cognitive linguistic impairment as evidenced by difficulty with word finding, comprehension, recall, reasoning and problem solving, orientation, sequencing and attention to task.  -        Rehab Potential/Prognosis  good  -        SLC Criteria for Skilled Therapy Interventions Met  yes  -        Functional Impact  functional impact in social situations;functional impact in ADLs;difficulty communicating wants, needs;difficulty communicating in an emergency;difficulty in expressing complex messages;restrictions in personal and social life;difficulty completing home management task  -           Recommendations    Therapy Frequency (SLP SLC)  5 days per week  -        Predicted Duration Therapy Intervention (Days)  until discharge  -        Anticipated Dischage Disposition  unknown;anticipate therapy at next level of care  -           SLP Discharge Summary    Discharge Destination  unknown;anticipated therapy at next level of care  -          User Key  (r) = Recorded By, (t) = Taken By, (c) = Cosigned By    Initials Name Effective Dates     Trina Harris MS CCC-SLP 02/14/19 -              EDUCATION  The patient has been educated in the following areas:     Cognitive Impairment Communication Impairment.    SLP Recommendation and Plan  SLP Diagnosis: Patient presents with a moderate cognitive linguistic impairment as evidenced by difficulty with word finding, comprehension, recall, reasoning and problem solving, orientation, sequencing and attention to task.     SLC Criteria for Skilled Therapy Interventions Met: yes  Anticipated Dischage Disposition: unknown, anticipate therapy at next level of care     Predicted Duration Therapy Intervention (Days):  until discharge           [facility] GOALS     Row Name 03/24/20 1300             Comprehend Questions Goal 1 (SLP)    Improve Ability to Comprehend Questions Goal 1 (SLP)  simple wh questions;complex wh questions;multi-unit questions;abstract questions;80%;with minimal cues (75-90%)  -      Time Frame (Comprehend Questions Goal 1, SLP)  by discharge  -         Follow Directions Goal 2 (SLP)    Improve Ability to Follow Directions Goal 1 (SLP)  2 step commands;3 step commands;90%;with minimal cues (75-90%)  -      Time Frame (Follow Directions Goal 1, [facility])  by discharge  -         Comprehension at Paragraph Level Goal 1 (SLP)    Improve Reading Comprehension at Paragraph Level Goal 1 (SLP)  answer questions re: paragraph read;simple;80%;with minimal cues (75-90%)  -      Time Frame (Reading Comprehension at Paragraph Level Goal 1, SLP)  by discharge  -         Word Retrieval Skills Goal 1 (SLP)    Improve Word Retrieval Skills By Goal 1 (SLP)  responsive naming task;completing functional word finding tasks;completing a divergent task;80%;with minimal cues (75-90%)  -      Time Frame (Word Retrieval Goal 1, SLP)  by discharge  -         Attention Goal 1 (SLP)    Improve Attention by Goal 1 (SLP)  complete sustained attention task;80%;with minimal cues (75-90%)  -      Time Frame (Attention Goal 1, SLP)  by discharge  -         Orientation Goal 1 ([facility])    Improve Orientation Through Goal 1 ([facility])  demonstrating orientation to day;demonstrating orientation to month;demonstrating orientation to year;demonstrating orientation to place;demonstrating orientation to disease/impairment;use environmental aids to assist with orientation;80%;with minimal cues (75-90%)  -      Time Frame (Orientation Goal 1, SLP)  by discharge  -         Memory Skills Goal 1 (SLP)    Improve Memory Skills Through Goal 1 (SLP)  recalling related word lists immediately;recalling unrelated word lists immediately;recall details from  a word list;use memory strategies;80%;with minimal cues (75-90%)  -      Time Frame (Memory Skills Goal 1, SLP)  by discharge  -         Organizational Skills Goal 1 (SLP)    Improve Thought Organization Through Goal 1 (SLP)  completing a divergent naming task;generating a list of items in a category;completing a verbal sequencing task;80%;with minimal cues (75-90%)  -      Time Frame (Thought Organization Skills Goal 1, SLP)  by discharge  -         Functional Problem Solving Skills Goal 1 (SLP)    Improve Problem Solving Through Goal 1 (SLP)  answer questions about ADL problems;determine solutions to simple ADL/safety problems;name items for a task;90%;with minimal cues (75-90%)  -      Time Frame (Problem Solving Goal 1, SLP)  by discharge  -         Additional Goal 1 (SLP)    Additional Goal 1, SLP  LTG: Patient will improve language and cognitive skills to [facility] in order to better participate in his own care and rehab.   -      Time Frame (Additional Goal 1, SLP)  by discharge  -        User Key  (r) = Recorded By, (t) = Taken By, (c) = Cosigned By    Initials Name Provider Type    Trina Dominguez MS CCC-SLP Speech and Language Pathologist                  Time Calculation:     Time Calculation- SLP     Row Name 03/24/20 1335             Time Calculation- SLP    SLP Start Time  1250  -      SLP Received On  03/24/20  -        User Key  (r) = Recorded By, (t) = Taken By, (c) = Cosigned By    Initials Name Provider Type    Trina Dominguez MS CCC-SLP Speech and Language Pathologist          Therapy Charges for Today     Code Description Service Date Service Provider Modifiers Qty    [id number] HC ST EVAL SPEECH AND PROD W LANG  4 3/24/2020 Trina Harris MS CCC-SLP GN 1                     Trina Harris MS CCC-SLP  3/24/2020

## 2020-03-24 NOTE — PLAN OF CARE
Problem: Patient Care Overview  Goal: Plan of Care Review  Outcome: Ongoing (interventions implemented as appropriate)  Flowsheets (Taken 3/24/2020 1100 by Samreen Jones RN)  Plan of Care Reviewed With: spouse;patient (via telephone w/ pt in room)  Note:   SLP evaluation completed. Will continue to address cognition and communication in treatment. Please see note for further details and recommendations.

## 2020-03-24 NOTE — PLAN OF CARE
"Alert and oriented X4, VSS, NIH 2 for sensation and dysarthria, he states, \"I dont care for that.\" often referring to anything he does not  Problem: Patient Care Overview  Goal: Plan of Care Review  Outcome: Ongoing (interventions implemented as appropriate)  Goal: Individualization and Mutuality  Outcome: Ongoing (interventions implemented as appropriate)  Goal: Discharge Needs Assessment  Outcome: Ongoing (interventions implemented as appropriate)  Goal: Interprofessional Rounds/Family Conf  Outcome: Ongoing (interventions implemented as appropriate)     Problem: Pain, Chronic (Adult)  Goal: Identify Related Risk Factors and Signs and Symptoms  Outcome: Ongoing (interventions implemented as appropriate)  Goal: Acceptable Pain/Comfort Level and Functional Ability  Outcome: Ongoing (interventions implemented as appropriate)     Problem: Stroke (Ischemic) (Adult)  Description  Prevent and manage potential problems includin. acute neurologic deterioration  2. bladder/bowel dysfunction  3. cognitive impairment  4. communication impairment  5. eating/swallowing impairment  6. fever  7. hemodynamic instability  8. motor/sensory impairment  9. muscle tone abnormal  10. respiratory compromise  11. situational response  12. skin breakdown  13. VTE (venous thromboembolism)  Goal: Signs and Symptoms of Listed Potential Problems Will be Absent, Minimized or Managed (Stroke)  Outcome: Ongoing (interventions implemented as appropriate)    like, seems anxious when talking about returning home, he states he cant do anything, encouraged and educated patient it would take a while to adjust, as he has no overt physical deficits he may be able to eventually return to many previous activities. Notable slow processing, of directions, sleeping well  "

## 2020-03-24 NOTE — PROGRESS NOTES
"Neurology       Patient Care Team:  Ozzie Castro MD as PCP - General  Ozzie Castro MD as PCP - Family Medicine    Chief complaint: confusion and difficulty speaking      Subjective .     History:  Sitting up in bed. States that he would like to go home and is hungry. He feels like his speech is improving. No adverse events overnight. NAD noted.     ROS:   Denies pain, denies headache    Objective     Vital Signs   Blood pressure 128/91, pulse 66, temperature 98.6 °F (37 °C), temperature source Oral, resp. rate 18, height 185.4 cm (73\"), weight 90.7 kg (200 lb), SpO2 94 %.    Physical Exam:   General: Middle aged male in NAD   Resp:even and unlabored   Neuro: Alert and oriented x4, mild to moderate aphasia, mild right sided sensory loss, minor right facial droop,  MAEW     Results Review:  LDL- 83  HgbA1C- 5.40  Cr. 1.55  Folate 6.59  UDS +oxycodone    Ct Angiogram Head    Result Date: 3/22/2020  No large vessel occlusion including only minimal atherosclerotic involvement of the carotid bifurcations, however within the MCA distributions bilaterally, there are mild to moderate irregularities concerning for atherosclerotic involvement without distinct occlusion left greater than right of the M3 and M4 segments in particular.  DICTATED:   03/22/2020 EDITED/ls :   03/22/2020   This report was finalized on 3/22/2020 12:27 PM by Dr. Kenneth Durand.      Ct Angiogram Neck    Result Date: 3/22/2020  No large vessel occlusion including only minimal atherosclerotic involvement of the carotid bifurcations, however within the MCA distributions bilaterally, there are mild to moderate irregularities concerning for atherosclerotic involvement without distinct occlusion left greater than right of the M3 and M4 segments in particular.  DICTATED:   03/22/2020 EDITED/ls :   03/22/2020   This report was finalized on 3/22/2020 12:27 PM by Dr. Kenneth Durand.      Mri Brain Without Contrast    Result Date: 3/22/2020  Abnormal " restriction throughout the bilateral cerebral hemispheres. Multifocal appearance greatest region of fairly confluent restriction in the left temporoparietal region as seen on perfusion and prior imaging same day consistent with acute multifocal infarctions of concern for embolic etiology given bilateral and multifocal distribution. No midline shift or hydrocephalus.  DICTATED:   03/22/2020 EDITED/ls :   03/22/2020    This report was finalized on 3/22/2020 6:56 PM by Dr. Kenneth Durand.      Xr Chest 1 View    Result Date: 3/22/2020  Borderline cardiomegaly with trace central vascular congestion, however no overt edema or consolidation of acute parenchymal process. No pleural effusion.  DICTATED:   03/22/2020 EDITED/ls :   03/22/2020  This report was finalized on 3/22/2020 6:55 PM by Dr. Kenneth Durand.      Ct Head Without Contrast Stroke Protocol    Result Date: 3/22/2020  Abnormal low-attenuation region left frontoparietal posterior left MCA distribution involvement extending to involve the cortex with sulcal effacement concerning for acute or evolving infarction without intracranial hemorrhage.  Scan performed on 03/22/2020 at 1106 hours. Scan report given to ER physician and team and made available on 03/22/2020 at 1112 hours.  DICTATED:   03/22/2020 EDITED/ls :   03/22/2020   This report was finalized on 3/22/2020 12:27 PM by Dr. Kenneth Durand.      Ct Cerebral Perfusion With & Without Contrast    Result Date: 3/22/2020  Reversible ischemia within the left posterior MCA distribution without evidence for core infarct component.  DICTATED:   03/22/2020 EDITED/ls :   03/22/2020  This report was finalized on 3/22/2020 12:27 PM by Dr. Kenneth Durand.      Assessment/Plan     Acute left posterior parietal stroke, MCA territory  - ? cardioembolic source; ? PAF   - MRI brain-multifocal infarctions concerning for embolic etiology.    - Cardiology: CATY and stress test completed today.  It did not reveal any abnormalities.  -  Anticoagulation recs per Cardiology  - Continue ASA, Plavix, and Statin  - ST,PT, OT  - Neurology will sign off. Please call with any questions or concerns.  Follow-up with Dr. Rodriguez in outpatient neurology clinic.    I discussed the patients findings and my recommendations with patient and family    ALDO Peter, AGACNP-BC  03/24/20  09:33

## 2020-03-24 NOTE — PLAN OF CARE
Problem: Patient Care Overview  Goal: Plan of Care Review  Outcome: Ongoing (interventions implemented as appropriate)  Flowsheets (Taken 3/24/2020 2591)  Progress: improving  Plan of Care Reviewed With: patient  Outcome Summary: NIHSS=0. VSS. Wife updated. Possible d/c to [facility] for rehab when medically ready.  Goal: Individualization and Mutuality  Outcome: Ongoing (interventions implemented as appropriate)  Goal: Discharge Needs Assessment  Outcome: Ongoing (interventions implemented as appropriate)  Goal: Interprofessional Rounds/Family Conf  Outcome: Ongoing (interventions implemented as appropriate)     Problem: Pain, Chronic (Adult)  Goal: Identify Related Risk Factors and Signs and Symptoms  Outcome: Ongoing (interventions implemented as appropriate)  Goal: Acceptable Pain/Comfort Level and Functional Ability  Outcome: Ongoing (interventions implemented as appropriate)     Problem: Stroke (Ischemic) (Adult)  Goal: Signs and Symptoms of Listed Potential Problems Will be Absent, Minimized or Managed (Stroke)  Outcome: Ongoing (interventions implemented as appropriate)

## 2020-03-24 NOTE — PROGRESS NOTES
[facility]  PROGRESS NOTE    Patient Name: Lux Mckoy  : 1970  MRN: [id number]    Date of Admission: 3/22/2020  Primary Care Physician: Ozzie Castro MD    Subjective   Subjective     CC:  Follow-up ischemic stroke and ataxic gait    HPI:  Patient continues to state his speech is improved.  He says he still a little forgetful.  Numbness improved.  Mild headache improved.  States he is a little nervous about his stress test and CATY today.  Denies any other new complaints or symptoms.       Review of Systems  No current fevers or chills  No current shortness of breath or cough  No current nausea, vomiting, or diarrhea  No current chest pain or palpitations    Objective   Objective     Vital Signs:   Temp:  [97.4 °F (36.3 °C)-98.6 °F (37 °C)] 98.6 °F (37 °C)  Heart Rate:  [61-89] 66  Resp:  [18] 18  BP: (111-144)/() 128/91  Total (NIH Stroke Scale): 2     Physical Exam:  Constitutional:Awake, alert  HENT: NCAT, mucous membranes moist, neck supple  Respiratory: Clear to auscultation bilaterally, respiratory effort normal, nonlabored breathing   Cardiovascular: RRR, normal radial pulses  Gastrointestinal: Positive bowel sounds, soft, nontender, nondistended  Musculoskeletal: Normal musculature for age, no lower extremity edema, BMI 26  Psychiatric: Appropriate affect, cooperative, conversational  Neurologic: Speech mostly clear with much less aphasia, follows commands well, somewhat poor historian but oriented  Skin: No rashes or jaundice, warm      Results Reviewed:  Results from last 7 days   Lab Units 20  0401 20  0451 20  1137  20  1119   WBC 10*3/mm3 8.97 8.70 10.17  --   --    HEMOGLOBIN g/dL 15.9 15.8 16.3  --   --    HEMOGLOBIN, POC   --   --   --    < >  --    HEMATOCRIT % 46.4 46.9 48.6  --   --    HEMATOCRIT POC   --   --   --    < >  --    PLATELETS 10*3/mm3 265 241 261  --   --    INR   --   --   --   --  1.1    < >  = values in this interval not displayed.     Results from last 7 days   Lab Units 03/24/20  0401 03/23/20  0451 03/22/20  1137   SODIUM mmol/L 138 131* 138   POTASSIUM mmol/L 4.2 4.1 4.7   CHLORIDE mmol/L 104 99 102   CO2 mmol/L 21.0* 20.0* 23.0   BUN mg/dL 18 18 17   CREATININE mg/dL 1.68* 1.55* 1.58*   GLUCOSE mg/dL 88 94 97   CALCIUM mg/dL 9.2 9.0 9.7   ALT (SGPT) U/L  --   --  27   AST (SGOT) U/L  --   --  27   TROPONIN T ng/mL  --  0.033* 0.070*     Estimated Creatinine Clearance: 69 mL/min (A) (by C-G formula based on SCr of 1.68 mg/dL (H)).    Microbiology Results Abnormal     None          Imaging Results (Last 24 Hours)     ** No results found for the last 24 hours. **          Results for orders placed during the hospital encounter of 03/22/20   Adult Transthoracic Echo Complete W/ Cont if Necessary Per Protocol (With Agitated Saline)    Narrative · Estimated EF = 60%.  · Left ventricular systolic function is normal.          I have reviewed the medications:  Scheduled Meds:    aspirin 325 mg Oral Daily   Or      aspirin 300 mg Rectal Daily   atorvastatin 80 mg Oral Nightly   azelastine 2 spray Each Nare Daily   clopidogrel 75 mg Oral Daily   dicyclomine 10 mg Oral Daily With Breakfast, Lunch & Dinner   escitalopram 10 mg Oral Daily   famotidine 20 mg Oral Nightly   folic acid 1 mg Oral Daily   heparin (porcine) 5,000 Units Subcutaneous Q8H   pantoprazole 40 mg Oral Q AM   sodium chloride 10 mL Intravenous Q12H   sodium chloride 10 mL Intravenous Q12H     Continuous Infusions:   PRN Meds:.•  acetaminophen **OR** acetaminophen **OR** acetaminophen  •  cetirizine  •  docusate sodium  •  famotidine  •  melatonin  •  ondansetron **OR** ondansetron  •  oxyCODONE-acetaminophen  •  sodium chloride  •  sodium chloride  •  sodium chloride    Assessment/Plan   Assessment & Plan     Active Hospital Problems    Diagnosis  POA   • **Acute ischemic stroke (CMS/HCC) [I63.9]  Yes   • Cerebrovascular atherosclerosis seen  on imaging [I70.90]  Yes   • Tobacco use [Z72.0]  Yes   • CKD (chronic kidney disease) stage 3, GFR 30-59 ml/min (CMS/HCC) [N18.3]  Yes   • Chronic pain [G89.29]  Yes   • Depression [F32.9]  Yes   • Dyslipidemia [E78.5]  Yes   • GERD (gastroesophageal reflux disease) [K21.9]  Yes   • Neuropathy [G62.9]  Yes   • Hypertension [I10]  Yes      Resolved Hospital Problems   No resolved problems to display.        Brief Hospital Course to date:  Lux Mckoy is a 49 y.o. male with presents the hospital with word finding and speaking difficulties and right-sided numbness and was found on imaging to have acute ischemic stroke.    Discussion/plan: Echocardiogram results reviewed.  Negative bubble and normal EF.  Plan is for CATY with cardiology today to further assess for possible embolic sources of stroke.  Plavix added to aspirin for further stroke prevention while awaiting results.  Elevated troponin noted, cardiology recommend stress test which is also scheduled for today.  Continue to hold lisinopril to allow permissive hypertension.  Continue PT OT and speech therapy.  Continue decreased dose of gabapentin, neuropathic pain reasonably controlled.  Mood slightly improved with Lexapro, continue, patient and family in agreement.  No side effects so far.  Repeat laboratory studies tomorrow.  Renal function reviewed today and remains at baseline renal function.     Acute ischemic stroke, cerebrovascular atherosclerosis seen on imaging:  Management per stroke protocol.  Aspirin, statin, neurology team following, MRI positive for bilateral cerebellar strokes left greater than right, no significant carotid artery stenosis with imaging, continue neuro checks, telemetry monitoring.  Speech therapy, PT, OT.     Tobacco use: Cessation counseled.  Reportedly only smoking a few cigarettes a day.    Agrees to quit.     Essential hypertension: Hold lisinopril to allow for permissive hypertension following ischemic stroke.     Stage  III chronic kidney disease: BMP currently pending.  Plan to monitor renal function while hospitalized.  Baseline creatinine ranges from 1.5-1.7.  Remains at baseline.     Chronic pain, neuropathy: Decrease gabapentin dose for now.  Monitor mental status.  Decrease Percocet dose.  Careful monitoring and supportive care.     Hyperlipidemia with hypertriglyceridemia: Triglycerides 228, LDL 83 with preferred goal less than 70.  Atorvastatin.      Gastroesophageal reflux disease: H2 blocker.  Stable without current symptoms     Depression: Wife reports patient has been off his medication.    Now started low-dose Lexapro for now as SSRIs can be beneficial in stroke recovery.  Patient tolerating it well, symptoms stable.    Chest pain and minimally elevated troponin: No ischemic changes on reviewed EKG.  Reports remote negative stress test.  Repeat stress test this hospitalization       DVT Prophylaxis: Subcutaneous heparin    Disposition: I expect the patient to be discharged possibly inpatient rehabilitation when work-up completed    CODE STATUS:   Code Status and Medical Interventions:   Ordered at: 03/22/20 1208     Level Of Support Discussed With:    Patient     Code Status:    CPR     Medical Interventions (Level of Support Prior to Arrest):    Full         Electronically signed by Joe Rasmussen MD, 03/24/20, 08:21.

## 2020-03-24 NOTE — PLAN OF CARE
Problem: Patient Care Overview  Goal: Plan of Care Review  Outcome: Ongoing (interventions implemented as appropriate)  Flowsheets (Taken 3/24/2020 1423)  Progress: improving  Plan of Care Reviewed With: patient  Outcome Summary: Pt increased ambulation distance to 200ft with CGA. Pt declined any AD or HHA. Pt with periods of unsteady gait; unable to correct despite cueing. Pt denied any c/o pain. Continue to progress as appropriate.

## 2020-03-25 ENCOUNTER — TELEPHONE (OUTPATIENT)
Dept: NEUROLOGY | Facility: CLINIC | Age: 50
End: 2020-03-25

## 2020-03-25 VITALS
BODY MASS INDEX: 26.51 KG/M2 | DIASTOLIC BLOOD PRESSURE: 104 MMHG | HEIGHT: 73 IN | HEART RATE: 61 BPM | OXYGEN SATURATION: 96 % | RESPIRATION RATE: 18 BRPM | TEMPERATURE: 98.3 F | SYSTOLIC BLOOD PRESSURE: 117 MMHG | WEIGHT: 200 LBS

## 2020-03-25 PROBLEM — R47.01 EXPRESSIVE APHASIA: Status: ACTIVE | Noted: 2020-03-25

## 2020-03-25 LAB
ANION GAP SERPL CALCULATED.3IONS-SCNC: 15 MMOL/L (ref 5–15)
BUN BLD-MCNC: 22 MG/DL (ref 6–20)
BUN/CREAT SERPL: 13.6 (ref 7–25)
CALCIUM SPEC-SCNC: 8.9 MG/DL (ref 8.6–10.5)
CHLORIDE SERPL-SCNC: 104 MMOL/L (ref 98–107)
CO2 SERPL-SCNC: 21 MMOL/L (ref 22–29)
CREAT BLD-MCNC: 1.62 MG/DL (ref 0.76–1.27)
GFR SERPL CREATININE-BSD FRML MDRD: 46 ML/MIN/1.73
GLUCOSE BLD-MCNC: 100 MG/DL (ref 65–99)
POTASSIUM BLD-SCNC: 4 MMOL/L (ref 3.5–5.2)
SODIUM BLD-SCNC: 140 MMOL/L (ref 136–145)

## 2020-03-25 PROCEDURE — 97116 GAIT TRAINING THERAPY: CPT

## 2020-03-25 PROCEDURE — 97530 THERAPEUTIC ACTIVITIES: CPT

## 2020-03-25 PROCEDURE — 92507 TX SP LANG VOICE COMM INDIV: CPT

## 2020-03-25 PROCEDURE — 97535 SELF CARE MNGMENT TRAINING: CPT

## 2020-03-25 PROCEDURE — 92523 SPEECH SOUND LANG COMPREHEN: CPT

## 2020-03-25 PROCEDURE — 97110 THERAPEUTIC EXERCISES: CPT

## 2020-03-25 PROCEDURE — 25010000002 HEPARIN (PORCINE) PER 1000 UNITS: Performed by: INTERNAL MEDICINE

## 2020-03-25 PROCEDURE — 99239 HOSP IP/OBS DSCHRG MGMT >30: CPT | Performed by: INTERNAL MEDICINE

## 2020-03-25 PROCEDURE — 80048 BASIC METABOLIC PNL TOTAL CA: CPT | Performed by: INTERNAL MEDICINE

## 2020-03-25 RX ORDER — OXYCODONE HYDROCHLORIDE AND ACETAMINOPHEN 5; 325 MG/1; MG/1
1 TABLET ORAL EVERY 6 HOURS PRN
Start: 2020-03-25 | End: 2020-04-01

## 2020-03-25 RX ORDER — GABAPENTIN 600 MG/1
300 TABLET ORAL NIGHTLY
Qty: 60 TABLET | Refills: 1
Start: 2020-03-25 | End: 2020-10-29

## 2020-03-25 RX ORDER — FAMOTIDINE 20 MG/1
20 TABLET, FILM COATED ORAL NIGHTLY
Start: 2020-03-25 | End: 2022-08-23

## 2020-03-25 RX ORDER — ACETAMINOPHEN 325 MG/1
650 TABLET ORAL EVERY 4 HOURS PRN
Start: 2020-03-25

## 2020-03-25 RX ORDER — ATORVASTATIN CALCIUM 80 MG/1
80 TABLET, FILM COATED ORAL NIGHTLY
Start: 2020-03-25 | End: 2020-08-20

## 2020-03-25 RX ORDER — FOLIC ACID 1 MG/1
1 TABLET ORAL DAILY
Start: 2020-03-26

## 2020-03-25 RX ORDER — FAMOTIDINE 20 MG/1
20 TABLET, FILM COATED ORAL 2 TIMES DAILY PRN
Start: 2020-03-25 | End: 2020-07-31 | Stop reason: HOSPADM

## 2020-03-25 RX ORDER — ESCITALOPRAM OXALATE 10 MG/1
10 TABLET ORAL DAILY
Start: 2020-03-26 | End: 2020-05-21

## 2020-03-25 RX ORDER — PSEUDOEPHEDRINE HCL 30 MG
100 TABLET ORAL 2 TIMES DAILY PRN
Start: 2020-03-25 | End: 2021-05-11

## 2020-03-25 RX ORDER — CLOPIDOGREL BISULFATE 75 MG/1
75 TABLET ORAL DAILY
Qty: 30 TABLET
Start: 2020-03-26 | End: 2020-07-31 | Stop reason: HOSPADM

## 2020-03-25 RX ADMIN — OXYCODONE HYDROCHLORIDE AND ACETAMINOPHEN 1 TABLET: 5; 325 TABLET ORAL at 05:08

## 2020-03-25 RX ADMIN — HEPARIN SODIUM 5000 UNITS: 5000 INJECTION, SOLUTION INTRAVENOUS; SUBCUTANEOUS at 05:08

## 2020-03-25 RX ADMIN — CLOPIDOGREL BISULFATE 75 MG: 75 TABLET ORAL at 08:13

## 2020-03-25 RX ADMIN — DICYCLOMINE HYDROCHLORIDE 10 MG: 20 TABLET ORAL at 12:22

## 2020-03-25 RX ADMIN — ESCITALOPRAM OXALATE 10 MG: 10 TABLET ORAL at 08:12

## 2020-03-25 RX ADMIN — ACETAMINOPHEN 650 MG: 325 TABLET, FILM COATED ORAL at 08:12

## 2020-03-25 RX ADMIN — SODIUM CHLORIDE, PRESERVATIVE FREE 10 ML: 5 INJECTION INTRAVENOUS at 08:13

## 2020-03-25 RX ADMIN — FOLIC ACID 1 MG: 1 TABLET ORAL at 08:12

## 2020-03-25 RX ADMIN — PANTOPRAZOLE SODIUM 40 MG: 40 TABLET, DELAYED RELEASE ORAL at 05:08

## 2020-03-25 RX ADMIN — OXYCODONE HYDROCHLORIDE AND ACETAMINOPHEN 1 TABLET: 5; 325 TABLET ORAL at 11:40

## 2020-03-25 RX ADMIN — DICYCLOMINE HYDROCHLORIDE 10 MG: 20 TABLET ORAL at 08:13

## 2020-03-25 RX ADMIN — ASPIRIN 325 MG ORAL TABLET 325 MG: 325 PILL ORAL at 08:12

## 2020-03-25 RX ADMIN — HEPARIN SODIUM 5000 UNITS: 5000 INJECTION, SOLUTION INTRAVENOUS; SUBCUTANEOUS at 14:30

## 2020-03-25 NOTE — THERAPY TREATMENT NOTE
Acute Care - Speech Language Pathology Treatment Note  [facility]     Patient Name: Lux Mckoy  : 1970  MRN: [id number]  Today's Date: 3/25/2020         Admit Date: 3/22/2020    Visit Dx:      ICD-10-CM ICD-9-CM   1. Acute CVA (cerebrovascular accident) (CMS/HCC) I63.9 434.91   2. Decreased activities of daily living (ADL) Z78.9 V49.89   3. Cognitive communication deficit R41.841 799.52   4. Aphasia R47.01 784.3   5. Seasonal allergic rhinitis due to pollen J30.1 477.0   6. Other chronic pain G89.29 338.29   7. Neuropathy G62.9 355.9     Patient Active Problem List   Diagnosis   • Allergic rhinitis   • [place] esophagus   • Chronic pain   • Chronic prostatitis   • Depression   • Dyslipidemia   • GERD (gastroesophageal reflux disease)   • Hypertension   • Hypogonadism in male   • Neuropathy with IgA monoclonal gammopathy (CMS/HCC)   • Migraine   • Neuropathy   • Pulmonary nodule   • Vertigo   • Tinea versicolor   • Acute frontal sinusitis   • Acute ischemic stroke (CMS/HCC)   • Cerebrovascular atherosclerosis seen on imaging   • Tobacco use   • CKD (chronic kidney disease) stage 3, GFR 30-59 ml/min (CMS/HCC)   • Elevated troponin   • Expressive aphasia        Therapy Treatment  Rehabilitation Treatment Summary     Row Name 20 1215 20 1006          Treatment Time/Intention    Discipline  speech language pathologist  -SA  occupational therapist  -SG     Document Type  therapy note (daily note)  -SA  therapy note (daily note)  -SG     Subjective Information  no complaints  -SA  no complaints  -SG     Mode of Treatment  individual therapy;speech-language pathology  -SA  occupational therapy  -SG     Patient/Family Observations  Pt in bed, no family present at this time. Spoke to pt's wife on the phone.   -SA  Pt in bed, no family present at this time.  -SG     Therapy Frequency (OT Eval)  --  daily  -SG     Therapy Frequency (SLP SLC)  5 days per week  -SA  --     Patient Effort  good  -SA   good  -SG     Existing Precautions/Restrictions  --  fall  -SG     Recorded by [SA] Kam Retana MS CCC-SLP 03/25/20 1310 [SG] Sabine Olivo OTR/L 03/25/20 1340     Row Name 03/25/20 1006             Vital Signs    Pre Systolic BP Rehab  -- JUNAID, RN cleared for OT treatment  -SG      Pre Patient Position  Supine  -SG      Intra Patient Position  Standing  -SG      Post Patient Position  Supine  -SG      Recorded by [SG] Sabine Olivo OTR/L 03/25/20 1340      Row Name 03/25/20 1006             Cognitive Assessment/Intervention- PT/OT    Affect/Mental Status (Cognitive)  WFL;confused  -SG      Orientation Status (Cognition)  oriented x 3  -SG      Follows Commands (Cognition)  follows one step commands;75-90% accuracy  -SG      Cognitive Function (Cognitive)  safety deficit  -SG      Safety Deficit (Cognitive)  ability to follow commands;awareness of need for assistance;insight into deficits/self awareness;safety precautions awareness  -SG      Personal Safety Interventions  gait belt;nonskid shoes/slippers when out of bed  -SG      Recorded by [SG] Sabine Olivo OTR/L 03/25/20 1340      Row Name 03/25/20 1006             Bed Mobility Assessment/Treatment    Bed Mobility Assessment/Treatment  supine-sit;sit-supine  -SG      Supine-Sit [place] (Bed Mobility)  conditional independence  -SG      Sit-Supine [place] (Bed Mobility)  conditional independence  -SG      Assistive Device (Bed Mobility)  bed rails;head of bed elevated  -SG      Recorded by [SG] Sabine Olivo OTR/L 03/25/20 1340      Row Name 03/25/20 1006             Functional Mobility    Functional Mobility- Ind. Level  verbal cues required;standby assist  -SG      Functional Mobility-Distance (Feet)  20  -SG      Functional Mobility- Comment  Pt ambulated from bed to bathroom to complete toileting and grooming at sink w/ SBA  -SG      Recorded by [SG] Sabine Olivo OTR/L 03/25/20 1340      Row Name 03/25/20 1006             Transfer  Assessment/Treatment    Transfer Assessment/Treatment  sit-stand transfer;stand-sit transfer;toilet transfer  -SG      Comment (Transfers)  VCs for safety awareness.  -SG      Recorded by [SG] Sabine Olivo OTR/L 03/25/20 1340      Row Name 03/25/20 1006             Sit-Stand Transfer    Sit-Stand [place] (Transfers)  supervision;verbal cues  -SG      Recorded by [SG] Sabine Olivo OTR/L 03/25/20 1340      Row Name 03/25/20 1006             Stand-Sit Transfer    Stand-Sit [place] (Transfers)  supervision;verbal cues  -SG      Recorded by [SG] Sabine Olivo OTR/L 03/25/20 1340      Row Name 03/25/20 1006             Toilet Transfer    Type (Toilet Transfer)  sit-stand;stand-sit  -SG      [place] Level (Toilet Transfer)  supervision  -SG      Recorded by [SG] Sabine Olivo OTR/L 03/25/20 1340      Row Name 03/25/20 1006             ADL Assessment/Intervention    87230 - OT Self Care/Mgmt Minutes  14  -SG      BADL Assessment/Intervention  lower body dressing;grooming;toileting  -SG      Recorded by [SG] Sabine Olivo OTR/L 03/25/20 1340      Row Name 03/25/20 1006             Lower Body Dressing Assessment/Training    Lower Body Dressing [place] Level  doff;don;shoes/slippers  -SG      Lower Body Dressing Position  edge of bed sitting  -SG      Recorded by [SG] Sabine Olivo OTR/L 03/25/20 1340      Row Name 03/25/20 1006             Grooming Assessment/Training    [place] Level (Grooming)  oral care regimen;wash face, hands;supervision  -SG      Grooming Position  unsupported standing  -SG      Comment (Grooming)  Pt stood at sink to complete grooming routine, washing face and hands and brushing teeth completing oral care  -SG      Recorded by [SG] Sabine Olivo OTR/L 03/25/20 1340      Row Name 03/25/20 1006             Toileting Assessment/Training    [place] Level (Toileting)  toileting skills;supervision  -SG      Toileting Position  unsupported standing  -SG      Recorded by  [SG] Sabine Olivo OTR/L 03/25/20 1340      Row Name 03/25/20 1006             BADL Safety/Performance    Impairments, BADL Safety/Performance  balance  -SG      Cognitive Impairments, BADL Safety/Performance  awareness, need for assistance;insight into deficits/self awareness;safety precaution awareness  -SG      Recorded by [SG] Sabine Olivo OTR/L 03/25/20 1340      Row Name 03/25/20 1006             Motor Skills Assessment/Interventions    Additional Documentation  Balance (Group);Motor Coordination Assessment/Training (Group)  -SG      Recorded by [SG] Sabine Olivo OTR/L 03/25/20 1340      Row Name 03/25/20 1006             Therapeutic Exercise    99449 - OT Therapeutic Activity Minutes  13  -SG      Recorded by [SG] Sabine Olivo OTR/L 03/25/20 1340      Row Name 03/25/20 1006             Balance    Balance  static sitting balance;static standing balance;dynamic sitting balance;dynamic standing balance  -SG      Recorded by [SG] Sabine Olivo OTR/L 03/25/20 1340      Row Name 03/25/20 1006             Static Sitting Balance    Level of [place] (Unsupported Sitting, Static Balance)  independent  -SG      Sitting Position (Unsupported Sitting, Static Balance)  sitting on edge of bed  -SG      Time Able to Maintain Position (Unsupported Sitting, Static Balance)  more than 5 minutes  -SG      Recorded by [SG] Sabine Olivo OTR/L 03/25/20 1340      Row Name 03/25/20 1006             Dynamic Sitting Balance    Level of [place], Reaches Outside Midline (Sitting, Dynamic Balance)  conditional independence  -SG      Sitting Position, Reaches Outside Midline (Sitting, Dynamic Balance)  sitting on edge of bed  -SG      Comment, Reaches Outside Midline (Sitting, Dynamic Balance)  Completing [facility] activities  -SG      Recorded by [SG] Sabine Olivo OTR/L 03/25/20 1340      Row Name 03/25/20 1006             Static Standing Balance    Level of [place] (Supported Standing, Static Balance)  supervision   -SG      Time Able to Maintain Position (Supported Standing, Static Balance)  4 to 5 minutes  -SG      Recorded by [SG] Sabine Olivo OTR/L 03/25/20 1340      Row Name 03/25/20 1006             Dynamic Standing Balance    Level of [place], Reaches Outside Midline (Standing, Dynamic Balance)  supervision  -SG      Time Able to Maintain Position, Reaches Outside Midline (Standing, Dynamic Balance)  4 to 5 minutes  -SG      Recorded by [SG] Sabine Olivo OTR/L 03/25/20 1340      Row Name 03/25/20 1006             Motor Coordination Assessment/Training    Comment, Fine Motor Coordination Training  Pt participated in further [facility] testing and activities. Pt completed fine motor manipulation w/ RUE using washers, small and medium sized. He completed tasks w/ only mild difficulty w/ [facility] and following commands.  -SG      Recorded by [SG] Sabine Olivo OTR/L 03/25/20 1340      Row Name 03/25/20 1006             Positioning and Restraints    Pre-Treatment Position  in bed  -SG      Post Treatment Position  bed  -SG      In Bed  notified nsg;supine;fowlers;call light within reach;encouraged to call for assist;exit alarm on  -SG      Recorded by [SG] Sabine Olivo OTR/L 03/25/20 1340      Row Name 03/25/20 1215 03/25/20 1006          Pain Assessment    Additional Documentation  Pain Scale: FACES Pre/Post-Treatment (Group)  -  Pain Scale: Numbers Pre/Post-Treatment (Group)  -SG     Recorded by [SA] Kam Retana MS CCC-SLP 03/25/20 1310 [SG] Sabine Olivo OTR/L 03/25/20 1340     Row Name 03/25/20 1006             Pain Scale: Numbers Pre/Post-Treatment    Pain Scale: Numbers, Pretreatment  0/10 - no pain  -SG      Pain Scale: Numbers, Post-Treatment  0/10 - no pain  -SG      Recorded by [SG] Sabine Olivo OTR/L 03/25/20 1340      Row Name 03/25/20 1215             Pain Scale: FACES Pre/Post-Treatment    Pain: FACES Scale, Pretreatment  0-->no hurt  -SA      Pain: FACES Scale, Post-Treatment  0-->no hurt  -SA       Recorded by [SA] Kam Retana MS CCC-SLP 03/25/20 1310      Row Name 03/25/20 1006             Plan of Care Review    Plan of Care Reviewed With  patient  -SG      Progress  improving  -SG      Recorded by [SG] Sabine Olivo OTR/L 03/25/20 1340      Row Name 03/25/20 1006             Outcome Summary/Treatment Plan (OT)    Daily Summary of Progress (OT)  progress toward functional goals is good  -SG      Recorded by [SG] Sabine Olivo OTR/L 03/25/20 1341      Row Name 03/25/20 1215             Outcome Summary/Treatment Plan (SLP)    Daily Summary of Progress (SLP)  progress toward functional goals as expected  -SA      Plan for Continued Treatment (SLP)  Pt seen for cognitve-communication tx this date. Skills addressed included orientation, immediate memory, sustained/selective attention, and problem solving. Pt demonstrated deficits in all areas addressed and required mod to max cueing during tx. Spoke w/ pt and wife (on the phone) regarding recommended speech services at the next level of care to maximize rehabiliation related to cognitve-communication. Pt and wife agreeable.    -SA2      Anticipated Dischage Disposition  unknown;anticipate therapy at next level of care  -SA      Recorded by [SA] Kam Retana MS CCC-SLP 03/25/20 1348  [SA2] Kam Retana MS CCC-SLP 03/25/20 1310        User Key  (r) = Recorded By, (t) = Taken By, (c) = Cosigned By    Initials Name Effective Dates Discipline     Sabine Olivo OTR/L 01/20/20 -  OT    SA Kam Retana MS CCC-SLP 03/16/20 -  SLP          EDUCATION  The patient has been educated in the following areas:   Cognitive Impairment Communication Impairment.    SLP Recommendation and Plan  Daily Summary of Progress (SLP): progress toward functional goals as expected     Plan for Continued Treatment (SLP): Pt seen for cognitve-communication tx this date. Skills addressed included orientation, immediate memory, sustained/selective attention,  and problem solving. Pt demonstrated deficits in all areas addressed and required mod to max cueing during tx. Spoke w/ pt and wife (on the phone) regarding recommended speech services at the next level of care to maximize rehabiliation related to cognitve-communication. Pt and wife agreeable.    Anticipated Dischage Disposition: unknown, anticipate therapy at next level of care             SLP GOALS     Row Name 03/25/20 1215 03/24/20 1300          Comprehend Questions Goal 1 (SLP)    Improve Ability to Comprehend Questions Goal 1 (SLP)  simple wh questions;complex wh questions;multi-unit questions;abstract questions;80%;with minimal cues (75-90%)  -SA  simple wh questions;complex wh questions;multi-unit questions;abstract questions;80%;with minimal cues (75-90%)  -     Time Frame (Comprehend Questions Goal 1, SLP)  by discharge  -SA  by discharge  -     Progress (Ability to Comprehend Questions Goal 1, SLP)  70%;with moderate cues (50-74%)  -SA  --     Progress/Outcomes (Comprehend Questions Goal 1, SLP)  continuing progress toward goal  -SA  --        Follow Directions Goal 2 (SLP)    Improve Ability to Follow Directions Goal 1 (SLP)  2 step commands;3 step commands;90%;with minimal cues (75-90%)  -SA  2 step commands;3 step commands;90%;with minimal cues (75-90%)  -     Time Frame (Follow Directions Goal 1, SLP)  by discharge  -SA  by discharge  -     Progress/Outcomes (Follow Directions Goal 1, SLP)  goal ongoing  -SA  --        Comprehension at Paragraph Level Goal 1 (SLP)    Improve Reading Comprehension at Paragraph Level Goal 1 (SLP)  answer questions re: paragraph read;simple;80%;with minimal cues (75-90%)  -SA  answer questions re: paragraph read;simple;80%;with minimal cues (75-90%)  -     Time Frame (Reading Comprehension at Paragraph Level Goal 1, SLP)  by discharge  -SA  by discharge  -     Progress/Outcomes (Reading Comprehension at Paragraph Level Goal 1, SLP)  goal ongoing  -SA  --         Word Retrieval Skills Goal 1 (SLP)    Improve Word Retrieval Skills By Goal 1 (SLP)  responsive naming task;completing functional word finding tasks;completing a divergent task;80%;with minimal cues (75-90%)  -SA  responsive naming task;completing functional word finding tasks;completing a divergent task;80%;with minimal cues (75-90%)  -     Time Frame (Word Retrieval Goal 1, SLP)  by discharge  -SA  by discharge  -     Progress/Outcomes (Word Retrieval Goal 1, SLP)  goal ongoing  -SA  --        Attention Goal 1 (SLP)    Improve Attention by Goal 1 (SLP)  complete sustained attention task;80%;with minimal cues (75-90%)  -SA  complete sustained attention task;80%;with minimal cues (75-90%)  -CH     Time Frame (Attention Goal 1, SLP)  by discharge  -SA  by discharge  -     Progress (Attention Goal 1, SLP)  60%;with moderate cues (50-74%)  -SA  --     Comment (Attention Goal 1, SLP)  Pt required mod-max cueing to remain on topic. Required mod-max cues for selective attention during orientation questions.   -SA  --        Orientation Goal 1 ([facility])    Improve Orientation Through Goal 1 ([facility])  demonstrating orientation to day;demonstrating orientation to month;demonstrating orientation to year;demonstrating orientation to place;demonstrating orientation to disease/impairment;use environmental aids to assist with orientation;80%;with minimal cues (75-90%)  -SA  demonstrating orientation to day;demonstrating orientation to month;demonstrating orientation to year;demonstrating orientation to place;demonstrating orientation to disease/impairment;use environmental aids to assist with orientation;80%;with minimal cues (75-90%)  -     Time Frame (Orientation Goal 1, [facility])  by discharge  -SA  by discharge  -     Progress (Orientation Goal 1, SLP)  50%;with moderate cues (50-74%)  -SA  --     Progress/Outcomes (Orientation Goal 1, SLP)  continuing progress toward goal  -SA  --     Comment (Orientation Goal 1, SLP)  Pt  oriented to month and year, but not day or date.   -SA  --        Memory Skills Goal 1 (SLP)    Improve Memory Skills Through Goal 1 (SLP)  recalling related word lists immediately;recalling unrelated word lists immediately;recall details from a word list;use memory strategies;80%;with minimal cues (75-90%)  -SA  recalling related word lists immediately;recalling unrelated word lists immediately;recall details from a word list;use memory strategies;80%;with minimal cues (75-90%)  -     Time Frame (Memory Skills Goal 1, SLP)  by discharge  -SA  by discharge  -     Progress (Memory Skills Goal 1, SLP)  70%;with moderate cues (50-74%)  -SA  --     Progress/Outcomes (Memory Skills Goal 1, SLP)  continuing progress toward goal  -SA  --        Organizational Skills Goal 1 (SLP)    Improve Thought Organization Through Goal 1 (SLP)  completing a divergent naming task;generating a list of items in a category;completing a verbal sequencing task;80%;with minimal cues (75-90%)  -SA  completing a divergent naming task;generating a list of items in a category;completing a verbal sequencing task;80%;with minimal cues (75-90%)  -     Time Frame (Thought Organization Skills Goal 1, SLP)  by discharge  -SA  by discharge  -     Progress/Outcomes (Thought Organization Skills Goal 1, SLP)  goal ongoing  -SA  --        Functional Problem Solving Skills Goal 1 (SLP)    Improve Problem Solving Through Goal 1 (SLP)  answer questions about ADL problems;determine solutions to simple ADL/safety problems;name items for a task;90%;with minimal cues (75-90%)  -SA  answer questions about ADL problems;determine solutions to simple ADL/safety problems;name items for a task;90%;with minimal cues (75-90%)  -     Time Frame (Problem Solving Goal 1, SLP)  by discharge  -SA  by discharge  -     Progress (Problem Solving Goal 1, SLP)  70%;with maximum cues (25-49%)  -SA  --     Comment (Problem Solving Goal 1, SLP)  Pt required mod-max cueing  to not skew away from question/answer appropriately   -  --        Additional Goal 1 (SLP)    Additional Goal 1, SLP  LTG: Patient will improve language and cognitive skills to [facility] in order to better participate in his own care and rehab.   -SA  LTG: Patient will improve language and cognitive skills to [facility] in order to better participate in his own care and rehab.   -     Time Frame (Additional Goal 1, SLP)  by discharge  -SA  by discharge  -     Progress/Outcomes (Additional Goal 1, SLP)  continuing progress toward goal  -  --       User Key  (r) = Recorded By, (t) = Taken By, (c) = Cosigned By    Initials Name Provider Type    Trina Dominguez MS CCC-SLP Speech and Language Pathologist    Kam Alarcon MS CCC-SLP Speech and Language Pathologist              Time Calculation:     Time Calculation- SLP     Row Name 03/25/20 1322             Time Calculation- SLP    SLP Start Time  1215  -      SLP Received On  03/25/20  -        User Key  (r) = Recorded By, (t) = Taken By, (c) = Cosigned By    Initials Name Provider Type    Kam Alarcon MS CCC-SLP Speech and Language Pathologist          Therapy Charges for Today     Code Description Service Date Service Provider Modifiers Qty    [id number]  ST TREATMENT SPEECH 4 3/25/2020 Kma Retana MS CCC-SLP GN 1                     Kam Retana MS CCC-JOSE  3/25/2020

## 2020-03-25 NOTE — THERAPY TREATMENT NOTE
"Patient Name: Lux Mckoy  : 1970    MRN: [id number]                              Today's Date: 3/25/2020       Admit Date: 3/22/2020    Visit Dx:     ICD-10-CM ICD-9-CM   1. Acute CVA (cerebrovascular accident) (CMS/[facility]) I63.9 434.91   2. Decreased activities of daily living (ADL) Z78.9 V49.89   3. Cognitive communication deficit R41.841 799.52   4. Aphasia R47.01 784.3     Patient Active Problem List   Diagnosis   • Allergic rhinitis   • [place] esophagus   • Chronic pain   • Chronic prostatitis   • Depression   • Dyslipidemia   • GERD (gastroesophageal reflux disease)   • Hypertension   • Hypogonadism in male   • Neuropathy with IgA monoclonal gammopathy (CMS/[facility])   • Migraine   • Neuropathy   • Pulmonary nodule   • Vertigo   • Tinea versicolor   • Acute frontal sinusitis   • Acute ischemic stroke (CMS/[facility])   • Cerebrovascular atherosclerosis seen on imaging   • Tobacco use   • CKD (chronic kidney disease) stage 3, GFR 30-59 ml/min (CMS/[facility])   • Elevated troponin     Past Medical History:   Diagnosis Date   • Dupuytren contracture     s/p bilateral hand surgery   • Dyspnea    • Kidney infection    • Pancreatitis      Past Surgical History:   Procedure Laterality Date   • CERVICAL DISCECTOMY ANTERIOR  2017    C6-7 at [facility]   • HAND SURGERY     • LUMBAR DISCECTOMY      \"Spinal\" times 2 in  and    • UMBILICAL HERNIA REPAIR      Dr. Diego Vasquez     General Information     Row Name 20 0858          PT Evaluation Time/Intention    Document Type  therapy note (daily note)  -NS     Mode of Treatment  individual therapy;physical therapy  -NS     Row Name 20 0856          General Information    Patient Profile Reviewed?  yes  -NS     Existing Precautions/Restrictions  fall  -NS     Row Name 20 0858          Cognitive Assessment/Intervention- PT/OT    Orientation Status (Cognition)  oriented x 3  -NS     Row Name 20 0834          Safety Issues, Functional " Mobility    Safety Issues Affecting Function (Mobility)  insight into deficits/self awareness;safety precaution awareness;safety precautions follow-through/compliance  -NS     Impairments Affecting Function (Mobility)  balance;cognition;endurance/activity tolerance;coordination  -NS       User Key  (r) = Recorded By, (t) = Taken By, (c) = Cosigned By    Initials Name Provider Type    Allison Tavera PT Physical Therapist        Mobility     Row Name 03/25/20 0858          Bed Mobility Assessment/Treatment    Bed Mobility Assessment/Treatment  supine-sit  -NS     Supine-Sit [place] (Bed Mobility)  conditional independence  -NS     Assistive Device (Bed Mobility)  bed rails;head of bed elevated  -NS     Row Name 03/25/20 0858          Transfer Assessment/Treatment    Comment (Transfers)  VCs for safety awareness.   -NS     Row Name 03/25/20 0858          Sit-Stand Transfer    Sit-Stand [place] (Transfers)  supervision;verbal cues  -NS     Row Name 03/25/20 0858          Gait/Stairs Assessment/Training    Gait/Stairs Assessment/Training  gait/ambulation independence  -NS     [place] Level (Gait)  stand by assist  -NS     Distance in Feet (Gait)  400  -NS     Pattern (Gait)  step-through  -NS     Deviations/Abnormal Patterns (Gait)  base of support, narrow;prudencio decreased;stride length decreased  -NS     Bilateral Gait Deviations  heel strike decreased  -NS     Comment (Gait/Stairs)  Patient ambulated with narrow RAKEL and decreased step length. No episodes of LOB or unsteadiness. Pt mildly impulsive with mobility with decreased safety awareness but moves well. Pt practiced backwards walking and dual task gait with head turns and naming vegetables.   -NS       User Key  (r) = Recorded By, (t) = Taken By, (c) = Cosigned By    Initials Name Provider Type    Allison Tavera PT Physical Therapist        Obj/Interventions     Row Name 03/25/20 0858          Therapeutic Exercise    Lower Extremity  (Therapeutic Exercise)  marching while standing  -NS     Lower Extremity Range of Motion (Therapeutic Exercise)  hip abduction/adduction, bilateral;hip flexion/extension, bilateral;ankle dorsiflexion/plantar flexion, bilateral  -NS     Comment (Therapeutic Exercise)  Issued written HEP.   -NS     Row Name 03/25/20 0858          Static Sitting Balance    Level of [place] (Unsupported Sitting, Static Balance)  independent  -NS     Sitting Position (Unsupported Sitting, Static Balance)  sitting on edge of bed  -NS     Time Able to Maintain Position (Unsupported Sitting, Static Balance)  15 to 30 seconds  -NS     Row Name 03/25/20 0858          Dynamic Sitting Balance    Level of [place], Reaches Outside Midline (Sitting, Dynamic Balance)  supervision  -NS     Sitting Position, Reaches Outside Midline (Sitting, Dynamic Balance)  sitting on edge of bed  -NS     Row Name 03/25/20 0858          Static Standing Balance    Level of [place] (Supported Standing, Static Balance)  supervision  -NS     Time Able to Maintain Position (Supported Standing, Static Balance)  15 to 30 seconds  -NS     Row Name 03/25/20 0858          Dynamic Standing Balance    Level of [place], Reaches Outside Midline (Standing, Dynamic Balance)  supervision  -NS     Time Able to Maintain Position, Reaches Outside Midline (Standing, Dynamic Balance)  more than 5 minutes  -NS       User Key  (r) = Recorded By, (t) = Taken By, (c) = Cosigned By    Initials Name Provider Type    Allison Tavera, PT Physical Therapist        Goals/Plan     Row Name 03/25/20 0858          Transfer Goal 1 (PT)    Activity/Assistive Device (Transfer Goal 1, PT)  sit-to-stand/stand-to-sit;bed-to-chair/chair-to-bed;toilet  -NS     [place] Level/Cues Needed (Transfer Goal 1, PT)  independent  -NS     Time Frame (Transfer Goal 1, PT)  long term goal (LTG);10 days  -NS     Progress/Outcome (Transfer Goal 1, PT)  goal revised this date;goal ongoing  -NS      Row Name 03/25/20 0858          Gait Training Goal 1 (PT)    Activity/Assistive Device (Gait Training Goal 1, PT)  gait (walking locomotion);improve balance and speed;increase endurance/gait distance  -NS     [place] Level (Gait Training Goal 1, PT)  independent  -NS     Distance (Gait Goal 1, PT)  500  -NS     Time Frame (Gait Training Goal 1, PT)  long term goal (LTG);10 days  -NS     Progress/Outcome (Gait Training Goal 1, PT)  goal revised this date;goal ongoing  -NS       User Key  (r) = Recorded By, (t) = Taken By, (c) = Cosigned By    Initials Name Provider Type    Allison Tavera PT Physical Therapist        Clinical Impression     Row Name 03/25/20 0858          Pain Assessment    Additional Documentation  Pain Scale: Numbers Pre/Post-Treatment (Group)  -NS     Row Name 03/25/20 0858          Pain Scale: Numbers Pre/Post-Treatment    Pain Scale: Numbers, Pretreatment  0/10 - no pain  -NS     Pain Scale: Numbers, Post-Treatment  0/10 - no pain  -NS     Row Name 03/25/20 0858          Plan of Care Review    Plan of Care Reviewed With  patient  -NS     Progress  improving  -NS     Row Name 03/25/20 0858          Vital Signs    Pre Systolic BP Rehab  -- VSS-RN cleared for PT treatment  -NS     Pre Patient Position  Supine  -NS     Intra Patient Position  Standing  -NS     Post Patient Position  Sitting  -NS     Row Name 03/25/20 0858          Positioning and Restraints    Pre-Treatment Position  in bed  -NS     Post Treatment Position  chair  -NS     In Chair  notified nsg;call light within reach;encouraged to call for assist;exit alarm on;sitting  -NS       User Key  (r) = Recorded By, (t) = Taken By, (c) = Cosigned By    Initials Name Provider Type    Allison Tavera PT Physical Therapist        Outcome Measures     Row Name 03/25/20 0858          How much help from another person do you currently need...    Turning from your back to your side while in flat bed without using bedrails?  4  -NS      Moving from lying on back to sitting on the side of a flat bed without bedrails?  4  -NS     Moving to and from a bed to a chair (including a wheelchair)?  3  -NS     Standing up from a chair using your arms (e.g., wheelchair, bedside chair)?  3  -NS     Climbing 3-5 steps with a railing?  3  -NS     To walk in hospital room?  3  -NS     AM-PAC 6 Clicks Score (PT)  20  -NS     Row Name 03/25/20 0858          Modified [place] Scale    Modified [place] Scale  3 - Moderate disability.  Requiring some help, but able to walk without assistance.  -NS     Row Name 03/25/20 0858          Functional Assessment    Outcome Measure Options  AM-PAC 6 Clicks Basic Mobility (PT);Modified [place]  -NS       User Key  (r) = Recorded By, (t) = Taken By, (c) = Cosigned By    Initials Name Provider Type    Allison Tavera PT Physical Therapist          PT Recommendation and Plan     Outcome Summary/Treatment Plan (PT)  Anticipated Discharge Disposition (PT): home with assist, home with OP services  Plan of Care Reviewed With: patient  Progress: improving  Outcome Summary: Patient is progressing well towards PT goals. He transferred supine>sit with Cond I, STS with supervision, and ambulated 400ft without AD with SBA, demonstrating mildly decreased safety awareness but no episodes of LOB or unsteadiness. Continue to progress towards independence. Recommend home with assist and OPPT pending availability.     Time Calculation:   PT Charges     Row Name 03/25/20 0858             Time Calculation    Start Time  0858  -NS      PT Received On  03/25/20  -NS      PT Goal Re-Cert Due Date  04/02/20  -NS         Timed Charges    83587 - PT Therapeutic Exercise Minutes  19  -NS      87407 - Gait Training Minutes   23  -NS        User Key  (r) = Recorded By, (t) = Taken By, (c) = Cosigned By    Initials Name Provider Type    Allison Tavera PT Physical Therapist        Therapy Charges for Today     Code Description Service Date Service Provider  Modifiers Qty    89972555339 HC GAIT TRAINING EA 15 MIN 3/25/2020 Allison Doe, PT GP 2    30978251101 HC PT THER PROC EA 15 MIN 3/25/2020 Allison Doe, PT GP 1          PT G-Codes  Outcome Measure Options: AM-PAC 6 Clicks Basic Mobility (PT), Modified [place]  AM-PAC 6 Clicks Score (PT): 20  AM-PAC 6 Clicks Score (OT): 20  Modified [place] Scale: 3 - Moderate disability.  Requiring some help, but able to walk without assistance.    Allison Doe, PT  3/25/2020

## 2020-03-25 NOTE — PLAN OF CARE
Problem: Patient Care Overview  Goal: Plan of Care Review  Outcome: Ongoing (interventions implemented as appropriate)  Flowsheets (Taken 3/25/2020 1321)  Plan of Care Reviewed With: patient; spouse  Note:   SLP treatment completed. Will continue to address cognitive-communication. Please see note for further details and recommendations.

## 2020-03-25 NOTE — THERAPY TREATMENT NOTE
"Acute Care - Occupational Therapy Treatment Note  [facility]     Patient Name: Lux Mckoy  : 1970  MRN: [id number]  Today's Date: 3/25/2020  Onset of Illness/Injury or Date of Surgery: 20  Date of Referral to OT: 20       Admit Date: 3/22/2020       ICD-10-CM ICD-9-CM   1. Acute CVA (cerebrovascular accident) (CMS/[facility]) I63.9 434.91   2. Decreased activities of daily living (ADL) Z78.9 V49.89   3. Cognitive communication deficit R41.841 799.52   4. Aphasia R47.01 784.3   5. Seasonal allergic rhinitis due to pollen J30.1 477.0   6. Other chronic pain G89.29 338.29   7. Neuropathy G62.9 355.9     Patient Active Problem List   Diagnosis   • Allergic rhinitis   • [place] esophagus   • Chronic pain   • Chronic prostatitis   • Depression   • Dyslipidemia   • GERD (gastroesophageal reflux disease)   • Hypertension   • Hypogonadism in male   • Neuropathy with IgA monoclonal gammopathy (CMS/[facility])   • Migraine   • Neuropathy   • Pulmonary nodule   • Vertigo   • Tinea versicolor   • Acute frontal sinusitis   • Acute ischemic stroke (CMS/[facility])   • Cerebrovascular atherosclerosis seen on imaging   • Tobacco use   • CKD (chronic kidney disease) stage 3, GFR 30-59 ml/min (CMS/[facility])   • Elevated troponin   • Expressive aphasia     Past Medical History:   Diagnosis Date   • Dupuytren contracture     s/p bilateral hand surgery   • Dyspnea    • Kidney infection    • Pancreatitis      Past Surgical History:   Procedure Laterality Date   • CERVICAL DISCECTOMY ANTERIOR  2017    C6-7 at [facility]   • HAND SURGERY     • LUMBAR DISCECTOMY      \"Spinal\" times 2 in  and    • UMBILICAL HERNIA REPAIR      Dr. Diego Vasquez       Therapy Treatment    Rehabilitation Treatment Summary     Row Name 20 1006          Treatment Time/Intention    Discipline  occupational therapist  -SG     Document Type  therapy note (daily note)  -SG     Subjective Information  no complaints  -SG     Mode of Treatment "  occupational therapy  -SG     Patient/Family Observations  Pt in bed, no family present at this time.  -SG     Therapy Frequency (OT Eval)  daily  -SG     Therapy Frequency (SLP SLC)  --     Patient Effort  good  -SG     Existing Precautions/Restrictions  fall  -SG     Recorded by [SG] Sabine Olivo OTR/L 03/25/20 1340     Row Name 03/25/20 1006             Vital Signs    Pre Systolic BP Rehab  -- VSS, RN cleared for OT treatment  -SG      Pre Patient Position  Supine  -SG      Intra Patient Position  Standing  -SG      Post Patient Position  Supine  -SG      Recorded by [SG] Sabine Olivo OTR/L 03/25/20 1340      Row Name 03/25/20 1006             Cognitive Assessment/Intervention- PT/OT    Affect/Mental Status (Cognitive)  WFL;confused  -SG      Orientation Status (Cognition)  oriented x 3  -SG      Follows Commands (Cognition)  follows one step commands;75-90% accuracy  -SG      Cognitive Function (Cognitive)  safety deficit  -SG      Safety Deficit (Cognitive)  ability to follow commands;awareness of need for assistance;insight into deficits/self awareness;safety precautions awareness  -SG      Personal Safety Interventions  gait belt;nonskid shoes/slippers when out of bed  -SG      Recorded by [SG] Sabine Olivo OTR/L 03/25/20 1340      Row Name 03/25/20 1006             Bed Mobility Assessment/Treatment    Bed Mobility Assessment/Treatment  supine-sit;sit-supine  -SG      Supine-Sit [place] (Bed Mobility)  conditional independence  -SG      Sit-Supine [place] (Bed Mobility)  conditional independence  -SG      Assistive Device (Bed Mobility)  bed rails;head of bed elevated  -SG      Recorded by [SG] Sabine Olivo OTR/L 03/25/20 1340      Row Name 03/25/20 1006             Functional Mobility    Functional Mobility- Ind. Level  verbal cues required;standby assist  -SG      Functional Mobility-Distance (Feet)  20  -SG      Functional Mobility- Comment  Pt ambulated from bed to bathroom to complete  toileting and grooming at sink w/ SBA  -SG      Recorded by [SG] Sabine Olivo OTR/L 03/25/20 1340      Row Name 03/25/20 1006             Transfer Assessment/Treatment    Transfer Assessment/Treatment  sit-stand transfer;stand-sit transfer;toilet transfer  -SG      Comment (Transfers)  VCs for safety awareness.  -SG      Recorded by [SG] Sabine Olivo OTR/L 03/25/20 1340      Row Name 03/25/20 1006             Sit-Stand Transfer    Sit-Stand [place] (Transfers)  supervision;verbal cues  -SG      Recorded by [SG] Sabine Olivo OTR/L 03/25/20 1340      Row Name 03/25/20 1006             Stand-Sit Transfer    Stand-Sit [place] (Transfers)  supervision;verbal cues  -SG      Recorded by [SG] Sabine Olivo OTR/L 03/25/20 1340      Row Name 03/25/20 1006             Toilet Transfer    Type (Toilet Transfer)  sit-stand;stand-sit  -SG      [place] Level (Toilet Transfer)  supervision  -SG      Recorded by [SG] Sabine Olivo OTR/L 03/25/20 1340      Row Name 03/25/20 1006             ADL Assessment/Intervention    87536 - OT Self Care/Mgmt Minutes  14  -SG      BADL Assessment/Intervention  lower body dressing;grooming;toileting  -SG      Recorded by [SG] Sabine Olivo OTR/L 03/25/20 1340      Row Name 03/25/20 1006             Lower Body Dressing Assessment/Training    Lower Body Dressing [place] Level  doff;don;shoes/slippers  -SG      Lower Body Dressing Position  edge of bed sitting  -SG      Recorded by [SG] Sabine Olivo OTR/L 03/25/20 1340      Row Name 03/25/20 1006             Grooming Assessment/Training    [place] Level (Grooming)  oral care regimen;wash face, hands;supervision  -SG      Grooming Position  unsupported standing  -SG      Comment (Grooming)  Pt stood at sink to complete grooming routine, washing face and hands and brushing teeth completing oral care  -SG      Recorded by [SG] Sabine Olivo OTR/L 03/25/20 1340      Row Name 03/25/20 1006             Toileting  Assessment/Training    [place] Level (Toileting)  toileting skills;supervision  -SG      Toileting Position  unsupported standing  -SG      Recorded by [SG] Sabine Olivo OTR/L 03/25/20 1340      Row Name 03/25/20 1006             BADL Safety/Performance    Impairments, BADL Safety/Performance  balance  -SG      Cognitive Impairments, BADL Safety/Performance  awareness, need for assistance;insight into deficits/self awareness;safety precaution awareness  -SG      Recorded by [SG] Sabine Olivo OTR/L 03/25/20 1340      Row Name 03/25/20 1006             Motor Skills Assessment/Interventions    Additional Documentation  Balance (Group);Motor Coordination Assessment/Training (Group)  -SG      Recorded by [SG] Sabine Olivo OTR/L 03/25/20 1340      Row Name 03/25/20 1006             Therapeutic Exercise    78349 - OT Therapeutic Activity Minutes  13  -SG      Recorded by [SG] Sabine Olivo OTR/L 03/25/20 1340      Row Name 03/25/20 1006             Balance    Balance  static sitting balance;static standing balance;dynamic sitting balance;dynamic standing balance  -SG      Recorded by [SG] Sabine Olivo OTR/L 03/25/20 1340      Row Name 03/25/20 1006             Static Sitting Balance    Level of [place] (Unsupported Sitting, Static Balance)  independent  -SG      Sitting Position (Unsupported Sitting, Static Balance)  sitting on edge of bed  -SG      Time Able to Maintain Position (Unsupported Sitting, Static Balance)  more than 5 minutes  -SG      Recorded by [SG] Sabine Olivo OTR/L 03/25/20 1340      Row Name 03/25/20 1006             Dynamic Sitting Balance    Level of [place], Reaches Outside Midline (Sitting, Dynamic Balance)  conditional independence  -SG      Sitting Position, Reaches Outside Midline (Sitting, Dynamic Balance)  sitting on edge of bed  -SG      Comment, Reaches Outside Midline (Sitting, Dynamic Balance)  Completing [facility] activities  -SG      Recorded by [SG] Sabine Olivo,  SUNDAY/L 03/25/20 1340      Row Name 03/25/20 1006             Static Standing Balance    Level of [place] (Supported Standing, Static Balance)  supervision  -SG      Time Able to Maintain Position (Supported Standing, Static Balance)  4 to 5 minutes  -SG      Recorded by [SG] Sabine Olivo OTR/L 03/25/20 1340      Row Name 03/25/20 1006             Dynamic Standing Balance    Level of [place], Reaches Outside Midline (Standing, Dynamic Balance)  supervision  -SG      Time Able to Maintain Position, Reaches Outside Midline (Standing, Dynamic Balance)  4 to 5 minutes  -SG      Recorded by [SG] Sabine Olivo OTR/L 03/25/20 1340      Row Name 03/25/20 1006             Motor Coordination Assessment/Training    Comment, Fine Motor Coordination Training  Pt participated in further FMC testing and activities. Pt completed fine motor manipulation w/ RUE using washers, small and medium sized. He completed tasks w/ only mild difficulty w/ FMC and following commands.  -SG      Recorded by [SG] Sabine Olivo OTR/L 03/25/20 1340      Row Name 03/25/20 1006             Positioning and Restraints    Pre-Treatment Position  in bed  -SG      Post Treatment Position  bed  -SG      In Bed  notified nsg;supine;fowlers;call light within reach;encouraged to call for assist;exit alarm on  -SG      Recorded by [SG] Sabine Olivo OTR/L 03/25/20 1340      Row Name 03/25/20 1215 03/25/20 1006          Pain Assessment    Additional Documentation  Pain Scale: FACES Pre/Post-Treatment (Group)  -  Pain Scale: Numbers Pre/Post-Treatment (Group)  -SG     Recorded by [SA] Kam Retana, MS CCC-SLP 03/25/20 1310 [SG] Sabine Olivo OTR/L 03/25/20 1340     Row Name 03/25/20 1006             Pain Scale: Numbers Pre/Post-Treatment    Pain Scale: Numbers, Pretreatment  0/10 - no pain  -SG      Pain Scale: Numbers, Post-Treatment  0/10 - no pain  -SG      Recorded by [SG] Sabine Olivo OTR/L 03/25/20 1340      Row Name 03/25/20 1215              Pain Scale: FACES Pre/Post-Treatment    Pain: FACES Scale, Pretreatment  0-->no hurt  -SA      Pain: FACES Scale, Post-Treatment  0-->no hurt  -SA      Recorded by [SA] Kam Retana MS CCC-SLP 03/25/20 1310      Row Name 03/25/20 1006             Plan of Care Review    Plan of Care Reviewed With  patient  -SG      Progress  improving  -SG      Recorded by [SG] Sabine Olivo OTR/L 03/25/20 1340      Row Name 03/25/20 1006             Outcome Summary/Treatment Plan (OT)    Daily Summary of Progress (OT)  progress toward functional goals is good  -SG      Recorded by [SG] Sabine Olivo OTR/L 03/25/20 1341      Row Name 03/25/20 1215            User Key  (r) = Recorded By, (t) = Taken By, (c) = Cosigned By    Initials Name Effective Dates Discipline    SG Sabine Olivo, OTR/L 01/20/20 -  OT           Rehab Goal Summary        OT Recommendation and Plan  Outcome Summary/Treatment Plan (OT)  Daily Summary of Progress (OT): progress toward functional goals is good  Anticipated Equipment Needs at Discharge (OT): tub bench  Anticipated Discharge Disposition (OT): home with assist, home with home health  Planned Therapy Interventions (OT Eval): activity tolerance training, adaptive equipment training, BADL retraining, functional balance retraining, occupation/activity based interventions, patient/caregiver education/training, ROM/therapeutic exercise, strengthening exercise  Therapy Frequency (OT Eval): daily  Daily Summary of Progress (OT): progress toward functional goals is good  Plan of Care Review  Plan of Care Reviewed With: patient  Plan of Care Reviewed With: patient  Outcome Summary: Pt completed bed mobility w/ conditional ind. He stood from EOB and walked to bathroom w/ spv assist. He completed all toileting and grooming tasks w/ spv, standing at sink w/o LOB. Pt ambulated back to bed w/ spv assist, where he sat EOB to complete FMC tasks. He demo'd good manipulation w/ RUE, only mild FMC and  command following difficulties. Continue progress towards independence. Recommend home with assist and OPOT pending availability..  Outcome Measures     Row Name 03/25/20 1006 03/24/20 1318 03/23/20 0852       How much help from another is currently needed...    Putting on and taking off regular lower body clothing?  3  -SG  3  -SG  3  -SG    Bathing (including washing, rinsing, and drying)  3  -SG  3  -SG  3  -SG    Toileting (which includes using toilet bed pan or urinal)  3  -SG  3  -SG  3  -SG    Putting on and taking off regular upper body clothing  4  -SG  3  -SG  3  -SG    Taking care of personal grooming (such as brushing teeth)  4  -SG  4  -SG  3  -SG    Eating meals  4  -SG  4  -SG  4  -SG    AM-PAC 6 Clicks Score (OT)  21  -SG  20  -SG  19  -SG       Modified [place] Scale    Modified [place] Scale  3 - Moderate disability.  Requiring some help, but able to walk without assistance.  -SG  --  --       Functional Assessment    Outcome Measure Options  AM-PAC 6 Clicks Daily Activity (OT);Modified [place]  -SG  AM-PAC 6 Clicks Daily Activity (OT)  -SG  AM-PAC 6 Clicks Daily Activity (OT)  -      User Key  (r) = Recorded By, (t) = Taken By, (c) = Cosigned By    Initials Name Provider Type    Sabine Neves OTR/L Occupational Therapist           Time Calculation:   Time Calculation- OT     Row Name 03/25/20 1006          Time Calculation- OT    OT Start Time  1006  -     OT Received On  03/25/20  -     OT Goal Re-Cert Due Date  04/02/20  -        Timed Charges    83193 - Gait Training Minutes   --     63226 - OT Therapeutic Activity Minutes  13  -     71276 - OT Self Care/Mgmt Minutes  14  -       User Key  (r) = Recorded By, (t) = Taken By, (c) = Cosigned By    Initials Name Provider Type    Sabine Neves, OTR/L Occupational Therapist        Therapy Charges for Today     Code Description Service Date Service Provider Modifiers Qty    11576414882  OT THERAPEUTIC ACT EA 15 MIN 3/25/2020 Geovani  VAHID RegaladoR/L GO 1    [id number] HC OT SELF CARE/MGMT/TRAIN EA 15 MIN 3/25/2020 Sabine Olivo OTR/L GO 1               SUNDAY Hoffmann/HARDEEP  3/25/2020

## 2020-03-25 NOTE — TELEPHONE ENCOUNTER
KRYSTAL  [phone]    SUSAN  [phone]    NEEDS TO BE SEEN IN 6-8 WEEKS FOR   STROKE BEING DISCHARGED 03/25/20

## 2020-03-25 NOTE — DISCHARGE PLACEMENT REQUEST
"Lux Mckoy (49 y.o. Male)     Date of Birth Social Security Number Address Home Phone MRN    1970  [address] [id number] [phone] [id number]    Amish Marital Status          Congregational        Admission Date Admission Type Admitting Provider Attending Provider Department, Room/Bed    3/22/20 Emergency Joe Rasmussen MD Furlow, Stephen Matthew, MD [facility] 3E, S331/1    Discharge Date Discharge Disposition Discharge Destination         Rehab Facility or Unit (DC - External)              Attending Provider:  Joe Rasmussen MD    Allergies:  Augmentin [Amoxicillin-pot Clavulanate], Bactrim [Sulfamethoxazole-trimethoprim]    Isolation:  None   Infection:  None   Code Status:  CPR    Ht:  185.4 cm (73\")   Wt:  90.7 kg (200 lb)    Admission Cmt:  None   Principal Problem:  Acute ischemic stroke (CMS/[facility]) [I63.9]                 Active Insurance as of 3/22/2020     Primary Coverage     Payor Plan Insurance Group Employer/Plan Group    [employer] [place] [employer] [place]      Payor Plan Address Payor Plan Phone Number Payor Plan Fax Number Effective Dates    [address] [address] [phone]  6/10/2016 - None Entered    [facility] [id number]       Subscriber Name Subscriber Birth Date Member ID       LUX MCKOY 1970 [id number]                 Emergency Contacts      (Rel.) Home Phone Work Phone Mobile Phone    Meera Mckoy (Spouse) [phone] -- [phone]            Insurance Information                [facility]/[employer]LL[employer] MEDICAID Phone: [phone]    Subscriber: Lux Mckoy Subscriber#: [id number]    Group#:  Precert#:                Discharge Summary      Joe Rasmussen MD at [address] [place] [address] Medicine Services  DISCHARGE SUMMARY    Patient Name: Lux Mckoy  : 1970  MRN: [id number]    Date of Admission: 3/22/2020 11:10 AM  Date of Discharge:  " 3/25/2020  Primary Care Physician: Ozzie Castro MD    Consults: Neurology for stroke management, cardiology for CATY and chest pain    Hospital Course     Presenting Problem:   Acute ischemic stroke (CMS/HCC) [I63.9]    Active Hospital Problems    Diagnosis  POA   • **Acute ischemic stroke (CMS/HCC) [I63.9]  Yes   • Expressive aphasia [R47.01]  Yes   • Elevated troponin [R79.89]  Yes   • Cerebrovascular atherosclerosis seen on imaging [I70.90]  Yes   • Tobacco use [Z72.0]  Yes   • CKD (chronic kidney disease) stage 3, GFR 30-59 ml/min (CMS/HCC) [N18.3]  Yes   • Chronic pain [G89.29]  Yes   • Depression [F32.9]  Yes   • Dyslipidemia [E78.5]  Yes   • GERD (gastroesophageal reflux disease) [K21.9]  Yes   • Neuropathy [G62.9]  Yes   • Hypertension [I10]  Yes      Resolved Hospital Problems   No resolved problems to display.          Hospital Course:  Lux Mckoy is a 49 y.o. male with presents the hospital with word finding and speaking difficulties and right-sided numbness and was found on imaging to have acute ischemic stroke.    Patient was admitted for stroke management as per stroke protocol.  Patient had improvement with his dysarthria and his confusion also improved.  His numbness improved as well.  He was seen by neurology    Neurology noted that since the stroke was bilateral there was concern for embolic stroke and recommended CATY.  Cardiology was consulted and also with her discussion noted that patient had had some recent chest pain.  Patient did have a very minimally elevated troponin and they opted to perform a stress test and CATY.  Stress test was negative for ischemia.  CATY was negative for embolic source of stroke.  Cardiology signed off.    Neurology is recommending dual antiplatelet therapy with aspirin and Plavix as well as atorvastatin and management of patient's other medical issues.  He has been discharged with recommendation for follow-up in neurology clinic.      Smoking cessation is  essential at this time.  Further counseling today.  Patient says he will no longer smoke.  Continuing Lexapro as it appears to be helping patient's mood.  Monitoring for side effects none so so far.  Continue PT OT and speech therapy.  Renal function remained stable and at baseline.  Lab holiday tomorrow.     Acute ischemic stroke, cerebrovascular atherosclerosis seen on imaging:  Management per stroke protocol.  Aspirin, statin, Plavix, neurology team following and have cleared him for discharge, MRI positive for bilateral cerebellar strokes left greater than right, no significant carotid artery stenosis with imaging, patient received speech therapy, PT, OT.  I recommend acute rehab for aggressive cognitive therapy and continued PT OT.     Tobacco use: Cessation counseled.  Reportedly only smoking a few cigarettes a day.    Agrees to quit.  Smoking cessation is essential for further stroke prevention.     Essential hypertension: Initially held lisinopril to allow for permissive hypertension following ischemic stroke.  Can restart tomorrow     Stage III chronic kidney disease: BMP currently pending.  Plan to monitor renal function while hospitalized.  Baseline creatinine ranges from 1.5-1.7.  Remains at baseline.  Needs primary care follow-up after rehab discharge     Chronic pain, neuropathy: Decrease gabapentin dose for now.  Monitor mental status.  Decreased Percocet dose.  Currently doing well and received careful monitoring and supportive care.     Hyperlipidemia with hypertriglyceridemia: Triglycerides 228, LDL 83 with preferred goal less than 70.  Atorvastatin initiated     Gastroesophageal reflux disease: H2 blocker.  Stable without current symptoms     Depression: Wife reports patient has been off his medication.    Now started low-dose Lexapro for now as SSRIs can be beneficial in stroke recovery.  Patient tolerating it well, symptoms stable.  Recommend primary care follow-up for further monitoring and  management.     Chest pain and minimally elevated troponin: Now resolved no ischemic changes on reviewed EKG.  Reports remote negative stress test.  Repeat stress test this hospitalization negative.  No further chest pain.     Borderline low folic acid level of 6.5: Folic acid started.     Continue folic replacement at discharge.    At the time of discharge patient was told to take all medications as prescribed, keep all follow-up appointments, and call their doctor or return to the hospital with any worsening or concerning symptoms.    Please note that dragon voice recognition software was used to create this note and that transcription errors are possible.        Discharge Follow Up Recommendations for outpatient labs/diagnostics:  Recommend primary care follow-up in 1 week.    Day of Discharge     HPI:   Speech perhaps further improved he says.  Still has intermittent mild headache.  Numbness improved.  Notes he is still little forgetful.  Says he is agreeable to go to [facility].  No other new complaints.       Review of Systems  No current fevers or chills  No current shortness of breath or cough  No current nausea, vomiting, or diarrhea  No current chest pain or palpitations    Vital Signs:   Temp:  [98.1 °F (36.7 °C)-98.3 °F (36.8 °C)] 98.3 °F (36.8 °C)  Heart Rate:  [61-75] 61  Resp:  [18] 18  BP: (115-135)/() 117/104     Physical Exam:  Constitutional:Awake, alert  HENT: NCAT, mucous membranes moist, neck supple  Respiratory: Clear to auscultation bilaterally, respiratory effort normal, nonlabored breathing   Cardiovascular: RRR, normal radial pulses  Gastrointestinal: Positive bowel sounds, soft, nontender, nondistended  Musculoskeletal: Normal musculature for age, no lower extremity edema, BMI 26  Psychiatric: Appropriate affect, cooperative, conversational  Neurologic: Speech mostly clear with much less aphasia, follows commands well, somewhat poor historian but oriented  Skin: No rashes or  jaundice, warm    Pertinent  and/or Most Recent Results     Results from last 7 days   Lab Units 03/25/20  0429 03/24/20  0401 03/23/20  0451 03/22/20  1137 03/22/20  1125 03/22/20  1120   WBC 10*3/mm3  --  8.97 8.70 10.17  --   --    HEMOGLOBIN g/dL  --  15.9 15.8 16.3  --   --    HEMOGLOBIN, POC g/dL  --   --   --   --   --  16.3   HEMATOCRIT %  --  46.4 46.9 48.6  --   --    HEMATOCRIT POC %  --   --   --   --   --  48   PLATELETS 10*3/mm3  --  265 241 261  --   --    SODIUM mmol/L 140 138 131* 138  --   --    POTASSIUM mmol/L 4.0 4.2 4.1 4.7  --   --    CHLORIDE mmol/L 104 104 99 102  --   --    CO2 mmol/L 21.0* 21.0* 20.0* 23.0  --   --    BUN mg/dL 22* 18 18 17  --   --    CREATININE mg/dL 1.62* 1.68* 1.55* 1.58* 1.70*  --    GLUCOSE mg/dL 100* 88 94 97  --   --    CALCIUM mg/dL 8.9 9.2 9.0 9.7  --   --      Results from last 7 days   Lab Units 03/22/20  1137 03/22/20  1119   ALT (SGPT) U/L 27  --    AST (SGOT) U/L 27  --    PROTIME seconds  --  13.3   INR   --  1.1   APTT seconds 34.6  --      Results from last 7 days   Lab Units 03/23/20  0451   CHOLESTEROL mg/dL 157   TRIGLYCERIDES mg/dL 228*   HDL CHOL mg/dL 28*     Results from last 7 days   Lab Units 03/23/20  0451 03/22/20  1137   TSH uIU/mL 1.420  --    HEMOGLOBIN A1C % 5.40  --    TROPONIN T ng/mL 0.033* 0.070*       Brief Urine Lab Results     None          Microbiology Results Abnormal     None          Imaging Results (All)     Procedure Component Value Units Date/Time    MRI Brain Without Contrast [[id number]] Collected:  03/22/20 1532     Updated:  03/22/20 9353    Narrative:       EXAMINATION: MRI BRAIN WO CONTRAST - 03/22/2020     INDICATION: Confusion, difficult words and speech, unsteady gait, off  balance. Evaluate for stroke.     I63.9-Cerebral infarction, unspecified      TECHNIQUE: Multiplanar MRI of the brain without intravenous contrast.     COMPARISON: None.     FINDINGS: Abnormal restriction throughout the bilateral  cerebral  hemispheres. Multifocal appearance greatest region of fairly confluent  restriction in the left temporoparietal region as seen on perfusion and  prior imaging same day consistent with acute infarctions. There is  abnormal signal in the left temporoparietal region of edema and sulcal  effacement as seen on prior imaging is well without midline shift or  hydrocephalus. Mild background chronic small vessel ischemic disease  findings. Cervicomedullary junction widely patent. Pituitary and sella  within normal limits. Globes and orbits retain normal T2 signal  characteristics. Visualized paranasal sinuses and mastoid air cells are  grossly clear and well-pneumatized. No cerebellopontine angle mass  lesion.       Impression:       Abnormal restriction throughout the bilateral cerebral  hemispheres. Multifocal appearance greatest region of fairly confluent  restriction in the left temporoparietal region as seen on perfusion and  prior imaging same day consistent with acute multifocal infarctions of  concern for embolic etiology given bilateral and multifocal  distribution. No midline shift or hydrocephalus.     DICTATED:   03/22/2020  EDITED/ls :   03/22/2020          This report was finalized on 3/22/2020 6:56 PM by Dr. Kenneth Durand.       XR Chest 1 View [[id number]] Collected:  03/22/20 1348     Updated:  03/22/20 1858    Narrative:          EXAMINATION: XR CHEST 1 VW - 03/22/2020     INDICATION: Stroke protocol. I63.9-Cerebral infarction, unspecified.      COMPARISON: None.     FINDINGS: Cardiac size borderline enlarged with trace central vascular  congestion without focal consolidation, overt edema or effusion. No  pneumothorax.       Impression:       Borderline cardiomegaly with trace central vascular  congestion, however no overt edema or consolidation of acute parenchymal  process. No pleural effusion.     DICTATED:   03/22/2020  EDITED/ls :   03/22/2020      This report was finalized on 3/22/2020 6:55 PM  by Dr. Kenneth Durand.       CT Angiogram Neck [[id number]] Collected:  03/22/20 1138     Updated:  03/22/20 1230    Narrative:       EXAMINATION: CT ANGIOGRAM NECK, CT ANGIOGRAM HEAD - 03/22/2020     INDICATION: CVA, confusion, abnormal CT head.      TECHNIQUE: CT angiogram head and neck with intravenous contrast. 2D and  3D reconstructions performed.     The radiation dose reduction device was turned on for each scan per the  ALARA (As Low as Reasonably Achievable) protocol.     COMPARISON: CT head noncontrast and CT cerebral perfusion performed  concurrently     FINDINGS:      CTA NECK: Normal 3-vessel arch with patent great vessel origins.  Proximal subclavian arteries are patent. Vertebral arteries demonstrate  grossly symmetric caliber without focal severe stenosis, aneurysm or  occlusion through the cervical segments. Carotids demonstrate grossly  normal course and branching pattern with only minimal atherosclerotic  involvement noncalcified plaque formations at the bifurcations with less  than 10% right and 10% left luminal narrowing as measured by NASCET  criteria with distal internal carotid arteries patent to the  intracranial segments as discussed further below in the dedicated CTA  head portion. Cervical soft tissues unremarkable and without bulky  cervical adenopathy or abnormality of the lung apices.     CTA HEAD: Distal internal carotid arteries demonstrate minimal  atherosclerotic involvement including minimal calcific disease producing  mild luminal stenosis without high-grade stenosis, aneurysm or  occlusion. Anterior cerebral arteries are patent without hemodynamically  significant stenosis, aneurysm or occlusion. Middle cerebral arteries  without large vessel occlusion, however mild irregularities in the  bilateral M3 and M4 segments, left greater than right without distinct  occlusion evident. Vertebrobasilar system and posterior cerebral  arteries are patent without hemodynamically significant  stenosis,  aneurysm or occlusion. Visualized venous structures including superior  sagittal sinus patent.       Impression:       No large vessel occlusion including only minimal  atherosclerotic involvement of the carotid bifurcations, however within  the MCA distributions bilaterally, there are mild to moderate  irregularities concerning for atherosclerotic involvement without  distinct occlusion left greater than right of the M3 and M4 segments in  particular.     DICTATED:   03/22/2020  EDITED/ls :   03/22/2020         This report was finalized on 3/22/2020 12:27 PM by Dr. Kenneth Durand.       CT Cerebral Perfusion With & Without Contrast [[id number]] Collected:  03/22/20 1134     Updated:  03/22/20 1230    Narrative:       EXAMINATION: CT CEREBRAL PERFUSION WWO CONTRAST - 03/22/2020     INDICATION: CVA, confusion, abnormal CT head.     TECHNIQUE: CT cerebral perfusion with and without intravenous contrast.  Multiple parametric maps including mean transit time, time to drain,  cerebral blood flow and cerebral blood volume performed.     The radiation dose reduction device was turned on for each scan per the  ALARA (As Low as Reasonably Achievable) protocol.     COMPARISON: CT stroke head noncontrast performed immediately prior.     FINDINGS: Abnormal perfusion with prolongation of mean transit time and  time to drain within the left frontoparietal region distal left MCA  distribution with decreased cerebral blood flow, however cerebral blood  volume preserved consistent with reversible ischemia and no core infarct  element.       Impression:       Reversible ischemia within the left posterior MCA  distribution without evidence for core infarct component.     DICTATED:   03/22/2020  EDITED/ls :   03/22/2020      This report was finalized on 3/22/2020 12:27 PM by Dr. Kenneth Durand.       CT Angiogram Head [[id number]] Collected:  03/22/20 1138     Updated:  03/22/20 1230    Narrative:       EXAMINATION: CT ANGIOGRAM  NECK, CT ANGIOGRAM HEAD - 03/22/2020     INDICATION: CVA, confusion, abnormal CT head.      TECHNIQUE: CT angiogram head and neck with intravenous contrast. 2D and  3D reconstructions performed.     The radiation dose reduction device was turned on for each scan per the  ALARA (As Low as Reasonably Achievable) protocol.     COMPARISON: CT head noncontrast and CT cerebral perfusion performed  concurrently     FINDINGS:      CTA NECK: Normal 3-vessel arch with patent great vessel origins.  Proximal subclavian arteries are patent. Vertebral arteries demonstrate  grossly symmetric caliber without focal severe stenosis, aneurysm or  occlusion through the cervical segments. Carotids demonstrate grossly  normal course and branching pattern with only minimal atherosclerotic  involvement noncalcified plaque formations at the bifurcations with less  than 10% right and 10% left luminal narrowing as measured by NASCET  criteria with distal internal carotid arteries patent to the  intracranial segments as discussed further below in the dedicated CTA  head portion. Cervical soft tissues unremarkable and without bulky  cervical adenopathy or abnormality of the lung apices.     CTA HEAD: Distal internal carotid arteries demonstrate minimal  atherosclerotic involvement including minimal calcific disease producing  mild luminal stenosis without high-grade stenosis, aneurysm or  occlusion. Anterior cerebral arteries are patent without hemodynamically  significant stenosis, aneurysm or occlusion. Middle cerebral arteries  without large vessel occlusion, however mild irregularities in the  bilateral M3 and M4 segments, left greater than right without distinct  occlusion evident. Vertebrobasilar system and posterior cerebral  arteries are patent without hemodynamically significant stenosis,  aneurysm or occlusion. Visualized venous structures including superior  sagittal sinus patent.       Impression:       No large vessel occlusion  including only minimal  atherosclerotic involvement of the carotid bifurcations, however within  the MCA distributions bilaterally, there are mild to moderate  irregularities concerning for atherosclerotic involvement without  distinct occlusion left greater than right of the M3 and M4 segments in  particular.     DICTATED:   03/22/2020  EDITED/ls :   03/22/2020         This report was finalized on 3/22/2020 12:27 PM by Dr. Kenneth Durand.       CT Head Without Contrast Stroke Protocol [[id number]] Collected:  03/22/20 1111     Updated:  03/22/20 1230    Narrative:       EXAMINATION: CT HEAD WO CONTRAST - 03/22/2020     INDICATION: Evaluate for stroke.     TECHNIQUE: CT head without intravenous contrast.      The radiation dose reduction device was turned on for each scan per the  ALARA (As Low as Reasonably Achievable) protocol.     COMPARISON: None.     FINDINGS: Midline structures are without midline shift or hydrocephalus,  however asymmetry of abnormal low-attenuation left frontoparietal region  of the left posterior MCA distribution concerning for acute or evolving  infarction with cortical extension and sulcal effacement in this region.  No intra-axial hemorrhage or extra-axial fluid collection. Globes and  orbits unremarkable. Visualized paranasal sinuses and mastoid cells are  grossly clear and well pneumatized. Calvarium intact.       Impression:       Abnormal low-attenuation region left frontoparietal  posterior left MCA distribution involvement extending to involve the  cortex with sulcal effacement concerning for acute or evolving  infarction without intracranial hemorrhage.     Scan performed on 03/22/2020 at 1106 hours. Scan report given to ER  physician and team and made available on 03/22/2020 at 1112 hours.     DICTATED:   03/22/2020  EDITED/ls :   03/22/2020         This report was finalized on 3/22/2020 12:27 PM by Dr. Kenneth Durand.             Results for orders placed during the hospital  encounter of 03/22/20   Bilateral Carotid Duplex    Narrative · Proximal left internal carotid artery is normal.  · Mid left internal carotid artery is normal.  · Distal left internal carotid artery is normal.  · All other left side carotid system vessels are normal.  · Proximal right internal carotid artery is normal.  · Mid right internal carotid artery is normal.  · Distal right internal carotid artery is normal.  · All other right side carotid system vessels are normal.          Results for orders placed during the hospital encounter of 03/22/20   Bilateral Carotid Duplex    Narrative · Proximal left internal carotid artery is normal.  · Mid left internal carotid artery is normal.  · Distal left internal carotid artery is normal.  · All other left side carotid system vessels are normal.  · Proximal right internal carotid artery is normal.  · Mid right internal carotid artery is normal.  · Distal right internal carotid artery is normal.  · All other right side carotid system vessels are normal.          Results for orders placed during the hospital encounter of 03/22/20   Adult Transesophageal Echo (CATY) W/ Cont if Necessary Per Protocol    Narrative · Estimated EF = 60%.  · The cardiac valves are anatomically and functionally normal.  · Saline test results are negative.          Discharge Details        Discharge Medications      New Medications      Instructions Start Date   acetaminophen 325 MG tablet  Commonly known as:  TYLENOL   650 mg, Oral, Every 4 Hours PRN      aspirin 81 MG tablet   325 mg, Oral, Daily, Coated tab   Start Date:  March 26, 2020     atorvastatin 80 MG tablet  Commonly known as:  LIPITOR   80 mg, Oral, Nightly      clopidogrel 75 MG tablet  Commonly known as:  PLAVIX   75 mg, Oral, Daily   Start Date:  March 26, 2020     docusate sodium 100 MG capsule   100 mg, Oral, 2 Times Daily PRN      escitalopram 10 MG tablet  Commonly known as:  LEXAPRO   10 mg, Oral, Daily   Start Date:  March 26,  2020     folic acid 1 MG tablet  Commonly known as:  FOLVITE   1 mg, Oral, Daily   Start Date:  March 26, 2020     oxyCODONE-acetaminophen 5-325 MG per tablet  Commonly known as:  PERCOCET  Replaces:  oxyCODONE-acetaminophen 7.5-325 MG per tablet   1 tablet, Oral, Every 6 Hours PRN         Changes to Medications      Instructions Start Date   gabapentin 600 MG tablet  Commonly known as:  NEURONTIN  What changed:    · how much to take  · when to take this   300 mg, Oral, Nightly         Continue These Medications      Instructions Start Date   azelastine 0.15 % solution nasal spray  Commonly known as:  ASTEPRO   1 Squirt, Nasal, 2 Times Daily      cetirizine 10 MG tablet  Commonly known as:  zyrTEC   10 mg, Oral, Daily PRN      dicyclomine 10 MG capsule  Commonly known as:  BENTYL   10 mg, Oral, 3 Times Daily Before Meals      famotidine 40 MG tablet  Commonly known as:  PEPCID   20 mg, Nightly      lisinopril 2.5 MG tablet  Commonly known as:  PRINIVIL,ZESTRIL   TAKE 1 TABLET BY MOUTH ONCE DAILY.      pantoprazole 40 MG EC tablet  Commonly known as:  PROTONIX   40 mg, Oral, Daily         Stop These Medications    oxyCODONE-acetaminophen 7.5-325 MG per tablet  Commonly known as:  PERCOCET  Replaced by:  oxyCODONE-acetaminophen 5-325 MG per tablet            Allergies   Allergen Reactions   • Augmentin [Amoxicillin-Pot Clavulanate] Itching   • Bactrim [Sulfamethoxazole-Trimethoprim] Itching         Discharge Disposition:  Rehab Facility or Unit (DC - External)    Diet:  Hospital:  Diet Order   Procedures   • Diet Regular; Thin; Cardiac, Renal       Activity:  Activity Instructions     Activity as Tolerated               CODE STATUS:    Code Status and Medical Interventions:   Ordered at: 03/22/20 1208     Level Of Support Discussed With:    Patient     Code Status:    CPR     Medical Interventions (Level of Support Prior to Arrest):    Full           Additional Instructions for the Follow-ups that You Need to Schedule      Discharge Follow-up with PCP   As directed       Currently Documented PCP:    Ozzie Castro MD    PCP Phone Number:    [phone]     Follow Up Details:  1 week after rehab discharge         Discharge Follow-up with Specialty: Follow-up with Dr. Rodriguez in outpatient neurology clinic 6-8weeks   As directed      Specialty:  Follow-up with Dr. Rodriguez in outpatient neurology clinic 6-8weeks               Time Spent on Discharge:  35 minutes    Electronically signed by Joe Rasmussen MD, 03/25/20, 11:44 AM.        Electronically signed by Joe Rasmussen MD at 03/25/20 3446

## 2020-03-25 NOTE — PLAN OF CARE
Problem: Patient Care Overview  Goal: Plan of Care Review  Outcome: Ongoing (interventions implemented as appropriate)  Flowsheets (Taken 3/25/2020 6500)  Outcome Summary: Patient is progressing well towards PT goals. He transferred supine>sit with Cond I, STS with supervision, and ambulated 400ft without AD with SBA, demonstrating mildly decreased safety awareness but no episodes of LOB or unsteadiness. Continue to progress towards independence. Recommend home with assist and OPPT pending availability.

## 2020-03-25 NOTE — PLAN OF CARE
Problem: Patient Care Overview  Goal: Plan of Care Review  Flowsheets (Taken 3/25/2020 1006)  Outcome Summary: Pt completed bed mobility w/ conditional ind. He stood from EOB and walked to bathroom w/ spv assist. He completed all toileting and grooming tasks w/ spv, standing at sink w/o LOB. Pt ambulated back to bed w/ spv assist, where he sat EOB to complete FMC tasks. He demo'd good manipulation w/ RUE, only mild FMC and command following difficulties. Continue progress towards independence. Recommend home with assist and OPOT pending availability.

## 2020-03-25 NOTE — PROGRESS NOTES
[facility]  PROGRESS NOTE    Patient Name: Lux Mckoy  : 1970  MRN: [id number]    Date of Admission: 3/22/2020  Primary Care Physician: Ozzie Castro MD    Subjective   Subjective     CC:  Follow-up ischemic stroke and ataxic gait    HPI:  Speech perhaps further improved he says.  Still has intermittent mild headache.  Numbness improved.  Notes he is still little forgetful.  Says he is agreeable to go to [facility].  No other new complaints.       Review of Systems  No current fevers or chills  No current shortness of breath or cough  No current nausea, vomiting, or diarrhea  No current chest pain or palpitations    Objective   Objective     Vital Signs:   Temp:  [97.8 °F (36.6 °C)-98.3 °F (36.8 °C)] 98.2 °F (36.8 °C)  Heart Rate:  [61-88] 61  Resp:  [14-20] 18  BP: (115-158)/() 125/93  Total (NIH Stroke Scale): 1     Physical Exam:  Constitutional:Awake, alert  HENT: NCAT, mucous membranes moist, neck supple  Respiratory: Clear to auscultation bilaterally, respiratory effort normal, nonlabored breathing   Cardiovascular: RRR, normal radial pulses  Gastrointestinal: Positive bowel sounds, soft, nontender, nondistended  Musculoskeletal: Normal musculature for age, no lower extremity edema, BMI 26  Psychiatric: Appropriate affect, cooperative, conversational  Neurologic: Speech mostly clear with much less aphasia, follows commands well, somewhat poor historian but oriented  Skin: No rashes or jaundice, warm      Results Reviewed:  Results from last 7 days   Lab Units 20  0401 20  0451 20  1137  20  1119   WBC 10*3/mm3 8.97 8.70 10.17  --   --    HEMOGLOBIN g/dL 15.9 15.8 16.3  --   --    HEMOGLOBIN, POC   --   --   --    < >  --    HEMATOCRIT % 46.4 46.9 48.6  --   --    HEMATOCRIT POC   --   --   --    < >  --    PLATELETS 10*3/mm3 265 241 261  --   --    INR   --   --   --   --  1.1    < > = values in this interval not  displayed.     Results from last 7 days   Lab Units 03/25/20  0429 03/24/20  0401 03/23/20  0451 03/22/20  1137   SODIUM mmol/L 140 138 131* 138   POTASSIUM mmol/L 4.0 4.2 4.1 4.7   CHLORIDE mmol/L 104 104 99 102   CO2 mmol/L 21.0* 21.0* 20.0* 23.0   BUN mg/dL 22* 18 18 17   CREATININE mg/dL 1.62* 1.68* 1.55* 1.58*   GLUCOSE mg/dL 100* 88 94 97   CALCIUM mg/dL 8.9 9.2 9.0 9.7   ALT (SGPT) U/L  --   --   --  27   AST (SGOT) U/L  --   --   --  27   TROPONIN T ng/mL  --   --  0.033* 0.070*     Estimated Creatinine Clearance: 71.5 mL/min (A) (by C-G formula based on SCr of 1.62 mg/dL (H)).    Microbiology Results Abnormal     None          Imaging Results (Last 24 Hours)     ** No results found for the last 24 hours. **          Results for orders placed during the hospital encounter of 03/22/20   Adult Transesophageal Echo (CATY) W/ Cont if Necessary Per Protocol    Narrative · Estimated EF = 60%.  · The cardiac valves are anatomically and functionally normal.  · Saline test results are negative.          I have reviewed the medications:  Scheduled Meds:    aspirin 325 mg Oral Daily   Or      aspirin 300 mg Rectal Daily   atorvastatin 80 mg Oral Nightly   azelastine 2 spray Each Nare Daily   clopidogrel 75 mg Oral Daily   dicyclomine 10 mg Oral Daily With Breakfast, Lunch & Dinner   escitalopram 10 mg Oral Daily   famotidine 20 mg Oral Nightly   folic acid 1 mg Oral Daily   heparin (porcine) 5,000 Units Subcutaneous Q8H   pantoprazole 40 mg Oral Q AM   sodium chloride 10 mL Intravenous Q12H   sodium chloride 10 mL Intravenous Q12H     Continuous Infusions:   PRN Meds:.•  acetaminophen **OR** acetaminophen **OR** acetaminophen  •  cetirizine  •  docusate sodium  •  famotidine  •  melatonin  •  ondansetron **OR** ondansetron  •  oxyCODONE-acetaminophen  •  sodium chloride  •  sodium chloride  •  sodium chloride  •  SUMAtriptan    Assessment/Plan   Assessment & Plan     Active Hospital Problems    Diagnosis  POA   •  **Acute ischemic stroke (CMS/[facility]) [I63.9]  Yes   • Elevated troponin [R79.89]  Yes   • Cerebrovascular atherosclerosis seen on imaging [I70.90]  Yes   • Tobacco use [Z72.0]  Yes   • CKD (chronic kidney disease) stage 3, GFR 30-59 ml/min (CMS/[facility]) [N18.3]  Yes   • Chronic pain [G89.29]  Yes   • Depression [F32.9]  Yes   • Dyslipidemia [E78.5]  Yes   • GERD (gastroesophageal reflux disease) [K21.9]  Yes   • Neuropathy [G62.9]  Yes   • Hypertension [I10]  Yes      Resolved Hospital Problems   No resolved problems to display.        Brief Hospital Course to date:  Lux Mckoy is a 49 y.o. male with presents the hospital with word finding and speaking difficulties and right-sided numbness and was found on imaging to have acute ischemic stroke.    Discussion/plan: Case discussed with neurology.  With CATY negative plan is for dual antiplatelet therapy and atorvastatin at discharge.  Case discussed with cardiologist, stress test negative.  No further cardiac work-up planned at this time.  Continue Tylenol for headache.  Neurology also cleared patient for sumatriptan.  This appears to be stable.  Not uncommon following a stroke.  Likely will restart lisinopril at discharge but currently allowing permissive hypertension.  Normotensive at the moment.  Smoking cessation is essential at this time.  Further counseling today.  Patient says he will no longer smoke.  Continuing Lexapro as it appears to be helping patient's mood.  Monitoring for side effects none so so far.  Continue PT OT and speech therapy.  Renal function remained stable and at baseline.  Lab holiday tomorrow.    Acute ischemic stroke, cerebrovascular atherosclerosis seen on imaging:  Management per stroke protocol.  Aspirin, statin, neurology team following, MRI positive for bilateral cerebellar strokes left greater than right, no significant carotid artery stenosis with imaging, continue neuro checks, telemetry monitoring.  Speech therapy, PT,  OT.     Tobacco use: Cessation counseled.  Reportedly only smoking a few cigarettes a day.    Agrees to quit.     Essential hypertension: Hold lisinopril to allow for permissive hypertension following ischemic stroke.     Stage III chronic kidney disease: BMP currently pending.  Plan to monitor renal function while hospitalized.  Baseline creatinine ranges from 1.5-1.7.  Remains at baseline.     Chronic pain, neuropathy: Decrease gabapentin dose for now.  Monitor mental status.  Decrease Percocet dose.  Careful monitoring and supportive care.     Hyperlipidemia with hypertriglyceridemia: Triglycerides 228, LDL 83 with preferred goal less than 70.  Atorvastatin.      Gastroesophageal reflux disease: H2 blocker.  Stable without current symptoms     Depression: Wife reports patient has been off his medication.    Now started low-dose Lexapro for now as SSRIs can be beneficial in stroke recovery.  Patient tolerating it well, symptoms stable.    Chest pain and minimally elevated troponin: Now resolved no ischemic changes on reviewed EKG.  Reports remote negative stress test.  Repeat stress test this hospitalization negative.  No further chest pain.    Borderline low folic acid level: Folic acid started.  Likely continue at discharge.    DVT Prophylaxis: Subcutaneous heparin    Disposition: I expect the patient to be discharged possibly inpatient rehabilitation when work-up completed    CODE STATUS:   Code Status and Medical Interventions:   Ordered at: 03/22/20 1208     Level Of Support Discussed With:    Patient     Code Status:    CPR     Medical Interventions (Level of Support Prior to Arrest):    Full         Electronically signed by Joe Rasmussen MD, 03/25/20, 07:33.

## 2020-03-25 NOTE — PLAN OF CARE
Problem: Patient Care Overview  Goal: Plan of Care Review  Outcome: Ongoing (interventions implemented as appropriate)  Flowsheets (Taken 3/25/2020 2343)  Progress: no change  Plan of Care Reviewed With: patient  Outcome Summary: Pt rested well throughout night, c/o migraine, refused immitrex, NIH 1, slight decreased sensation in RUE and RLE, vss, will continue to monitor.

## 2020-03-25 NOTE — PAYOR COMM NOTE
"Emily Bowers RN  Utilization Review  P: [phone]  F: [phone]    Ref # [id number]  DC date = 3/25/2020  DC summary attached      Susan Mckoy (49 y.o. Male)     Date of Birth Social Security Number Address Home Phone MRN    1970  [address] [facility] [id number] [phone] [id number]    Voodoo Marital Status          Confucianism        Admission Date Admission Type Admitting Provider Attending Provider Department, Room/Bed    3/22/20 Emergency Joe Rasmussen MD  [facility] 3E, S331/1    Discharge Date Discharge Disposition Discharge Destination        3/25/2020 Rehab Facility or Unit (DC - External)              Attending Provider:  (none)   Allergies:  Augmentin [Amoxicillin-pot Clavulanate], Bactrim [Sulfamethoxazole-trimethoprim]    Isolation:  None   Infection:  None   Code Status:  CPR    Ht:  185.4 cm (73\")   Wt:  90.7 kg (200 lb)    Admission Cmt:  None   Principal Problem:  Acute ischemic stroke (CMS/HCC) [I63.9]                 Active Insurance as of 3/22/2020     Primary Coverage     Payor Plan Insurance Group Employer/Plan Group    [employer] [place] [employer] [place]      Payor Plan Address Payor Plan Phone Number Payor Plan Fax Number Effective Dates    [address] [address] [phone]  6/10/2016 - None Entered    [facility] [address]       Subscriber Name Subscriber Birth Date Member ID       SUSAN MCKOY 1970 [id number]                 Emergency Contacts      (Rel.) Home Phone Work Phone Mobile Phone    Meera Mckoy (Spouse) [phone] -- [phone]               Discharge Summary      Joe Rasmussen MD at 20 1144              [facility] Medicine Services  DISCHARGE SUMMARY    Patient Name: Susan Mckoy  : 1970  MRN: [id number]    Date of Admission: 3/22/2020 11:10 AM  Date of Discharge:  3/25/2020  Primary Care Physician: Ozzie Castro MD    Consults: Neurology for " stroke management, cardiology for CATY and chest pain    Hospital Course     Presenting Problem:   Acute ischemic stroke (CMS/HCC) [I63.9]    Active Hospital Problems    Diagnosis  POA   • **Acute ischemic stroke (CMS/HCC) [I63.9]  Yes   • Expressive aphasia [R47.01]  Yes   • Elevated troponin [R79.89]  Yes   • Cerebrovascular atherosclerosis seen on imaging [I70.90]  Yes   • Tobacco use [Z72.0]  Yes   • CKD (chronic kidney disease) stage 3, GFR 30-59 ml/min (CMS/HCC) [N18.3]  Yes   • Chronic pain [G89.29]  Yes   • Depression [F32.9]  Yes   • Dyslipidemia [E78.5]  Yes   • GERD (gastroesophageal reflux disease) [K21.9]  Yes   • Neuropathy [G62.9]  Yes   • Hypertension [I10]  Yes      Resolved Hospital Problems   No resolved problems to display.          Hospital Course:  Lux Mckoy is a 49 y.o. male with presents the hospital with word finding and speaking difficulties and right-sided numbness and was found on imaging to have acute ischemic stroke.    Patient was admitted for stroke management as per stroke protocol.  Patient had improvement with his dysarthria and his confusion also improved.  His numbness improved as well.  He was seen by neurology    Neurology noted that since the stroke was bilateral there was concern for embolic stroke and recommended CATY.  Cardiology was consulted and also with her discussion noted that patient had had some recent chest pain.  Patient did have a very minimally elevated troponin and they opted to perform a stress test and CATY.  Stress test was negative for ischemia.  CATY was negative for embolic source of stroke.  Cardiology signed off.    Neurology is recommending dual antiplatelet therapy with aspirin and Plavix as well as atorvastatin and management of patient's other medical issues.  He has been discharged with recommendation for follow-up in neurology clinic.      Smoking cessation is essential at this time.  Further counseling today.  Patient says he will no longer  smoke.  Continuing Lexapro as it appears to be helping patient's mood.  Monitoring for side effects none so so far.  Continue PT OT and speech therapy.  Renal function remained stable and at baseline.  Lab holiday tomorrow.     Acute ischemic stroke, cerebrovascular atherosclerosis seen on imaging:  Management per stroke protocol.  Aspirin, statin, Plavix, neurology team following and have cleared him for discharge, MRI positive for bilateral cerebellar strokes left greater than right, no significant carotid artery stenosis with imaging, patient received speech therapy, PT, OT.  I recommend acute rehab for aggressive cognitive therapy and continued PT OT.     Tobacco use: Cessation counseled.  Reportedly only smoking a few cigarettes a day.    Agrees to quit.  Smoking cessation is essential for further stroke prevention.     Essential hypertension: Initially held lisinopril to allow for permissive hypertension following ischemic stroke.  Can restart tomorrow     Stage III chronic kidney disease: BMP currently pending.  Plan to monitor renal function while hospitalized.  Baseline creatinine ranges from 1.5-1.7.  Remains at baseline.  Needs primary care follow-up after rehab discharge     Chronic pain, neuropathy: Decrease gabapentin dose for now.  Monitor mental status.  Decreased Percocet dose.  Currently doing well and received careful monitoring and supportive care.     Hyperlipidemia with hypertriglyceridemia: Triglycerides 228, LDL 83 with preferred goal less than 70.  Atorvastatin initiated     Gastroesophageal reflux disease: H2 blocker.  Stable without current symptoms     Depression: Wife reports patient has been off his medication.    Now started low-dose Lexapro for now as SSRIs can be beneficial in stroke recovery.  Patient tolerating it well, symptoms stable.  Recommend primary care follow-up for further monitoring and management.     Chest pain and minimally elevated troponin: Now resolved no ischemic  changes on reviewed EKG.  Reports remote negative stress test.  Repeat stress test this hospitalization negative.  No further chest pain.     Borderline low folic acid level of 6.5: Folic acid started.     Continue folic replacement at discharge.    At the time of discharge patient was told to take all medications as prescribed, keep all follow-up appointments, and call their doctor or return to the hospital with any worsening or concerning symptoms.    Please note that dragon voice recognition software was used to create this note and that transcription errors are possible.        Discharge Follow Up Recommendations for outpatient labs/diagnostics:  Recommend primary care follow-up in 1 week.    Day of Discharge     HPI:   Speech perhaps further improved he says.  Still has intermittent mild headache.  Numbness improved.  Notes he is still little forgetful.  Says he is agreeable to go to [facility].  No other new complaints.       Review of Systems  No current fevers or chills  No current shortness of breath or cough  No current nausea, vomiting, or diarrhea  No current chest pain or palpitations    Vital Signs:   Temp:  [98.1 °F (36.7 °C)-98.3 °F (36.8 °C)] 98.3 °F (36.8 °C)  Heart Rate:  [61-75] 61  Resp:  [18] 18  BP: (115-135)/() 117/104     Physical Exam:  Constitutional:Awake, alert  HENT: NCAT, mucous membranes moist, neck supple  Respiratory: Clear to auscultation bilaterally, respiratory effort normal, nonlabored breathing   Cardiovascular: RRR, normal radial pulses  Gastrointestinal: Positive bowel sounds, soft, nontender, nondistended  Musculoskeletal: Normal musculature for age, no lower extremity edema, BMI 26  Psychiatric: Appropriate affect, cooperative, conversational  Neurologic: Speech mostly clear with much less aphasia, follows commands well, somewhat poor historian but oriented  Skin: No rashes or jaundice, warm    Pertinent  and/or Most Recent Results     Results from last 7 days    Lab Units 03/25/20  0429 03/24/20  0401 03/23/20  0451 03/22/20  1137 03/22/20  1125 03/22/20  1120   WBC 10*3/mm3  --  8.97 8.70 10.17  --   --    HEMOGLOBIN g/dL  --  15.9 15.8 16.3  --   --    HEMOGLOBIN, POC g/dL  --   --   --   --   --  16.3   HEMATOCRIT %  --  46.4 46.9 48.6  --   --    HEMATOCRIT POC %  --   --   --   --   --  48   PLATELETS 10*3/mm3  --  265 241 261  --   --    SODIUM mmol/L 140 138 131* 138  --   --    POTASSIUM mmol/L 4.0 4.2 4.1 4.7  --   --    CHLORIDE mmol/L 104 104 99 102  --   --    CO2 mmol/L 21.0* 21.0* 20.0* 23.0  --   --    BUN mg/dL 22* 18 18 17  --   --    CREATININE mg/dL 1.62* 1.68* 1.55* 1.58* 1.70*  --    GLUCOSE mg/dL 100* 88 94 97  --   --    CALCIUM mg/dL 8.9 9.2 9.0 9.7  --   --      Results from last 7 days   Lab Units 03/22/20  1137 03/22/20  1119   ALT (SGPT) U/L 27  --    AST (SGOT) U/L 27  --    PROTIME seconds  --  13.3   INR   --  1.1   APTT seconds 34.6  --      Results from last 7 days   Lab Units 03/23/20  0451   CHOLESTEROL mg/dL 157   TRIGLYCERIDES mg/dL 228*   HDL CHOL mg/dL 28*     Results from last 7 days   Lab Units 03/23/20  0451 03/22/20  1137   TSH uIU/mL 1.420  --    HEMOGLOBIN A1C % 5.40  --    TROPONIN T ng/mL 0.033* 0.070*       Brief Urine Lab Results     None          Microbiology Results Abnormal     None          Imaging Results (All)     Procedure Component Value Units Date/Time    MRI Brain Without Contrast [[id number]] Collected:  03/22/20 1532     Updated:  03/22/20 5647    Narrative:       EXAMINATION: MRI BRAIN WO CONTRAST - 03/22/2020     INDICATION: Confusion, difficult words and speech, unsteady gait, off  balance. Evaluate for stroke.     I63.9-Cerebral infarction, unspecified      TECHNIQUE: Multiplanar MRI of the brain without intravenous contrast.     COMPARISON: None.     FINDINGS: Abnormal restriction throughout the bilateral cerebral  hemispheres. Multifocal appearance greatest region of fairly confluent  restriction in the  left temporoparietal region as seen on perfusion and  prior imaging same day consistent with acute infarctions. There is  abnormal signal in the left temporoparietal region of edema and sulcal  effacement as seen on prior imaging is well without midline shift or  hydrocephalus. Mild background chronic small vessel ischemic disease  findings. Cervicomedullary junction widely patent. Pituitary and sella  within normal limits. Globes and orbits retain normal T2 signal  characteristics. Visualized paranasal sinuses and mastoid air cells are  grossly clear and well-pneumatized. No cerebellopontine angle mass  lesion.       Impression:       Abnormal restriction throughout the bilateral cerebral  hemispheres. Multifocal appearance greatest region of fairly confluent  restriction in the left temporoparietal region as seen on perfusion and  prior imaging same day consistent with acute multifocal infarctions of  concern for embolic etiology given bilateral and multifocal  distribution. No midline shift or hydrocephalus.     DICTATED:   03/22/2020  EDITED/ls :   03/22/2020          This report was finalized on 3/22/2020 6:56 PM by Dr. Kenneth Durand.       XR Chest 1 View [[id number]] Collected:  03/22/20 1348     Updated:  03/22/20 1858    Narrative:          EXAMINATION: XR CHEST 1 VW - 03/22/2020     INDICATION: Stroke protocol. I63.9-Cerebral infarction, unspecified.      COMPARISON: None.     FINDINGS: Cardiac size borderline enlarged with trace central vascular  congestion without focal consolidation, overt edema or effusion. No  pneumothorax.       Impression:       Borderline cardiomegaly with trace central vascular  congestion, however no overt edema or consolidation of acute parenchymal  process. No pleural effusion.     DICTATED:   03/22/2020  EDITED/ls :   03/22/2020      This report was finalized on 3/22/2020 6:55 PM by Dr. Kenneth Durand.       CT Angiogram Neck [[id number]] Collected:  03/22/20 1138     Updated:   03/22/20 1230    Narrative:       EXAMINATION: CT ANGIOGRAM NECK, CT ANGIOGRAM HEAD - 03/22/2020     INDICATION: CVA, confusion, abnormal CT head.      TECHNIQUE: CT angiogram head and neck with intravenous contrast. 2D and  3D reconstructions performed.     The radiation dose reduction device was turned on for each scan per the  ALARA (As Low as Reasonably Achievable) protocol.     COMPARISON: CT head noncontrast and CT cerebral perfusion performed  concurrently     FINDINGS:      CTA NECK: Normal 3-vessel arch with patent great vessel origins.  Proximal subclavian arteries are patent. Vertebral arteries demonstrate  grossly symmetric caliber without focal severe stenosis, aneurysm or  occlusion through the cervical segments. Carotids demonstrate grossly  normal course and branching pattern with only minimal atherosclerotic  involvement noncalcified plaque formations at the bifurcations with less  than 10% right and 10% left luminal narrowing as measured by NASCET  criteria with distal internal carotid arteries patent to the  intracranial segments as discussed further below in the dedicated CTA  head portion. Cervical soft tissues unremarkable and without bulky  cervical adenopathy or abnormality of the lung apices.     CTA HEAD: Distal internal carotid arteries demonstrate minimal  atherosclerotic involvement including minimal calcific disease producing  mild luminal stenosis without high-grade stenosis, aneurysm or  occlusion. Anterior cerebral arteries are patent without hemodynamically  significant stenosis, aneurysm or occlusion. Middle cerebral arteries  without large vessel occlusion, however mild irregularities in the  bilateral M3 and M4 segments, left greater than right without distinct  occlusion evident. Vertebrobasilar system and posterior cerebral  arteries are patent without hemodynamically significant stenosis,  aneurysm or occlusion. Visualized venous structures including superior  sagittal sinus  patent.       Impression:       No large vessel occlusion including only minimal  atherosclerotic involvement of the carotid bifurcations, however within  the MCA distributions bilaterally, there are mild to moderate  irregularities concerning for atherosclerotic involvement without  distinct occlusion left greater than right of the M3 and M4 segments in  particular.     DICTATED:   03/22/2020  EDITED/ls :   03/22/2020         This report was finalized on 3/22/2020 12:27 PM by Dr. Kenneth Durand.       CT Cerebral Perfusion With & Without Contrast [[id number]] Collected:  03/22/20 1134     Updated:  03/22/20 1230    Narrative:       EXAMINATION: CT CEREBRAL PERFUSION WWO CONTRAST - 03/22/2020     INDICATION: CVA, confusion, abnormal CT head.     TECHNIQUE: CT cerebral perfusion with and without intravenous contrast.  Multiple parametric maps including mean transit time, time to drain,  cerebral blood flow and cerebral blood volume performed.     The radiation dose reduction device was turned on for each scan per the  ALARA (As Low as Reasonably Achievable) protocol.     COMPARISON: CT stroke head noncontrast performed immediately prior.     FINDINGS: Abnormal perfusion with prolongation of mean transit time and  time to drain within the left frontoparietal region distal left MCA  distribution with decreased cerebral blood flow, however cerebral blood  volume preserved consistent with reversible ischemia and no core infarct  element.       Impression:       Reversible ischemia within the left posterior MCA  distribution without evidence for core infarct component.     DICTATED:   03/22/2020  EDITED/ls :   03/22/2020      This report was finalized on 3/22/2020 12:27 PM by Dr. Kenneth Durand.       CT Angiogram Head [[id number]] Collected:  03/22/20 1138     Updated:  03/22/20 1230    Narrative:       EXAMINATION: CT ANGIOGRAM NECK, CT ANGIOGRAM HEAD - 03/22/2020     INDICATION: CVA, confusion, abnormal CT head.       TECHNIQUE: CT angiogram head and neck with intravenous contrast. 2D and  3D reconstructions performed.     The radiation dose reduction device was turned on for each scan per the  ALARA (As Low as Reasonably Achievable) protocol.     COMPARISON: CT head noncontrast and CT cerebral perfusion performed  concurrently     FINDINGS:      CTA NECK: Normal 3-vessel arch with patent great vessel origins.  Proximal subclavian arteries are patent. Vertebral arteries demonstrate  grossly symmetric caliber without focal severe stenosis, aneurysm or  occlusion through the cervical segments. Carotids demonstrate grossly  normal course and branching pattern with only minimal atherosclerotic  involvement noncalcified plaque formations at the bifurcations with less  than 10% right and 10% left luminal narrowing as measured by NASCET  criteria with distal internal carotid arteries patent to the  intracranial segments as discussed further below in the dedicated CTA  head portion. Cervical soft tissues unremarkable and without bulky  cervical adenopathy or abnormality of the lung apices.     CTA HEAD: Distal internal carotid arteries demonstrate minimal  atherosclerotic involvement including minimal calcific disease producing  mild luminal stenosis without high-grade stenosis, aneurysm or  occlusion. Anterior cerebral arteries are patent without hemodynamically  significant stenosis, aneurysm or occlusion. Middle cerebral arteries  without large vessel occlusion, however mild irregularities in the  bilateral M3 and M4 segments, left greater than right without distinct  occlusion evident. Vertebrobasilar system and posterior cerebral  arteries are patent without hemodynamically significant stenosis,  aneurysm or occlusion. Visualized venous structures including superior  sagittal sinus patent.       Impression:       No large vessel occlusion including only minimal  atherosclerotic involvement of the carotid bifurcations, however  within  the MCA distributions bilaterally, there are mild to moderate  irregularities concerning for atherosclerotic involvement without  distinct occlusion left greater than right of the M3 and M4 segments in  particular.     DICTATED:   03/22/2020  EDITED/ls :   03/22/2020         This report was finalized on 3/22/2020 12:27 PM by Dr. Kenneth Durand.       CT Head Without Contrast Stroke Protocol [[id number]] Collected:  03/22/20 1111     Updated:  03/22/20 1230    Narrative:       EXAMINATION: CT HEAD WO CONTRAST - 03/22/2020     INDICATION: Evaluate for stroke.     TECHNIQUE: CT head without intravenous contrast.      The radiation dose reduction device was turned on for each scan per the  ALARA (As Low as Reasonably Achievable) protocol.     COMPARISON: None.     FINDINGS: Midline structures are without midline shift or hydrocephalus,  however asymmetry of abnormal low-attenuation left frontoparietal region  of the left posterior MCA distribution concerning for acute or evolving  infarction with cortical extension and sulcal effacement in this region.  No intra-axial hemorrhage or extra-axial fluid collection. Globes and  orbits unremarkable. Visualized paranasal sinuses and mastoid cells are  grossly clear and well pneumatized. Calvarium intact.       Impression:       Abnormal low-attenuation region left frontoparietal  posterior left MCA distribution involvement extending to involve the  cortex with sulcal effacement concerning for acute or evolving  infarction without intracranial hemorrhage.     Scan performed on 03/22/2020 at 1106 hours. Scan report given to ER  physician and team and made available on 03/22/2020 at 1112 hours.     DICTATED:   03/22/2020  EDITED/ls :   03/22/2020         This report was finalized on 3/22/2020 12:27 PM by Dr. Kenneth Durand.             Results for orders placed during the hospital encounter of 03/22/20   Bilateral Carotid Duplex    Narrative · Proximal left internal carotid  artery is normal.  · Mid left internal carotid artery is normal.  · Distal left internal carotid artery is normal.  · All other left side carotid system vessels are normal.  · Proximal right internal carotid artery is normal.  · Mid right internal carotid artery is normal.  · Distal right internal carotid artery is normal.  · All other right side carotid system vessels are normal.          Results for orders placed during the hospital encounter of 03/22/20   Bilateral Carotid Duplex    Narrative · Proximal left internal carotid artery is normal.  · Mid left internal carotid artery is normal.  · Distal left internal carotid artery is normal.  · All other left side carotid system vessels are normal.  · Proximal right internal carotid artery is normal.  · Mid right internal carotid artery is normal.  · Distal right internal carotid artery is normal.  · All other right side carotid system vessels are normal.          Results for orders placed during the hospital encounter of 03/22/20   Adult Transesophageal Echo (CATY) W/ Cont if Necessary Per Protocol    Narrative · Estimated EF = 60%.  · The cardiac valves are anatomically and functionally normal.  · Saline test results are negative.          Discharge Details        Discharge Medications      New Medications      Instructions Start Date   acetaminophen 325 MG tablet  Commonly known as:  TYLENOL   650 mg, Oral, Every 4 Hours PRN      aspirin 81 MG tablet   325 mg, Oral, Daily, Coated tab   Start Date:  March 26, 2020     atorvastatin 80 MG tablet  Commonly known as:  LIPITOR   80 mg, Oral, Nightly      clopidogrel 75 MG tablet  Commonly known as:  PLAVIX   75 mg, Oral, Daily   Start Date:  March 26, 2020     docusate sodium 100 MG capsule   100 mg, Oral, 2 Times Daily PRN      escitalopram 10 MG tablet  Commonly known as:  LEXAPRO   10 mg, Oral, Daily   Start Date:  March 26, 2020     folic acid 1 MG tablet  Commonly known as:  FOLVITE   1 mg, Oral, Daily   Start Date:   March 26, 2020     oxyCODONE-acetaminophen 5-325 MG per tablet  Commonly known as:  PERCOCET  Replaces:  oxyCODONE-acetaminophen 7.5-325 MG per tablet   1 tablet, Oral, Every 6 Hours PRN         Changes to Medications      Instructions Start Date   famotidine 20 MG tablet  Commonly known as:  PEPCID  What changed:  You were already taking a medication with the same name, and this prescription was added. Make sure you understand how and when to take each.   20 mg, Oral, 2 Times Daily PRN      famotidine 20 MG tablet  Commonly known as:  PEPCID  What changed:    · medication strength  · how to take this   20 mg, Oral, Nightly      gabapentin 600 MG tablet  Commonly known as:  NEURONTIN  What changed:    · how much to take  · when to take this   300 mg, Oral, Nightly         Continue These Medications      Instructions Start Date   azelastine 0.15 % solution nasal spray  Commonly known as:  ASTEPRO   1 Squirt, Nasal, 2 Times Daily      cetirizine 10 MG tablet  Commonly known as:  zyrTEC   10 mg, Oral, Daily PRN      dicyclomine 10 MG capsule  Commonly known as:  BENTYL   10 mg, Oral, 3 Times Daily Before Meals      lisinopril 2.5 MG tablet  Commonly known as:  PRINIVIL,ZESTRIL   TAKE 1 TABLET BY MOUTH ONCE DAILY.      pantoprazole 40 MG EC tablet  Commonly known as:  PROTONIX   40 mg, Oral, Daily         Stop These Medications    oxyCODONE-acetaminophen 7.5-325 MG per tablet  Commonly known as:  PERCOCET  Replaced by:  oxyCODONE-acetaminophen 5-325 MG per tablet            Allergies   Allergen Reactions   • Augmentin [Amoxicillin-Pot Clavulanate] Itching   • Bactrim [Sulfamethoxazole-Trimethoprim] Itching         Discharge Disposition:  Rehab Facility or Unit (DC - External)    Diet:  Hospital:  Diet Order   Procedures   • Diet Regular; Thin; Cardiac, Renal       Activity:  Activity Instructions     Activity as Tolerated               CODE STATUS:    Code Status and Medical Interventions:   Ordered at: 03/22/20 5292      Level Of Support Discussed With:    Patient     Code Status:    CPR     Medical Interventions (Level of Support Prior to Arrest):    Full           Additional Instructions for the Follow-ups that You Need to Schedule     Discharge Follow-up with PCP   As directed       Currently Documented PCP:    Ozzie Castro MD    PCP Phone Number:    [phone]     Follow Up Details:  1 week after rehab discharge         Discharge Follow-up with Specialty: Follow-up with Dr. Rodriguez in outpatient neurology clinic 6-8weeks   As directed      Specialty:  Follow-up with Dr. Rodriguez in outpatient neurology clinic 6-8weeks               Time Spent on Discharge:  35 minutes    Electronically signed by Joe Rasmussen MD, 03/25/20, 11:44 AM.        Electronically signed by Joe Rasmussen MD at 03/25/20 9940

## 2020-03-25 NOTE — DISCHARGE SUMMARY
[facility] Medicine Services  DISCHARGE SUMMARY    Patient Name: Lux Mckoy  : 1970  MRN: [id number]    Date of Admission: 3/22/2020 11:10 AM  Date of Discharge:  3/25/2020  Primary Care Physician: Ozzie Castro MD    Consults: Neurology for stroke management, cardiology for CATY and chest pain    Hospital Course     Presenting Problem:   Acute ischemic stroke (CMS/HCC) [I63.9]    Active Hospital Problems    Diagnosis  POA   • **Acute ischemic stroke (CMS/HCC) [I63.9]  Yes   • Expressive aphasia [R47.01]  Yes   • Elevated troponin [R79.89]  Yes   • Cerebrovascular atherosclerosis seen on imaging [I70.90]  Yes   • Tobacco use [Z72.0]  Yes   • CKD (chronic kidney disease) stage 3, GFR 30-59 ml/min (CMS/HCC) [N18.3]  Yes   • Chronic pain [G89.29]  Yes   • Depression [F32.9]  Yes   • Dyslipidemia [E78.5]  Yes   • GERD (gastroesophageal reflux disease) [K21.9]  Yes   • Neuropathy [G62.9]  Yes   • Hypertension [I10]  Yes      Resolved Hospital Problems   No resolved problems to display.          Hospital Course:  Lux Mckoy is a 49 y.o. male with presents the hospital with word finding and speaking difficulties and right-sided numbness and was found on imaging to have acute ischemic stroke.    Patient was admitted for stroke management as per stroke protocol.  Patient had improvement with his dysarthria and his confusion also improved.  His numbness improved as well.  He was seen by neurology    Neurology noted that since the stroke was bilateral there was concern for embolic stroke and recommended CATY.  Cardiology was consulted and also with her discussion noted that patient had had some recent chest pain.  Patient did have a very minimally elevated troponin and they opted to perform a stress test and CATY.  Stress test was negative for ischemia.  CATY was negative for embolic source of stroke.  Cardiology signed off.    Neurology is recommending dual antiplatelet therapy  with aspirin and Plavix as well as atorvastatin and management of patient's other medical issues.  He has been discharged with recommendation for follow-up in neurology clinic.      Smoking cessation is essential at this time.  Further counseling today.  Patient says he will no longer smoke.  Continuing Lexapro as it appears to be helping patient's mood.  Monitoring for side effects none so so far.  Continue PT OT and speech therapy.  Renal function remained stable and at baseline.  Lab holiday tomorrow.     Acute ischemic stroke, cerebrovascular atherosclerosis seen on imaging:  Management per stroke protocol.  Aspirin, statin, Plavix, neurology team following and have cleared him for discharge, MRI positive for bilateral cerebellar strokes left greater than right, no significant carotid artery stenosis with imaging, patient received speech therapy, PT, OT.  I recommend acute rehab for aggressive cognitive therapy and continued PT OT.     Tobacco use: Cessation counseled.  Reportedly only smoking a few cigarettes a day.    Agrees to quit.  Smoking cessation is essential for further stroke prevention.     Essential hypertension: Initially held lisinopril to allow for permissive hypertension following ischemic stroke.  Can restart tomorrow     Stage III chronic kidney disease: BMP currently pending.  Plan to monitor renal function while hospitalized.  Baseline creatinine ranges from 1.5-1.7.  Remains at baseline.  Needs primary care follow-up after rehab discharge     Chronic pain, neuropathy: Decrease gabapentin dose for now.  Monitor mental status.  Decreased Percocet dose.  Currently doing well and received careful monitoring and supportive care.     Hyperlipidemia with hypertriglyceridemia: Triglycerides 228, LDL 83 with preferred goal less than 70.  Atorvastatin initiated     Gastroesophageal reflux disease: H2 blocker.  Stable without current symptoms     Depression: Wife reports patient has been off his  medication.    Now started low-dose Lexapro for now as SSRIs can be beneficial in stroke recovery.  Patient tolerating it well, symptoms stable.  Recommend primary care follow-up for further monitoring and management.     Chest pain and minimally elevated troponin: Now resolved no ischemic changes on reviewed EKG.  Reports remote negative stress test.  Repeat stress test this hospitalization negative.  No further chest pain.     Borderline low folic acid level of 6.5: Folic acid started.    Continue folic replacement at discharge.    At the time of discharge patient was told to take all medications as prescribed, keep all follow-up appointments, and call their doctor or return to the hospital with any worsening or concerning symptoms.    Please note that dragon voice recognition software was used to create this note and that transcription errors are possible.        Discharge Follow Up Recommendations for outpatient labs/diagnostics:  Recommend primary care follow-up in 1 week.    Day of Discharge     HPI:   Speech perhaps further improved he says.  Still has intermittent mild headache.  Numbness improved.  Notes he is still little forgetful.  Says he is agreeable to go to [facility].  No other new complaints.       Review of Systems  No current fevers or chills  No current shortness of breath or cough  No current nausea, vomiting, or diarrhea  No current chest pain or palpitations    Vital Signs:   Temp:  [98.1 °F (36.7 °C)-98.3 °F (36.8 °C)] 98.3 °F (36.8 °C)  Heart Rate:  [61-75] 61  Resp:  [18] 18  BP: (115-135)/() 117/104     Physical Exam:  Constitutional:Awake, alert  HENT: NCAT, mucous membranes moist, neck supple  Respiratory: Clear to auscultation bilaterally, respiratory effort normal, nonlabored breathing   Cardiovascular: RRR, normal radial pulses  Gastrointestinal: Positive bowel sounds, soft, nontender, nondistended  Musculoskeletal: Normal musculature for age, no lower extremity edema, BMI  26  Psychiatric: Appropriate affect, cooperative, conversational  Neurologic: Speech mostly clear with much less aphasia, follows commands well, somewhat poor historian but oriented  Skin: No rashes or jaundice, warm    Pertinent  and/or Most Recent Results     Results from last 7 days   Lab Units 03/25/20  0429 03/24/20  0401 03/23/20  0451 03/22/20  1137 03/22/20  1125 03/22/20  1120   WBC 10*3/mm3  --  8.97 8.70 10.17  --   --    HEMOGLOBIN g/dL  --  15.9 15.8 16.3  --   --    HEMOGLOBIN, POC g/dL  --   --   --   --   --  16.3   HEMATOCRIT %  --  46.4 46.9 48.6  --   --    HEMATOCRIT POC %  --   --   --   --   --  48   PLATELETS 10*3/mm3  --  265 241 261  --   --    SODIUM mmol/L 140 138 131* 138  --   --    POTASSIUM mmol/L 4.0 4.2 4.1 4.7  --   --    CHLORIDE mmol/L 104 104 99 102  --   --    CO2 mmol/L 21.0* 21.0* 20.0* 23.0  --   --    BUN mg/dL 22* 18 18 17  --   --    CREATININE mg/dL 1.62* 1.68* 1.55* 1.58* 1.70*  --    GLUCOSE mg/dL 100* 88 94 97  --   --    CALCIUM mg/dL 8.9 9.2 9.0 9.7  --   --      Results from last 7 days   Lab Units 03/22/20  1137 03/22/20  1119   ALT (SGPT) U/L 27  --    AST (SGOT) U/L 27  --    PROTIME seconds  --  13.3   INR   --  1.1   APTT seconds 34.6  --      Results from last 7 days   Lab Units 03/23/20  0451   CHOLESTEROL mg/dL 157   TRIGLYCERIDES mg/dL 228*   HDL CHOL mg/dL 28*     Results from last 7 days   Lab Units 03/23/20  0451 03/22/20  1137   TSH uIU/mL 1.420  --    HEMOGLOBIN A1C % 5.40  --    TROPONIN T ng/mL 0.033* 0.070*       Brief Urine Lab Results     None          Microbiology Results Abnormal     None          Imaging Results (All)     Procedure Component Value Units Date/Time    MRI Brain Without Contrast [[id number]] Collected:  03/22/20 1532     Updated:  03/22/20 1859    Narrative:       EXAMINATION: MRI BRAIN WO CONTRAST - 03/22/2020     INDICATION: Confusion, difficult words and speech, unsteady gait, off  balance. Evaluate for stroke.      I63.9-Cerebral infarction, unspecified      TECHNIQUE: Multiplanar MRI of the brain without intravenous contrast.     COMPARISON: None.     FINDINGS: Abnormal restriction throughout the bilateral cerebral  hemispheres. Multifocal appearance greatest region of fairly confluent  restriction in the left temporoparietal region as seen on perfusion and  prior imaging same day consistent with acute infarctions. There is  abnormal signal in the left temporoparietal region of edema and sulcal  effacement as seen on prior imaging is well without midline shift or  hydrocephalus. Mild background chronic small vessel ischemic disease  findings. Cervicomedullary junction widely patent. Pituitary and sella  within normal limits. Globes and orbits retain normal T2 signal  characteristics. Visualized paranasal sinuses and mastoid air cells are  grossly clear and well-pneumatized. No cerebellopontine angle mass  lesion.       Impression:       Abnormal restriction throughout the bilateral cerebral  hemispheres. Multifocal appearance greatest region of fairly confluent  restriction in the left temporoparietal region as seen on perfusion and  prior imaging same day consistent with acute multifocal infarctions of  concern for embolic etiology given bilateral and multifocal  distribution. No midline shift or hydrocephalus.     DICTATED:   03/22/2020  EDITED/ls :   03/22/2020          This report was finalized on 3/22/2020 6:56 PM by Dr. Kenneth Durand.       XR Chest 1 View [[id number]] Collected:  03/22/20 1348     Updated:  03/22/20 1858    Narrative:          EXAMINATION: XR CHEST 1 VW - 03/22/2020     INDICATION: Stroke protocol. I63.9-Cerebral infarction, unspecified.      COMPARISON: None.     FINDINGS: Cardiac size borderline enlarged with trace central vascular  congestion without focal consolidation, overt edema or effusion. No  pneumothorax.       Impression:       Borderline cardiomegaly with trace central  vascular  congestion, however no overt edema or consolidation of acute parenchymal  process. No pleural effusion.     DICTATED:   03/22/2020  EDITED/ls :   03/22/2020      This report was finalized on 3/22/2020 6:55 PM by Dr. Kenneth Durand.       CT Angiogram Neck [[id number]] Collected:  03/22/20 1138     Updated:  03/22/20 1230    Narrative:       EXAMINATION: CT ANGIOGRAM NECK, CT ANGIOGRAM HEAD - 03/22/2020     INDICATION: CVA, confusion, abnormal CT head.      TECHNIQUE: CT angiogram head and neck with intravenous contrast. 2D and  3D reconstructions performed.     The radiation dose reduction device was turned on for each scan per the  ALARA (As Low as Reasonably Achievable) protocol.     COMPARISON: CT head noncontrast and CT cerebral perfusion performed  concurrently     FINDINGS:      CTA NECK: Normal 3-vessel arch with patent great vessel origins.  Proximal subclavian arteries are patent. Vertebral arteries demonstrate  grossly symmetric caliber without focal severe stenosis, aneurysm or  occlusion through the cervical segments. Carotids demonstrate grossly  normal course and branching pattern with only minimal atherosclerotic  involvement noncalcified plaque formations at the bifurcations with less  than 10% right and 10% left luminal narrowing as measured by NASCET  criteria with distal internal carotid arteries patent to the  intracranial segments as discussed further below in the dedicated CTA  head portion. Cervical soft tissues unremarkable and without bulky  cervical adenopathy or abnormality of the lung apices.     CTA HEAD: Distal internal carotid arteries demonstrate minimal  atherosclerotic involvement including minimal calcific disease producing  mild luminal stenosis without high-grade stenosis, aneurysm or  occlusion. Anterior cerebral arteries are patent without hemodynamically  significant stenosis, aneurysm or occlusion. Middle cerebral arteries  without large vessel occlusion, however mild  irregularities in the  bilateral M3 and M4 segments, left greater than right without distinct  occlusion evident. Vertebrobasilar system and posterior cerebral  arteries are patent without hemodynamically significant stenosis,  aneurysm or occlusion. Visualized venous structures including superior  sagittal sinus patent.       Impression:       No large vessel occlusion including only minimal  atherosclerotic involvement of the carotid bifurcations, however within  the MCA distributions bilaterally, there are mild to moderate  irregularities concerning for atherosclerotic involvement without  distinct occlusion left greater than right of the M3 and M4 segments in  particular.     DICTATED:   03/22/2020  EDITED/ls :   03/22/2020         This report was finalized on 3/22/2020 12:27 PM by Dr. Kenneth Durand.       CT Cerebral Perfusion With & Without Contrast [[id number]] Collected:  03/22/20 1134     Updated:  03/22/20 1230    Narrative:       EXAMINATION: CT CEREBRAL PERFUSION WWO CONTRAST - 03/22/2020     INDICATION: CVA, confusion, abnormal CT head.     TECHNIQUE: CT cerebral perfusion with and without intravenous contrast.  Multiple parametric maps including mean transit time, time to drain,  cerebral blood flow and cerebral blood volume performed.     The radiation dose reduction device was turned on for each scan per the  ALARA (As Low as Reasonably Achievable) protocol.     COMPARISON: CT stroke head noncontrast performed immediately prior.     FINDINGS: Abnormal perfusion with prolongation of mean transit time and  time to drain within the left frontoparietal region distal left MCA  distribution with decreased cerebral blood flow, however cerebral blood  volume preserved consistent with reversible ischemia and no core infarct  element.       Impression:       Reversible ischemia within the left posterior MCA  distribution without evidence for core infarct component.     DICTATED:   03/22/2020  EDITED/ls :    03/22/2020      This report was finalized on 3/22/2020 12:27 PM by Dr. Kenneth Durand.       CT Angiogram Head [[id number]] Collected:  03/22/20 1138     Updated:  03/22/20 1230    Narrative:       EXAMINATION: CT ANGIOGRAM NECK, CT ANGIOGRAM HEAD - 03/22/2020     INDICATION: CVA, confusion, abnormal CT head.      TECHNIQUE: CT angiogram head and neck with intravenous contrast. 2D and  3D reconstructions performed.     The radiation dose reduction device was turned on for each scan per the  ALARA (As Low as Reasonably Achievable) protocol.     COMPARISON: CT head noncontrast and CT cerebral perfusion performed  concurrently     FINDINGS:      CTA NECK: Normal 3-vessel arch with patent great vessel origins.  Proximal subclavian arteries are patent. Vertebral arteries demonstrate  grossly symmetric caliber without focal severe stenosis, aneurysm or  occlusion through the cervical segments. Carotids demonstrate grossly  normal course and branching pattern with only minimal atherosclerotic  involvement noncalcified plaque formations at the bifurcations with less  than 10% right and 10% left luminal narrowing as measured by NASCET  criteria with distal internal carotid arteries patent to the  intracranial segments as discussed further below in the dedicated CTA  head portion. Cervical soft tissues unremarkable and without bulky  cervical adenopathy or abnormality of the lung apices.     CTA HEAD: Distal internal carotid arteries demonstrate minimal  atherosclerotic involvement including minimal calcific disease producing  mild luminal stenosis without high-grade stenosis, aneurysm or  occlusion. Anterior cerebral arteries are patent without hemodynamically  significant stenosis, aneurysm or occlusion. Middle cerebral arteries  without large vessel occlusion, however mild irregularities in the  bilateral M3 and M4 segments, left greater than right without distinct  occlusion evident. Vertebrobasilar system and posterior  cerebral  arteries are patent without hemodynamically significant stenosis,  aneurysm or occlusion. Visualized venous structures including superior  sagittal sinus patent.       Impression:       No large vessel occlusion including only minimal  atherosclerotic involvement of the carotid bifurcations, however within  the MCA distributions bilaterally, there are mild to moderate  irregularities concerning for atherosclerotic involvement without  distinct occlusion left greater than right of the M3 and M4 segments in  particular.     DICTATED:   03/22/2020  EDITED/ls :   03/22/2020         This report was finalized on 3/22/2020 12:27 PM by Dr. Kenneth Durand.       CT Head Without Contrast Stroke Protocol [[id number]] Collected:  03/22/20 1111     Updated:  03/22/20 1230    Narrative:       EXAMINATION: CT HEAD WO CONTRAST - 03/22/2020     INDICATION: Evaluate for stroke.     TECHNIQUE: CT head without intravenous contrast.      The radiation dose reduction device was turned on for each scan per the  ALARA (As Low as Reasonably Achievable) protocol.     COMPARISON: None.     FINDINGS: Midline structures are without midline shift or hydrocephalus,  however asymmetry of abnormal low-attenuation left frontoparietal region  of the left posterior MCA distribution concerning for acute or evolving  infarction with cortical extension and sulcal effacement in this region.  No intra-axial hemorrhage or extra-axial fluid collection. Globes and  orbits unremarkable. Visualized paranasal sinuses and mastoid cells are  grossly clear and well pneumatized. Calvarium intact.       Impression:       Abnormal low-attenuation region left frontoparietal  posterior left MCA distribution involvement extending to involve the  cortex with sulcal effacement concerning for acute or evolving  infarction without intracranial hemorrhage.     Scan performed on 03/22/2020 at 1106 hours. Scan report given to ER  physician and team and made available on  03/22/2020 at 1112 hours.     DICTATED:   03/22/2020  EDITED/ls :   03/22/2020         This report was finalized on 3/22/2020 12:27 PM by Dr. Kenneth Durand.             Results for orders placed during the hospital encounter of 03/22/20   Bilateral Carotid Duplex    Narrative · Proximal left internal carotid artery is normal.  · Mid left internal carotid artery is normal.  · Distal left internal carotid artery is normal.  · All other left side carotid system vessels are normal.  · Proximal right internal carotid artery is normal.  · Mid right internal carotid artery is normal.  · Distal right internal carotid artery is normal.  · All other right side carotid system vessels are normal.          Results for orders placed during the hospital encounter of 03/22/20   Bilateral Carotid Duplex    Narrative · Proximal left internal carotid artery is normal.  · Mid left internal carotid artery is normal.  · Distal left internal carotid artery is normal.  · All other left side carotid system vessels are normal.  · Proximal right internal carotid artery is normal.  · Mid right internal carotid artery is normal.  · Distal right internal carotid artery is normal.  · All other right side carotid system vessels are normal.          Results for orders placed during the hospital encounter of 03/22/20   Adult Transesophageal Echo (CATY) W/ Cont if Necessary Per Protocol    Narrative · Estimated EF = 60%.  · The cardiac valves are anatomically and functionally normal.  · Saline test results are negative.          Discharge Details        Discharge Medications      New Medications      Instructions Start Date   acetaminophen 325 MG tablet  Commonly known as:  TYLENOL   650 mg, Oral, Every 4 Hours PRN      aspirin 81 MG tablet   325 mg, Oral, Daily, Coated tab   Start Date:  March 26, 2020     atorvastatin 80 MG tablet  Commonly known as:  LIPITOR   80 mg, Oral, Nightly      clopidogrel 75 MG tablet  Commonly known as:  PLAVIX   75 mg,  Oral, Daily   Start Date:  March 26, 2020     docusate sodium 100 MG capsule   100 mg, Oral, 2 Times Daily PRN      escitalopram 10 MG tablet  Commonly known as:  LEXAPRO   10 mg, Oral, Daily   Start Date:  March 26, 2020     folic acid 1 MG tablet  Commonly known as:  FOLVITE   1 mg, Oral, Daily   Start Date:  March 26, 2020     oxyCODONE-acetaminophen 5-325 MG per tablet  Commonly known as:  PERCOCET  Replaces:  oxyCODONE-acetaminophen 7.5-325 MG per tablet   1 tablet, Oral, Every 6 Hours PRN         Changes to Medications      Instructions Start Date   famotidine 20 MG tablet  Commonly known as:  PEPCID  What changed:  You were already taking a medication with the same name, and this prescription was added. Make sure you understand how and when to take each.   20 mg, Oral, 2 Times Daily PRN      famotidine 20 MG tablet  Commonly known as:  PEPCID  What changed:    · medication strength  · how to take this   20 mg, Oral, Nightly      gabapentin 600 MG tablet  Commonly known as:  NEURONTIN  What changed:    · how much to take  · when to take this   300 mg, Oral, Nightly         Continue These Medications      Instructions Start Date   azelastine 0.15 % solution nasal spray  Commonly known as:  ASTEPRO   1 Squirt, Nasal, 2 Times Daily      cetirizine 10 MG tablet  Commonly known as:  zyrTEC   10 mg, Oral, Daily PRN      dicyclomine 10 MG capsule  Commonly known as:  BENTYL   10 mg, Oral, 3 Times Daily Before Meals      lisinopril 2.5 MG tablet  Commonly known as:  PRINIVIL,ZESTRIL   TAKE 1 TABLET BY MOUTH ONCE DAILY.      pantoprazole 40 MG EC tablet  Commonly known as:  PROTONIX   40 mg, Oral, Daily         Stop These Medications    oxyCODONE-acetaminophen 7.5-325 MG per tablet  Commonly known as:  PERCOCET  Replaced by:  oxyCODONE-acetaminophen 5-325 MG per tablet            Allergies   Allergen Reactions   • Augmentin [Amoxicillin-Pot Clavulanate] Itching   • Bactrim [Sulfamethoxazole-Trimethoprim] Itching          Discharge Disposition:  Rehab Facility or Unit (DC - External)    Diet:  Hospital:  Diet Order   Procedures   • Diet Regular; Thin; Cardiac, Renal       Activity:  Activity Instructions     Activity as Tolerated               CODE STATUS:    Code Status and Medical Interventions:   Ordered at: 03/22/20 1208     Level Of Support Discussed With:    Patient     Code Status:    CPR     Medical Interventions (Level of Support Prior to Arrest):    Full           Additional Instructions for the Follow-ups that You Need to Schedule     Discharge Follow-up with PCP   As directed       Currently Documented PCP:    Ozzie Castro MD    PCP Phone Number:    [phone]     Follow Up Details:  1 week after rehab discharge         Discharge Follow-up with Specialty: Follow-up with Dr. Rodriguez in outpatient neurology clinic 6-8weeks   As directed      Specialty:  Follow-up with Dr. Rodriguez in outpatient neurology clinic 6-8weeks               Time Spent on Discharge:  35 minutes    Electronically signed by Joe Rasmussen MD, 03/25/20, 11:44 AM.

## 2020-03-25 NOTE — PROGRESS NOTES
Case Management Discharge Note      Final Note: [facility] has approval for acute rehab today. Patient/his wife hesitant to go. I spoke with Dr. Rasmussen and he has addressed this with patient/his family and plans are for stroke unit today.   [phone].      Provided Post Acute Provider List?: Yes  Post Acute Provider List: Inpatient Rehab  Delivered To: Patient  Method of Delivery: In person    Destination - Selection Complete      Service Provider Request Status Selected Services Address Phone Number Fax Number    [facility] Selected Inpatient Rehabilitation [address] [id number] [phone]       Delaney Brody, RN 3/25/2020 [id number]    Insurance approval given. Patient going to the stroke unit                 Durable Medical Equipment      No service has been selected for the patient.      Dialysis/Infusion      No service has been selected for the patient.      Home Medical Care      No service has been selected for the patient.      Therapy      No service has been selected for the patient.      Community Resources      No service has been selected for the patient.             Final Discharge Disposition Code: 62 - inpatient rehab facility

## 2020-03-31 ENCOUNTER — TELEPHONE (OUTPATIENT)
Dept: NEUROLOGY | Facility: CLINIC | Age: 50
End: 2020-03-31

## 2020-03-31 NOTE — TELEPHONE ENCOUNTER
[facility] CALLED TO REQUEST APPT FOR A HOSPITAL FOLLOW UP FOLLOW UP APPT WITH DR MAZARIEGOS. PLEASE CALL PATIENT [phone] TO MAKE APPT

## 2020-05-21 ENCOUNTER — TELEMEDICINE (OUTPATIENT)
Dept: NEUROLOGY | Facility: CLINIC | Age: 50
End: 2020-05-21

## 2020-05-21 DIAGNOSIS — I69.30 SEQUELAE, POST-STROKE: Primary | ICD-10-CM

## 2020-05-21 PROCEDURE — 99215 OFFICE O/P EST HI 40 MIN: CPT | Performed by: PSYCHIATRY & NEUROLOGY

## 2020-05-21 NOTE — PROGRESS NOTES
Subjective:    CC: Lux Mckoy is seen today via video visit in consultation at the request of the hospital for stroke    HPI:  49-year-old male with a history of chronic smoking, CKD (secondary to IgA nephropathy), chronic back pain status post 2 neck and 2 low back surgeries presents with a stroke.  As per patient's wife he started having symptoms of staggering gait with slurring of speech, confusion and right-sided numbness around 3/21.  He went to the hospital a day later where he had extensive testing.  CT scan of the head showed a hyper density in the left posterior parietal region.  CT perfusion also showed a perfusion deficit in that area.  CT angiogram of the head and neck showed mild to moderate stenosis in bilateral distal MCAs left more than right as well as atherosclerotic plaques in both carotid bifurcations with no major stenosis.  MRI brain showed bilateral embolic infarcts in multiple vascular territories with the largest one being in the left posterior parietal region.  He had a TTE andTEE both of which showed a normal EF with no valvular abnormalities or PFO as well as no mass.  He also had a stress test which showed normal cardiac perfusion.  Patient states that he had also been having chest pains for the past few months with occasional palpitations.  Troponins were mildly elevated.  He was evaluated by cardiology in the hospital who felt he could be maintained on dual antiplatelets as well as Lipitor 80 mg daily.  His lipid panel was as follows-, , LDL 83, HDL 28.  A1c was 5.4.  Patient was smoking half a pack of cigarettes every day prior to his stroke but has quit smoking now.  He underwent PT/speech therapy after discharge but has now stopped due to COVID.  As per his wife he continues to get mild memory problems and speech deficits even now.  Of note-I personally reviewed his MRI brain and Dr. Larson's note from the hospital.    The following portions of the patient's  history were reviewed today and updated as of 05/21/2020  : allergies, current medications, past family history, past medical history, past social history, past surgical history and problem list  These document will be scanned to patient's chart.      Current Outpatient Medications:   •  acetaminophen (TYLENOL) 325 MG tablet, Take 2 tablets by mouth Every 4 (Four) Hours As Needed for Mild Pain  or Headache., Disp: , Rfl:   •  aspirin 81 MG tablet, Take 4 tablets by mouth Daily. Coated tab, Disp: , Rfl:   •  atorvastatin (LIPITOR) 80 MG tablet, Take 1 tablet by mouth Every Night., Disp: , Rfl:   •  azelastine (ASTEPRO) 0.15 % solution nasal spray, 1 Squirt into each nostril 2 (two) times a day., Disp: , Rfl:   •  cetirizine (ZyrTEC) 10 MG tablet, Take 1 tablet by mouth Daily As Needed for allergies., Disp: 30 tablet, Rfl: 2  •  clopidogrel (PLAVIX) 75 MG tablet, Take 1 tablet by mouth Daily., Disp: 30 tablet, Rfl:   •  docusate sodium 100 MG capsule, Take 100 mg by mouth 2 (Two) Times a Day As Needed for Constipation., Disp: , Rfl:   •  famotidine (PEPCID) 20 MG tablet, Take 1 tablet by mouth 2 (Two) Times a Day As Needed for Heartburn., Disp: , Rfl:   •  famotidine (PEPCID) 20 MG tablet, Take 1 tablet by mouth Every Night., Disp: , Rfl:   •  folic acid (FOLVITE) 1 MG tablet, Take 1 tablet by mouth Daily., Disp: , Rfl:   •  gabapentin (NEURONTIN) 600 MG tablet, Take 0.5 tablets by mouth Every Night., Disp: 60 tablet, Rfl: 1  •  lisinopril (PRINIVIL,ZESTRIL) 2.5 MG tablet, TAKE 1 TABLET BY MOUTH ONCE DAILY., Disp: 90 tablet, Rfl: 1  •  pantoprazole (PROTONIX) 40 MG EC tablet, Take 40 mg by mouth Daily., Disp: , Rfl:    Past Medical History:   Diagnosis Date   • Dupuytren contracture     s/p bilateral hand surgery   • Dyspnea    • Kidney infection    • Pancreatitis       Past Surgical History:   Procedure Laterality Date   • CERVICAL DISCECTOMY ANTERIOR  02/2017    C6-7 at [facility]   • HAND SURGERY     •  "LUMBAR DISCECTOMY      \"Spinal\" times 2 in 2003 and 2005   • UMBILICAL HERNIA REPAIR      Dr. Diego Vasquez      Family History   Problem Relation Age of Onset   • Hypertension Other    • Other Other       Social History     Socioeconomic History   • Marital status:      Spouse name: Not on file   • Number of children: Not on file   • Years of education: Not on file   • Highest education level: Not on file   Tobacco Use   • Smoking status: Current Every Day Smoker     Packs/day: 0.50     Review of Systems   Neurological: Positive for weakness and memory problem.   All other systems reviewed and are negative.      Objective:    There were no vitals taken for this visit.    Neurology Exam:    General apperance: NAD.     Mental status: Alert, awake and oriented to time place and person.    Recent and Remote memory: Intact.    Attention span and Concentration: Normal.     Language and Speech: Intact- No dysarthria.    Fluency, Naming , Repitition and Comprehension:  Intact    Cranial Nerves:   CN II: Visual fields are full. Intact. Fundi - Normal, No papillederma, Pupils - ELISA  CN III, IV and VI: Extraocular movements are intact. Normal saccades.   CN V: Facial sensation is intact.   CN VII: Muscles of facial expression reveal no asymmetry. Intact.   CN VIII: Hearing is intact. Whispered voice intact.   CN IX and X: Palate elevates symmetrically. Intact  CN XI: Shoulder shrug is intact.   CN XII: Tongue is midline without evidence of atrophy or fasciculation.       Motor:  Intact    Co-ordination: Normal finger-to-nose, heel to shin B/L.    Rhomberg: Negative.    Gait: Normal.  Could do tandem walking today    Assessment and Plan:  1. Sequelae, post-stroke  Patient had bilateral embolic infarcts which could be due to paroxysmal atrial fibrillation.  Hence I will get a 4-week Holter monitor.  -I have asked him to continue dual antiplatelets for now as well as Lipitor 80 mg daily for goal LDL of less than 100.  " His LDL was 83 however triglycerides were 228.  -Maintain blood pressure less than 130/90  -I have asked him to carry on mental exercises such as solving crossword puzzles and reading.  He does not want to go for PT/speech therapy yet due to COVID.    - Holter Monitor - 72 Hour Up To 21 Days; Future       Return in about 3 months (around 8/21/2020).       Nani Carmnoa MD

## 2020-05-22 ENCOUNTER — TELEPHONE (OUTPATIENT)
Dept: NEUROLOGY | Facility: CLINIC | Age: 50
End: 2020-05-22

## 2020-05-22 NOTE — TELEPHONE ENCOUNTER
Dr. Carmona,    Central scheduling stated the patients insurance will not coover the monitor you ordered and that they can only do a 24 hour or a 48 hour monitor. Please advise. Thanks.

## 2020-05-26 NOTE — TELEPHONE ENCOUNTER
Informed Central scheduling pt is ok to do 48 hour monitor. Redirected referral back to CS for scheduling. Thanks.

## 2020-06-17 ENCOUNTER — HOSPITAL ENCOUNTER (OUTPATIENT)
Age: 50
End: 2020-06-17
Payer: MEDICAID

## 2020-06-17 DIAGNOSIS — N18.3: Primary | ICD-10-CM

## 2020-06-17 DIAGNOSIS — R31.9: ICD-10-CM

## 2020-06-17 DIAGNOSIS — E55.9: ICD-10-CM

## 2020-06-17 DIAGNOSIS — N02.8: ICD-10-CM

## 2020-06-17 LAB
25-OH VITAMIN D, TOTAL: 60.7 NG/ML (ref 30–100)
HCT VFR BLD CALC: 44.7 % (ref 42–52)
HGB BLD-MCNC: 15.2 G/DL (ref 14.1–18)
MCHC RBC-ENTMCNC: 34 G/DL (ref 31.8–35.4)
MCV RBC: 89.2 FL (ref 80–94)
MEAN CORPUSCULAR HEMOGLOBIN: 30.4 PG (ref 27–31.2)
PLATELET # BLD: 300 K/MM3 (ref 142–424)
RBC # BLD AUTO: 5.01 M/MM3 (ref 4.6–6.2)
WBC # BLD AUTO: 8 K/MM3 (ref 4.8–10.8)

## 2020-06-17 PROCEDURE — 83970 ASSAY OF PARATHORMONE: CPT

## 2020-06-17 PROCEDURE — 84155 ASSAY OF PROTEIN SERUM: CPT

## 2020-06-17 PROCEDURE — 82306 VITAMIN D 25 HYDROXY: CPT

## 2020-06-17 PROCEDURE — 36415 COLL VENOUS BLD VENIPUNCTURE: CPT

## 2020-06-17 PROCEDURE — 82570 ASSAY OF URINE CREATININE: CPT

## 2020-06-17 PROCEDURE — 85025 COMPLETE CBC W/AUTO DIFF WBC: CPT

## 2020-06-26 ENCOUNTER — TELEPHONE (OUTPATIENT)
Dept: NEUROLOGY | Facility: CLINIC | Age: 50
End: 2020-06-26

## 2020-06-26 NOTE — TELEPHONE ENCOUNTER
Plavix is not a blood thinner and will not protect him against atrial fibrillation.  He needs to be started on an anticoagulant such as Eliquis, Xarelto or Coumadin.  Would he like a referral to Cardiology or does he have a cardiologist?

## 2020-06-26 NOTE — TELEPHONE ENCOUNTER
----- Message from Nani Carmona MD sent at 6/25/2020  4:50 PM EDT -----  Please let the patient know that even though his heart monitor did not show atrial fibrillation it did show episodes of atrial  tachycardia with the upper chambers of the heart beat extremely fast.  I want him to see a cardiologist to know if he would be a candidate for blood thinners as that could also be the cause of his strokes.  Let me know and I will put in a referral.

## 2020-06-26 NOTE — TELEPHONE ENCOUNTER
Called pt and provided Holter monitor results per . Pt states that he is already taking Plavix 75 MG daily. Please advise.

## 2020-06-26 NOTE — TELEPHONE ENCOUNTER
Called and provided pt and pt spouse adrian information from . Pt is willing to see cardiologist. Please advise.

## 2020-06-29 DIAGNOSIS — I63.40 CEREBRAL INFARCTION DUE TO EMBOLISM OF CEREBRAL ARTERY (HCC): Primary | ICD-10-CM

## 2020-07-28 ENCOUNTER — TELEPHONE (OUTPATIENT)
Dept: NEUROLOGY | Facility: CLINIC | Age: 50
End: 2020-07-28

## 2020-07-28 NOTE — TELEPHONE ENCOUNTER
THE PT'S WIFE CALLED AND STATED HE IS HAVING  TIA E[PISODES FOR 2 WEEKS ON AND OFF . HE HAS SPEAKING REPETITIVE AND MORE FORGETFUL AND HAVING HAND TREMORS  . HIS LEGS HAVE BEEN SWOLLEN FROM HIS KNEES TO HIS ANKLES .  A LOT GOING ON SHE WOULD LIKE A CALL BACK TO DISCUSS HIS OPTIONS . [phone]

## 2020-07-28 NOTE — TELEPHONE ENCOUNTER
Spoke with both Dexter and then called his wife at his request and she has agreed to bring him to the [facility] ER and have him checked out as  recommended as he may be having more strokes.

## 2020-07-29 ENCOUNTER — APPOINTMENT (OUTPATIENT)
Dept: GENERAL RADIOLOGY | Facility: HOSPITAL | Age: 50
End: 2020-07-29

## 2020-07-29 ENCOUNTER — HOSPITAL ENCOUNTER (OUTPATIENT)
Facility: HOSPITAL | Age: 50
Discharge: HOME | End: 2020-07-31
Attending: EMERGENCY MEDICINE | Admitting: HOSPITALIST

## 2020-07-29 ENCOUNTER — APPOINTMENT (OUTPATIENT)
Dept: CT IMAGING | Facility: HOSPITAL | Age: 50
End: 2020-07-29

## 2020-07-29 ENCOUNTER — APPOINTMENT (OUTPATIENT)
Dept: MRI IMAGING | Facility: HOSPITAL | Age: 50
End: 2020-07-29

## 2020-07-29 DIAGNOSIS — R41.0 CONFUSION: ICD-10-CM

## 2020-07-29 DIAGNOSIS — R47.01 EXPRESSIVE APHASIA: Primary | ICD-10-CM

## 2020-07-29 DIAGNOSIS — N40.0 ENLARGED PROSTATE: ICD-10-CM

## 2020-07-29 DIAGNOSIS — N28.9 RENAL INSUFFICIENCY: ICD-10-CM

## 2020-07-29 DIAGNOSIS — I63.9 ACUTE ISCHEMIC STROKE (HCC): ICD-10-CM

## 2020-07-29 LAB
ALBUMIN SERPL-MCNC: 4.1 G/DL (ref 3.5–5.2)
ALBUMIN/GLOB SERPL: 1.5 G/DL
ALP SERPL-CCNC: 98 U/L (ref 39–117)
ALT SERPL W P-5'-P-CCNC: 30 U/L (ref 1–41)
ANION GAP SERPL CALCULATED.3IONS-SCNC: 10 MMOL/L (ref 5–15)
AST SERPL-CCNC: 26 U/L (ref 1–40)
BASOPHILS # BLD AUTO: 0.08 10*3/MM3 (ref 0–0.2)
BASOPHILS NFR BLD AUTO: 0.8 % (ref 0–1.5)
BILIRUB SERPL-MCNC: 0.4 MG/DL (ref 0–1.2)
BILIRUB UR QL STRIP: NEGATIVE
BUN SERPL-MCNC: 12 MG/DL (ref 6–20)
BUN/CREAT SERPL: 7.7 (ref 7–25)
CALCIUM SPEC-SCNC: 8.9 MG/DL (ref 8.6–10.5)
CHLORIDE SERPL-SCNC: 107 MMOL/L (ref 98–107)
CLARITY UR: CLEAR
CO2 SERPL-SCNC: 22 MMOL/L (ref 22–29)
COLOR UR: YELLOW
CREAT SERPL-MCNC: 1.55 MG/DL (ref 0.76–1.27)
DEPRECATED RDW RBC AUTO: 41 FL (ref 37–54)
EOSINOPHIL # BLD AUTO: 0.41 10*3/MM3 (ref 0–0.4)
EOSINOPHIL NFR BLD AUTO: 4 % (ref 0.3–6.2)
ERYTHROCYTE [DISTWIDTH] IN BLOOD BY AUTOMATED COUNT: 12.7 % (ref 12.3–15.4)
GFR SERPL CREATININE-BSD FRML MDRD: 48 ML/MIN/1.73
GLOBULIN UR ELPH-MCNC: 2.7 GM/DL
GLUCOSE BLDC GLUCOMTR-MCNC: 103 MG/DL (ref 70–130)
GLUCOSE BLDC GLUCOMTR-MCNC: 247 MG/DL (ref 70–130)
GLUCOSE SERPL-MCNC: 93 MG/DL (ref 65–99)
GLUCOSE UR STRIP-MCNC: NEGATIVE MG/DL
HCT VFR BLD AUTO: 47.6 % (ref 37.5–51)
HGB BLD-MCNC: 16.1 G/DL (ref 13–17.7)
HGB UR QL STRIP.AUTO: NEGATIVE
HOLD SPECIMEN: NORMAL
HOLD SPECIMEN: NORMAL
IMM GRANULOCYTES # BLD AUTO: 0.03 10*3/MM3 (ref 0–0.05)
IMM GRANULOCYTES NFR BLD AUTO: 0.3 % (ref 0–0.5)
KETONES UR QL STRIP: ABNORMAL
LEUKOCYTE ESTERASE UR QL STRIP.AUTO: NEGATIVE
LIPASE SERPL-CCNC: 23 U/L (ref 13–60)
LYMPHOCYTES # BLD AUTO: 2.71 10*3/MM3 (ref 0.7–3.1)
LYMPHOCYTES NFR BLD AUTO: 26.2 % (ref 19.6–45.3)
MCH RBC QN AUTO: 29.8 PG (ref 26.6–33)
MCHC RBC AUTO-ENTMCNC: 33.8 G/DL (ref 31.5–35.7)
MCV RBC AUTO: 88 FL (ref 79–97)
MONOCYTES # BLD AUTO: 1.09 10*3/MM3 (ref 0.1–0.9)
MONOCYTES NFR BLD AUTO: 10.6 % (ref 5–12)
NEUTROPHILS NFR BLD AUTO: 58.1 % (ref 42.7–76)
NEUTROPHILS NFR BLD AUTO: 6.01 10*3/MM3 (ref 1.7–7)
NITRITE UR QL STRIP: NEGATIVE
NRBC BLD AUTO-RTO: 0 /100 WBC (ref 0–0.2)
NT-PROBNP SERPL-MCNC: 109.1 PG/ML (ref 0–450)
PH UR STRIP.AUTO: <=5 [PH] (ref 5–8)
PLATELET # BLD AUTO: 264 10*3/MM3 (ref 140–450)
PMV BLD AUTO: 10.3 FL (ref 6–12)
POTASSIUM SERPL-SCNC: 4.1 MMOL/L (ref 3.5–5.2)
PROT SERPL-MCNC: 6.8 G/DL (ref 6–8.5)
PROT UR QL STRIP: NEGATIVE
RBC # BLD AUTO: 5.41 10*6/MM3 (ref 4.14–5.8)
SODIUM SERPL-SCNC: 139 MMOL/L (ref 136–145)
SP GR UR STRIP: 1.03 (ref 1–1.03)
TROPONIN T SERPL-MCNC: <0.01 NG/ML (ref 0–0.03)
UROBILINOGEN UR QL STRIP: ABNORMAL
WBC # BLD AUTO: 10.33 10*3/MM3 (ref 3.4–10.8)
WHOLE BLOOD HOLD SPECIMEN: NORMAL
WHOLE BLOOD HOLD SPECIMEN: NORMAL

## 2020-07-29 PROCEDURE — 80053 COMPREHEN METABOLIC PANEL: CPT

## 2020-07-29 PROCEDURE — 82962 GLUCOSE BLOOD TEST: CPT

## 2020-07-29 PROCEDURE — 84484 ASSAY OF TROPONIN QUANT: CPT

## 2020-07-29 PROCEDURE — 86147 CARDIOLIPIN ANTIBODY EA IG: CPT | Performed by: CLINICAL NURSE SPECIALIST

## 2020-07-29 PROCEDURE — 99244 OFF/OP CNSLTJ NEW/EST MOD 40: CPT | Performed by: CLINICAL NURSE SPECIALIST

## 2020-07-29 PROCEDURE — G0378 HOSPITAL OBSERVATION PER HR: HCPCS

## 2020-07-29 PROCEDURE — 83704 LIPOPROTEIN BLD QUAN PART: CPT | Performed by: CLINICAL NURSE SPECIALIST

## 2020-07-29 PROCEDURE — 85670 THROMBIN TIME PLASMA: CPT | Performed by: CLINICAL NURSE SPECIALIST

## 2020-07-29 PROCEDURE — 70450 CT HEAD/BRAIN W/O DYE: CPT

## 2020-07-29 PROCEDURE — 85300 ANTITHROMBIN III ACTIVITY: CPT | Performed by: CLINICAL NURSE SPECIALIST

## 2020-07-29 PROCEDURE — 83690 ASSAY OF LIPASE: CPT

## 2020-07-29 PROCEDURE — 83880 ASSAY OF NATRIURETIC PEPTIDE: CPT

## 2020-07-29 PROCEDURE — 85732 THROMBOPLASTIN TIME PARTIAL: CPT | Performed by: CLINICAL NURSE SPECIALIST

## 2020-07-29 PROCEDURE — 93005 ELECTROCARDIOGRAM TRACING: CPT | Performed by: EMERGENCY MEDICINE

## 2020-07-29 PROCEDURE — 81241 F5 GENE: CPT | Performed by: CLINICAL NURSE SPECIALIST

## 2020-07-29 PROCEDURE — 85025 COMPLETE CBC W/AUTO DIFF WBC: CPT

## 2020-07-29 PROCEDURE — 85613 RUSSELL VIPER VENOM DILUTED: CPT | Performed by: CLINICAL NURSE SPECIALIST

## 2020-07-29 PROCEDURE — 86038 ANTINUCLEAR ANTIBODIES: CPT | Performed by: CLINICAL NURSE SPECIALIST

## 2020-07-29 PROCEDURE — 81003 URINALYSIS AUTO W/O SCOPE: CPT | Performed by: NURSE PRACTITIONER

## 2020-07-29 PROCEDURE — 85705 THROMBOPLASTIN INHIBITION: CPT | Performed by: CLINICAL NURSE SPECIALIST

## 2020-07-29 PROCEDURE — 99220 PR INITIAL OBSERVATION CARE/DAY 70 MINUTES: CPT | Performed by: INTERNAL MEDICINE

## 2020-07-29 PROCEDURE — 80061 LIPID PANEL: CPT | Performed by: CLINICAL NURSE SPECIALIST

## 2020-07-29 PROCEDURE — 93005 ELECTROCARDIOGRAM TRACING: CPT

## 2020-07-29 PROCEDURE — 85303 CLOT INHIBIT PROT C ACTIVITY: CPT | Performed by: CLINICAL NURSE SPECIALIST

## 2020-07-29 PROCEDURE — 99284 EMERGENCY DEPT VISIT MOD MDM: CPT

## 2020-07-29 PROCEDURE — 85306 CLOT INHIBIT PROT S FREE: CPT | Performed by: CLINICAL NURSE SPECIALIST

## 2020-07-29 PROCEDURE — 71045 X-RAY EXAM CHEST 1 VIEW: CPT

## 2020-07-29 PROCEDURE — 84484 ASSAY OF TROPONIN QUANT: CPT | Performed by: EMERGENCY MEDICINE

## 2020-07-29 PROCEDURE — 70551 MRI BRAIN STEM W/O DYE: CPT

## 2020-07-29 PROCEDURE — 84484 ASSAY OF TROPONIN QUANT: CPT | Performed by: NURSE PRACTITIONER

## 2020-07-29 PROCEDURE — 82164 ANGIOTENSIN I ENZYME TEST: CPT | Performed by: CLINICAL NURSE SPECIALIST

## 2020-07-29 PROCEDURE — 86146 BETA-2 GLYCOPROTEIN ANTIBODY: CPT | Performed by: CLINICAL NURSE SPECIALIST

## 2020-07-29 RX ORDER — PANTOPRAZOLE SODIUM 40 MG/1
40 TABLET, DELAYED RELEASE ORAL
Status: DISCONTINUED | OUTPATIENT
Start: 2020-07-29 | End: 2020-07-31 | Stop reason: HOSPADM

## 2020-07-29 RX ORDER — SODIUM CHLORIDE 0.9 % (FLUSH) 0.9 %
10 SYRINGE (ML) INJECTION EVERY 12 HOURS SCHEDULED
Status: DISCONTINUED | OUTPATIENT
Start: 2020-07-29 | End: 2020-07-31 | Stop reason: HOSPADM

## 2020-07-29 RX ORDER — ASPIRIN 81 MG/1
324 TABLET, CHEWABLE ORAL ONCE
Status: DISCONTINUED | OUTPATIENT
Start: 2020-07-29 | End: 2020-07-31

## 2020-07-29 RX ORDER — ATORVASTATIN CALCIUM 40 MG/1
80 TABLET, FILM COATED ORAL NIGHTLY
Status: DISCONTINUED | OUTPATIENT
Start: 2020-07-29 | End: 2020-07-29

## 2020-07-29 RX ORDER — FOLIC ACID 1 MG/1
1 TABLET ORAL DAILY
Status: DISCONTINUED | OUTPATIENT
Start: 2020-07-30 | End: 2020-07-31 | Stop reason: HOSPADM

## 2020-07-29 RX ORDER — NICOTINE 21 MG/24HR
1 PATCH, TRANSDERMAL 24 HOURS TRANSDERMAL
Status: DISCONTINUED | OUTPATIENT
Start: 2020-07-30 | End: 2020-07-31 | Stop reason: HOSPADM

## 2020-07-29 RX ORDER — ATORVASTATIN CALCIUM 40 MG/1
80 TABLET, FILM COATED ORAL NIGHTLY
Status: DISCONTINUED | OUTPATIENT
Start: 2020-07-29 | End: 2020-07-31 | Stop reason: HOSPADM

## 2020-07-29 RX ORDER — SODIUM CHLORIDE 0.9 % (FLUSH) 0.9 %
10 SYRINGE (ML) INJECTION AS NEEDED
Status: DISCONTINUED | OUTPATIENT
Start: 2020-07-29 | End: 2020-07-31 | Stop reason: HOSPADM

## 2020-07-29 RX ORDER — ASPIRIN 81 MG/1
81 TABLET ORAL DAILY
Status: DISCONTINUED | OUTPATIENT
Start: 2020-07-30 | End: 2020-07-31 | Stop reason: HOSPADM

## 2020-07-29 RX ORDER — GABAPENTIN 300 MG/1
300 CAPSULE ORAL NIGHTLY
Status: DISCONTINUED | OUTPATIENT
Start: 2020-07-29 | End: 2020-07-31 | Stop reason: HOSPADM

## 2020-07-29 RX ORDER — CLOPIDOGREL BISULFATE 75 MG/1
75 TABLET ORAL DAILY
Status: DISCONTINUED | OUTPATIENT
Start: 2020-07-30 | End: 2020-07-30

## 2020-07-29 RX ORDER — ACETAMINOPHEN 325 MG/1
650 TABLET ORAL EVERY 4 HOURS PRN
Status: DISCONTINUED | OUTPATIENT
Start: 2020-07-29 | End: 2020-07-31 | Stop reason: HOSPADM

## 2020-07-29 RX ORDER — NICOTINE 21 MG/24HR
1 PATCH, TRANSDERMAL 24 HOURS TRANSDERMAL ONCE AS NEEDED
Status: DISCONTINUED | OUTPATIENT
Start: 2020-07-29 | End: 2020-07-30

## 2020-07-29 RX ORDER — LISINOPRIL 5 MG/1
2.5 TABLET ORAL DAILY
Status: DISCONTINUED | OUTPATIENT
Start: 2020-07-30 | End: 2020-07-30

## 2020-07-29 RX ADMIN — ATORVASTATIN CALCIUM 80 MG: 40 TABLET, FILM COATED ORAL at 20:17

## 2020-07-29 RX ADMIN — SODIUM CHLORIDE, PRESERVATIVE FREE 10 ML: 5 INJECTION INTRAVENOUS at 20:18

## 2020-07-29 RX ADMIN — PANTOPRAZOLE SODIUM 40 MG: 40 TABLET, DELAYED RELEASE ORAL at 20:17

## 2020-07-29 RX ADMIN — GABAPENTIN 300 MG: 300 CAPSULE ORAL at 20:18

## 2020-07-30 ENCOUNTER — APPOINTMENT (OUTPATIENT)
Dept: NEUROLOGY | Facility: HOSPITAL | Age: 50
End: 2020-07-30

## 2020-07-30 LAB
ANION GAP SERPL CALCULATED.3IONS-SCNC: 8 MMOL/L (ref 5–15)
BUN SERPL-MCNC: 14 MG/DL (ref 6–20)
BUN/CREAT SERPL: 8.5 (ref 7–25)
CALCIUM SPEC-SCNC: 8.7 MG/DL (ref 8.6–10.5)
CHLORIDE SERPL-SCNC: 108 MMOL/L (ref 98–107)
CHOLEST SERPL-MCNC: 116 MG/DL (ref 0–200)
CO2 SERPL-SCNC: 25 MMOL/L (ref 22–29)
CREAT SERPL-MCNC: 1.65 MG/DL (ref 0.76–1.27)
F5 GENE MUT ANL BLD/T: NORMAL
GFR SERPL CREATININE-BSD FRML MDRD: 45 ML/MIN/1.73
GLUCOSE BLDC GLUCOMTR-MCNC: 99 MG/DL (ref 70–130)
GLUCOSE SERPL-MCNC: 96 MG/DL (ref 65–99)
HBA1C MFR BLD: 5 % (ref 4.8–5.6)
HDLC SERPL-MCNC: 28 MG/DL (ref 40–60)
LDLC SERPL CALC-MCNC: 35 MG/DL (ref 0–100)
LDLC/HDLC SERPL: 1.25 {RATIO}
POTASSIUM SERPL-SCNC: 3.9 MMOL/L (ref 3.5–5.2)
SARS-COV-2 RNA RESP QL NAA+PROBE: NOT DETECTED
SODIUM SERPL-SCNC: 141 MMOL/L (ref 136–145)
TRIGL SERPL-MCNC: 265 MG/DL (ref 0–150)
TSH SERPL DL<=0.05 MIU/L-ACNC: 3.22 UIU/ML (ref 0.27–4.2)
VLDLC SERPL-MCNC: 53 MG/DL

## 2020-07-30 PROCEDURE — 25010000003 LIDOCAINE 1 % SOLUTION: Performed by: INTERNAL MEDICINE

## 2020-07-30 PROCEDURE — 80061 LIPID PANEL: CPT | Performed by: NURSE PRACTITIONER

## 2020-07-30 PROCEDURE — G0378 HOSPITAL OBSERVATION PER HR: HCPCS

## 2020-07-30 PROCEDURE — 33285 INSJ SUBQ CAR RHYTHM MNTR: CPT | Performed by: INTERNAL MEDICINE

## 2020-07-30 PROCEDURE — 83036 HEMOGLOBIN GLYCOSYLATED A1C: CPT | Performed by: NURSE PRACTITIONER

## 2020-07-30 PROCEDURE — 92523 SPEECH SOUND LANG COMPREHEN: CPT

## 2020-07-30 PROCEDURE — 97165 OT EVAL LOW COMPLEX 30 MIN: CPT

## 2020-07-30 PROCEDURE — 97161 PT EVAL LOW COMPLEX 20 MIN: CPT

## 2020-07-30 PROCEDURE — 84443 ASSAY THYROID STIM HORMONE: CPT | Performed by: INTERNAL MEDICINE

## 2020-07-30 PROCEDURE — 99244 OFF/OP CNSLTJ NEW/EST MOD 40: CPT | Performed by: INTERNAL MEDICINE

## 2020-07-30 PROCEDURE — 87635 SARS-COV-2 COVID-19 AMP PRB: CPT | Performed by: INTERNAL MEDICINE

## 2020-07-30 PROCEDURE — 99225 PR SBSQ OBSERVATION CARE/DAY 25 MINUTES: CPT | Performed by: HOSPITALIST

## 2020-07-30 PROCEDURE — 25010000003 CEFAZOLIN IN DEXTROSE 2-4 GM/100ML-% SOLUTION: Performed by: INTERNAL MEDICINE

## 2020-07-30 PROCEDURE — C1764 EVENT RECORDER, CARDIAC: HCPCS | Performed by: INTERNAL MEDICINE

## 2020-07-30 PROCEDURE — 95816 EEG AWAKE AND DROWSY: CPT

## 2020-07-30 PROCEDURE — 99214 OFFICE O/P EST MOD 30 MIN: CPT | Performed by: PSYCHIATRY & NEUROLOGY

## 2020-07-30 PROCEDURE — 25010000002 FENTANYL CITRATE (PF) 100 MCG/2ML SOLUTION: Performed by: INTERNAL MEDICINE

## 2020-07-30 PROCEDURE — 25010000002 MIDAZOLAM PER 1 MG: Performed by: INTERNAL MEDICINE

## 2020-07-30 PROCEDURE — 82962 GLUCOSE BLOOD TEST: CPT

## 2020-07-30 PROCEDURE — 80048 BASIC METABOLIC PNL TOTAL CA: CPT | Performed by: INTERNAL MEDICINE

## 2020-07-30 DEVICE — ICM LP/RECRD REVEAL LINQ MEDTRONIC: Type: IMPLANTABLE DEVICE | Status: FUNCTIONAL

## 2020-07-30 RX ORDER — DIVALPROEX SODIUM 250 MG/1
250 TABLET, DELAYED RELEASE ORAL EVERY 8 HOURS SCHEDULED
Status: DISCONTINUED | OUTPATIENT
Start: 2020-07-30 | End: 2020-07-31 | Stop reason: HOSPADM

## 2020-07-30 RX ORDER — LIDOCAINE HYDROCHLORIDE 10 MG/ML
INJECTION, SOLUTION INFILTRATION; PERINEURAL AS NEEDED
Status: DISCONTINUED | OUTPATIENT
Start: 2020-07-30 | End: 2020-07-30 | Stop reason: HOSPADM

## 2020-07-30 RX ORDER — FENTANYL CITRATE 50 UG/ML
INJECTION, SOLUTION INTRAMUSCULAR; INTRAVENOUS AS NEEDED
Status: DISCONTINUED | OUTPATIENT
Start: 2020-07-30 | End: 2020-07-30 | Stop reason: HOSPADM

## 2020-07-30 RX ORDER — LISINOPRIL 5 MG/1
5 TABLET ORAL DAILY
Status: DISCONTINUED | OUTPATIENT
Start: 2020-07-31 | End: 2020-07-31 | Stop reason: HOSPADM

## 2020-07-30 RX ORDER — MIDAZOLAM HYDROCHLORIDE 1 MG/ML
INJECTION INTRAMUSCULAR; INTRAVENOUS AS NEEDED
Status: DISCONTINUED | OUTPATIENT
Start: 2020-07-30 | End: 2020-07-30 | Stop reason: HOSPADM

## 2020-07-30 RX ORDER — CEFAZOLIN SODIUM 2 G/100ML
2 INJECTION, SOLUTION INTRAVENOUS ONCE
Status: COMPLETED | OUTPATIENT
Start: 2020-07-30 | End: 2020-07-30

## 2020-07-30 RX ORDER — DIVALPROEX SODIUM 125 MG/1
250 TABLET, DELAYED RELEASE ORAL 3 TIMES DAILY
Qty: 180 TABLET | Refills: 2 | Status: SHIPPED | OUTPATIENT
Start: 2020-07-30 | End: 2020-08-04 | Stop reason: SDUPTHER

## 2020-07-30 RX ADMIN — CLOPIDOGREL BISULFATE 75 MG: 75 TABLET ORAL at 08:41

## 2020-07-30 RX ADMIN — SODIUM CHLORIDE, PRESERVATIVE FREE 10 ML: 5 INJECTION INTRAVENOUS at 08:42

## 2020-07-30 RX ADMIN — SODIUM CHLORIDE, PRESERVATIVE FREE 10 ML: 5 INJECTION INTRAVENOUS at 21:47

## 2020-07-30 RX ADMIN — FOLIC ACID 1 MG: 1 TABLET ORAL at 08:41

## 2020-07-30 RX ADMIN — DIVALPROEX SODIUM 250 MG: 250 TABLET, DELAYED RELEASE ORAL at 16:14

## 2020-07-30 RX ADMIN — ASPIRIN 81 MG: 81 TABLET, COATED ORAL at 08:41

## 2020-07-30 RX ADMIN — DIVALPROEX SODIUM 250 MG: 250 TABLET, DELAYED RELEASE ORAL at 21:46

## 2020-07-30 RX ADMIN — LISINOPRIL 5 MG: 5 TABLET ORAL at 14:19

## 2020-07-30 RX ADMIN — CEFAZOLIN SODIUM 2 G: 2 INJECTION, SOLUTION INTRAVENOUS at 17:00

## 2020-07-30 RX ADMIN — PANTOPRAZOLE SODIUM 40 MG: 40 TABLET, DELAYED RELEASE ORAL at 08:41

## 2020-07-30 RX ADMIN — ATORVASTATIN CALCIUM 80 MG: 40 TABLET, FILM COATED ORAL at 21:46

## 2020-07-30 RX ADMIN — ACETAMINOPHEN 650 MG: 325 TABLET, FILM COATED ORAL at 10:36

## 2020-07-30 RX ADMIN — PANTOPRAZOLE SODIUM 40 MG: 40 TABLET, DELAYED RELEASE ORAL at 16:14

## 2020-07-30 RX ADMIN — NICOTINE 1 PATCH: 21 PATCH TRANSDERMAL at 08:41

## 2020-07-31 VITALS
RESPIRATION RATE: 16 BRPM | HEART RATE: 76 BPM | WEIGHT: 207.5 LBS | BODY MASS INDEX: 26.63 KG/M2 | OXYGEN SATURATION: 97 % | TEMPERATURE: 98 F | DIASTOLIC BLOOD PRESSURE: 66 MMHG | HEIGHT: 74 IN | SYSTOLIC BLOOD PRESSURE: 100 MMHG

## 2020-07-31 LAB
CARDIOLIPIN IGG SER IA-ACNC: <9 GPL U/ML (ref 0–14)
CARDIOLIPIN IGM SER IA-ACNC: <9 MPL U/ML (ref 0–12)

## 2020-07-31 PROCEDURE — G0378 HOSPITAL OBSERVATION PER HR: HCPCS

## 2020-07-31 PROCEDURE — 99217 PR OBSERVATION CARE DISCHARGE MANAGEMENT: CPT | Performed by: HOSPITALIST

## 2020-07-31 PROCEDURE — 99214 OFFICE O/P EST MOD 30 MIN: CPT | Performed by: INTERNAL MEDICINE

## 2020-07-31 RX ADMIN — PANTOPRAZOLE SODIUM 40 MG: 40 TABLET, DELAYED RELEASE ORAL at 09:04

## 2020-07-31 RX ADMIN — DIVALPROEX SODIUM 250 MG: 250 TABLET, DELAYED RELEASE ORAL at 05:33

## 2020-07-31 RX ADMIN — ASPIRIN 81 MG: 81 TABLET, COATED ORAL at 09:04

## 2020-07-31 RX ADMIN — ACETAMINOPHEN 650 MG: 325 TABLET, FILM COATED ORAL at 05:33

## 2020-07-31 RX ADMIN — FOLIC ACID 1 MG: 1 TABLET ORAL at 09:04

## 2020-07-31 RX ADMIN — LISINOPRIL 5 MG: 5 TABLET ORAL at 09:04

## 2020-07-31 RX ADMIN — NICOTINE 1 PATCH: 21 PATCH TRANSDERMAL at 09:03

## 2020-07-31 RX ADMIN — ACETAMINOPHEN 650 MG: 325 TABLET, FILM COATED ORAL at 00:14

## 2020-08-01 ENCOUNTER — READMISSION MANAGEMENT (OUTPATIENT)
Dept: CALL CENTER | Facility: HOSPITAL | Age: 50
End: 2020-08-01

## 2020-08-01 LAB
AT III PPP CHRO-ACNC: 94 % (ref 75–135)
B2 GLYCOPROT1 IGA SER-ACNC: <9 GPI IGA UNITS (ref 0–25)
B2 GLYCOPROT1 IGG SER-ACNC: <9 GPI IGG UNITS (ref 0–20)
B2 GLYCOPROT1 IGM SER-ACNC: <9 GPI IGM UNITS (ref 0–32)
CHOLEST SERPL-MCNC: 128 MG/DL (ref 100–199)
HDL SERPL-SCNC: 24.8 UMOL/L
HDLC SERPL-MCNC: 23 MG/DL
LA NT DPL PPP: 35.9 SEC (ref 0–55)
LA NT DPL/LA NT HPL PPP-RTO: 1 RATIO (ref 0–1.4)
LA NT PLATELET PPP: 39.7 SEC (ref 0–51.9)
LDL-P: 581 NMOL/L
LDLC REAL SIZE PAT SERPL: 19.6 NM
LDLC SERPL CALC-MCNC: 26 MG/DL (ref 0–99)
LP-IR SCORE**: 69
LUPUS ANTICOAGULANT REFLEX: NORMAL
PROTEIN S-FUNCTIONAL: 86 % (ref 63–140)
PRT C ACTIVITY (CHROMOGENIC): 120 %
SCREEN DRVVT: 41.5 SEC (ref 0–47)
SMALL LDL-P: 488 NMOL/L
THROMBIN TIME: 17.9 SEC (ref 0–23)
TRIGL SERPL-MCNC: 397 MG/DL (ref 0–149)

## 2020-08-01 NOTE — OUTREACH NOTE
Prep Survey      Responses   [facility] facility patient discharged from?  [place]   Is LACE score < 7 ?  No   Eligibility  Readm Mgmt   Discharge diagnosis  Acute ischemic stroke, confusion, emcephalopathy   COVID-19 Test Status  Negative   Does the patient have one of the following disease processes/diagnoses(primary or secondary)?  Other [No new stroke seen on MRI]   Does the patient have Home health ordered?  No   Is there a DME ordered?  No   Comments regarding appointments  see AVS   Medication alerts for this patient  see AVS   Prep survey completed?  Yes          Adamaris Lovett RN

## 2020-08-04 ENCOUNTER — READMISSION MANAGEMENT (OUTPATIENT)
Dept: CALL CENTER | Facility: HOSPITAL | Age: 50
End: 2020-08-04

## 2020-08-04 LAB
ACE SERPL-CCNC: 76 U/L (ref 14–82)
ANA SER QL: NEGATIVE

## 2020-08-04 RX ORDER — DIVALPROEX SODIUM 125 MG/1
250 TABLET, DELAYED RELEASE ORAL 3 TIMES DAILY
Qty: 180 TABLET | Refills: 2 | Status: SHIPPED | OUTPATIENT
Start: 2020-08-04 | End: 2020-10-29

## 2020-08-04 NOTE — OUTREACH NOTE
Medical Week 1 Survey      Responses   [facility] patient discharged from?  [place]   COVID-19 Test Status  Negative   Does the patient have one of the following disease processes/diagnoses(primary or secondary)?  Other   Is there a successful TCM telephone encounter documented?  No   Week 1 attempt successful?  Yes   Call start time  1601   Unsuccessful attempts  Attempt 1   Call end time  1617   Discharge diagnosis  Acute ischemic stroke, confusion, emcephalopathy   Is patient permission given to speak with other caregiver?  Yes   List who call center can speak with  Meera, wife   Person spoke with today (if not patient) and relationship  Meera, wife   Medication alerts for this patient  see AVS   Meds reviewed with patient/caregiver?  Yes   Is the patient having any side effects they believe may be caused by any medication additions or changes?  No   Does the patient have all medications ordered at discharge?  Yes   Is the patient taking all medications as directed (includes completed medication regime)?  Yes   Comments regarding appointments  see AVS   Does the patient have a primary care provider?   Yes   Does the patient have an appointment with their PCP within 7 days of discharge?  Yes   Has the patient kept scheduled appointments due by today?  N/A   Has home health visited the patient within 72 hours of discharge?  N/A   Pulse Ox monitoring  None   Psychosocial issues?  No   Did the patient receive a copy of their discharge instructions?  Yes   Nursing interventions  Reviewed instructions with patient   What is the patient's perception of their health status since discharge?  Returned to baseline/stable   Is the patient/caregiver able to teach back signs and symptoms related to disease process for when to call PCP?  Yes   Is the patient/caregiver able to teach back signs and symptoms related to disease process for when to call 911?  Yes   Is the patient/caregiver able to teach back the hierarchy of who  to call/visit for symptoms/problems? PCP, Specialist, Home health nurse, Urgent Care, ED, 911  Yes   Week 1 call completed?  Yes   Wrap up additional comments  Loop recorder site is sore but is not red. Memory problems are the same, just waiting to see what he is going to get back.  Is not getting any therapy right now.  Plans to eventually do speech and OT.  He some expressive aphasia. His Rt arm and leg weakness has resolved.  Wife had several questions.            Emily Casillas, RN

## 2020-08-19 ENCOUNTER — TELEPHONE (OUTPATIENT)
Dept: NEUROLOGY | Facility: CLINIC | Age: 50
End: 2020-08-19

## 2020-08-19 NOTE — TELEPHONE ENCOUNTER
----- Message from Phil Roca sent at 8/19/2020 12:14 PM EDT -----  Contact: [phone]  Dr. Carmona,     Called pt to RS his appointment on 8/26. Appointment was RS to first available, and added to wait list, but pt's wife wanted to inform us that ever since pt got out of the hospital he has been having trouble swallowing and getting food down, but was not having this before. Please advise.

## 2020-08-19 NOTE — TELEPHONE ENCOUNTER
Called and spoke with patient and his wife, Meera who stated understanding and appreciation. Scheduled tomorrow @ 11:00am as  advised.

## 2020-08-20 ENCOUNTER — OFFICE VISIT (OUTPATIENT)
Dept: NEUROLOGY | Facility: CLINIC | Age: 50
End: 2020-08-20

## 2020-08-20 VITALS
WEIGHT: 205.6 LBS | DIASTOLIC BLOOD PRESSURE: 74 MMHG | BODY MASS INDEX: 26.39 KG/M2 | SYSTOLIC BLOOD PRESSURE: 108 MMHG | HEART RATE: 80 BPM | HEIGHT: 74 IN | OXYGEN SATURATION: 98 %

## 2020-08-20 DIAGNOSIS — I69.30 SEQUELAE, POST-STROKE: Primary | ICD-10-CM

## 2020-08-20 PROCEDURE — 99214 OFFICE O/P EST MOD 30 MIN: CPT | Performed by: PSYCHIATRY & NEUROLOGY

## 2020-08-20 RX ORDER — ATORVASTATIN CALCIUM 40 MG/1
40 TABLET, FILM COATED ORAL DAILY
Qty: 30 TABLET | Refills: 11 | Status: SHIPPED | OUTPATIENT
Start: 2020-08-20 | End: 2021-05-18 | Stop reason: SDUPTHER

## 2020-08-20 NOTE — PROGRESS NOTES
Subjective:    CC: Lux Mckoy is seen today  for stroke    HPI:  Current visit-since the last visit patient had symptoms of bilateral hand tremors with more memory problems in July.  His wife took him to the hospital where he had a MRI scan of the brain which showed chronic bilateral infarcts but no acute abnormalities.  At the hospital patient was started on Eliquis 5 mg twice a day and cardiology also put him a loop recorder.  Prior to that his Holter monitor showed atrial tachycardia but no A. fib.  He was supposed to follow-up with outpatient cardiology but then all of these symptoms occurred.  He also continues to take aspirin 325 mg daily and Lipitor 80 mg daily.  His last lipid panel was as follows-, , LDL 35, HDL 28.  A1c was 5.  At the hospital he was also started on Depakote  mg 3 times a day for the pain in his legs and mood changes however patient states that his mood is fine and the Depakote does not help with the pain.  He is on gabapentin 300 mg twice a day and his pain physician will increase it to 400 mg 3 times daily soon for his chronic low back pain.  Patient has completely quit smoking now.   Of note-I personally reviewed his MRI scan of the brain and Dr. Davison's notes from the hospital.     Initial glein-83-jkol-old male with a history of chronic smoking, CKD (secondary to IgA nephropathy), chronic back pain status post 2 neck and 2 low back surgeries presents with a stroke.  As per patient's wife he started having symptoms of staggering gait with slurring of speech, confusion and right-sided numbness around 3/21.  He went to the hospital a day later where he had extensive testing.  CT scan of the head showed a hyper density in the left posterior parietal region.  CT perfusion also showed a perfusion deficit in that area.  CT angiogram of the head and neck showed mild to moderate stenosis in bilateral distal MCAs left more than right as well as atherosclerotic plaques in  both carotid bifurcations with no major stenosis.  MRI brain showed bilateral embolic infarcts in multiple vascular territories with the largest one being in the left posterior parietal region.  He had a TTE andTEE both of which showed a normal EF with no valvular abnormalities or PFO as well as no mass.  He also had a stress test which showed normal cardiac perfusion.  Patient states that he had also been having chest pains for the past few months with occasional palpitations.  Troponins were mildly elevated.  He was evaluated by cardiology in the hospital who felt he could be maintained on dual antiplatelets as well as Lipitor 80 mg daily.  His lipid panel was as follows-, , LDL 83, HDL 28.  A1c was 5.4.  Patient was smoking half a pack of cigarettes every day prior to his stroke but has quit smoking now.  He underwent PT/speech therapy after discharge but has now stopped due to COVID.  As per his wife he continues to get mild memory problems and speech deficits even now.  Of note-I personally reviewed his MRI brain and Dr. Larson's note from the hospital.    The following portions of the patient's history were reviewed today and updated as of 05/21/2020  : allergies, current medications, past family history, past medical history, past social history, past surgical history and problem list  These document will be scanned to patient's chart.      Current Outpatient Medications:   •  acetaminophen (TYLENOL) 325 MG tablet, Take 2 tablets by mouth Every 4 (Four) Hours As Needed for Mild Pain  or Headache., Disp: , Rfl:   •  apixaban (ELIQUIS) 5 MG tablet tablet, Take 1 tablet by mouth Every 12 (Twelve) Hours., Disp: 60 tablet, Rfl: 5  •  aspirin 81 MG tablet, Take 4 tablets by mouth Daily. Coated tab, Disp: , Rfl:   •  azelastine (ASTEPRO) 0.15 % solution nasal spray, 1 Squirt into each nostril 2 (two) times a day., Disp: , Rfl:   •  cetirizine (ZyrTEC) 10 MG tablet, Take 1 tablet by mouth Daily As  "Needed for allergies., Disp: 30 tablet, Rfl: 2  •  divalproex (DEPAKOTE) 125 MG DR tablet, Take 2 tablets by mouth 3 (Three) Times a Day., Disp: 180 tablet, Rfl: 2  •  docusate sodium 100 MG capsule, Take 100 mg by mouth 2 (Two) Times a Day As Needed for Constipation., Disp: , Rfl:   •  famotidine (PEPCID) 20 MG tablet, Take 1 tablet by mouth Every Night., Disp: , Rfl:   •  folic acid (FOLVITE) 1 MG tablet, Take 1 tablet by mouth Daily., Disp: , Rfl:   •  gabapentin (NEURONTIN) 600 MG tablet, Take 0.5 tablets by mouth Every Night., Disp: 60 tablet, Rfl: 1  •  lisinopril (PRINIVIL,ZESTRIL) 2.5 MG tablet, TAKE 1 TABLET BY MOUTH ONCE DAILY., Disp: 90 tablet, Rfl: 1  •  pantoprazole (PROTONIX) 40 MG EC tablet, Take 40 mg by mouth Daily., Disp: , Rfl:   •  PHARMACY MEDS TO BED CONSULT, Daily., Disp: , Rfl:   •  atorvastatin (Lipitor) 40 MG tablet, Take 1 tablet by mouth Daily., Disp: 30 tablet, Rfl: 11   Past Medical History:   Diagnosis Date   • Dupuytren contracture     s/p bilateral hand surgery   • Dyspnea    • Kidney infection    • Pancreatitis       Past Surgical History:   Procedure Laterality Date   • CARDIAC ELECTROPHYSIOLOGY PROCEDURE N/A 7/30/2020    Procedure: Loop insertion;  Surgeon: Terrell Da Silva MD;  Location: [facility] INVASIVE LOC[facility];  Service: Cardiovascular;  Laterality: N/A;   • CERVICAL DISCECTOMY ANTERIOR  02/2017    C6-7 at [facility]   • HAND SURGERY     • LUMBAR DISCECTOMY      \"Spinal\" times 2 in 2003 and 2005   • UMBILICAL HERNIA REPAIR      Dr. Diego Vasquez      Family History   Problem Relation Age of Onset   • Hypertension Other    • Other Other    • Hypertension Mother    • Heart disease Father    • Hypertension Father       Social History     Socioeconomic History   • Marital status:      Spouse name: Not on file   • Number of children: Not on file   • Years of education: Not on file   • Highest education level: Not on file   Tobacco Use   • Smoking status: " "Current Every Day Smoker     Packs/day: 0.50   • Smokeless tobacco: Never Used   Substance and Sexual Activity   • Alcohol use: Never     Frequency: Never   • Drug use: Never   • Sexual activity: Defer     Review of Systems   Musculoskeletal: Positive for back pain and gait problem.   Neurological: Positive for weakness.   All other systems reviewed and are negative.      Objective:    /74   Pulse 80   Ht 188 cm (74\")   Wt 93.3 kg (205 lb 9.6 oz)   SpO2 98%   BMI 26.40 kg/m²     Neurology Exam:    General apperance: NAD.     Mental status: Alert, awake and oriented to time place and person.    Recent and Remote memory: Intact.    Attention span and Concentration: Normal.     Language and Speech: Intact- No dysarthria.    Fluency, Naming , Repitition and Comprehension:  Intact    Cranial Nerves:   CN II: Visual fields are full. Intact. Fundi - Normal, No papillederma, Pupils - ELISA  CN III, IV and VI: Extraocular movements are intact. Normal saccades.   CN V: Facial sensation is intact.   CN VII: Muscles of facial expression reveal no asymmetry. Intact.   CN VIII: Hearing is intact. Whispered voice intact.   CN IX and X: Palate elevates symmetrically. Intact  CN XI: Shoulder shrug is intact.   CN XII: Tongue is midline without evidence of atrophy or fasciculation.       Motor:   5/5 in bilateral upper extremities with normal tone  4/5 in right lower extremity (restricted due to pain)  5/5 in left lower extremity    Sensory- mildly reduced to light touch in right upper extremity    Co-ordination: Normal finger-to-nose, heel to shin B/L.    Rhomberg: Negative.    Gait: Using a cane today but gait was normal without a cane (mildly antalgic)    Assessment and Plan:  1. Sequelae, post-stroke  Patient had bilateral embolic infarcts which could be due to paroxysmal atrial fibrillation.   He is currently on Eliquis 5 mg twice a day and also has a loop recorder in place.  I have asked him to lower his dose of " aspirin to 81 mg daily  He can also reduce his dose of Lipitor to 40 mg daily for goal LDL of less than 100.  His LDL was 35 however triglycerides are still elevated at 265  Maintain blood pressure less than 130/90.  His blood pressure is well controlled  I have told him to gradually taper and stop Depakote as it is not helping.  He will increase his dose of gabapentin to 400 mg 3 times a day for his back pain    Return in about 3 months (around 11/20/2020).       Nani Carmona MD

## 2020-09-18 RX ORDER — ASPIRIN 81 MG/1
81 TABLET ORAL DAILY
Qty: 150 TABLET | Refills: 2 | Status: SHIPPED | OUTPATIENT
Start: 2020-09-18 | End: 2021-05-18 | Stop reason: SDUPTHER

## 2020-09-18 NOTE — TELEPHONE ENCOUNTER
THE PT'S WIFE CALLED IN AND REQUESTED HE GET A ASPRIN 81 MG SENT INTO HIS PHARMACY  OF  [facility] IN [place]

## 2020-10-29 ENCOUNTER — OFFICE VISIT (OUTPATIENT)
Dept: CARDIOLOGY | Facility: CLINIC | Age: 50
End: 2020-10-29

## 2020-10-29 VITALS
HEIGHT: 74 IN | SYSTOLIC BLOOD PRESSURE: 118 MMHG | WEIGHT: 219 LBS | DIASTOLIC BLOOD PRESSURE: 84 MMHG | BODY MASS INDEX: 28.11 KG/M2 | OXYGEN SATURATION: 96 % | HEART RATE: 73 BPM

## 2020-10-29 DIAGNOSIS — I70.90 ATHEROSCLEROSIS: Primary | ICD-10-CM

## 2020-10-29 PROCEDURE — 99213 OFFICE O/P EST LOW 20 MIN: CPT | Performed by: INTERNAL MEDICINE

## 2020-10-29 RX ORDER — OXYCODONE AND ACETAMINOPHEN 7.5; 325 MG/1; MG/1
1 TABLET ORAL 3 TIMES DAILY
Status: ON HOLD | COMMUNITY
Start: 2020-10-01 | End: 2022-02-25

## 2020-10-29 RX ORDER — TAMSULOSIN HYDROCHLORIDE 0.4 MG/1
1 CAPSULE ORAL AS NEEDED
COMMUNITY
Start: 2020-09-28

## 2020-10-29 RX ORDER — NICOTINE 21 MG/24HR
PATCH, TRANSDERMAL 24 HOURS TRANSDERMAL
COMMUNITY
Start: 2020-08-05 | End: 2021-05-11 | Stop reason: SDUPTHER

## 2020-10-29 RX ORDER — OMEGA-3-ACID ETHYL ESTERS 1 G/1
1 CAPSULE, LIQUID FILLED ORAL DAILY
COMMUNITY
Start: 2020-08-05

## 2020-10-29 RX ORDER — PROMETHAZINE HYDROCHLORIDE 25 MG/1
TABLET ORAL
COMMUNITY
Start: 2020-09-28

## 2020-10-29 RX ORDER — SERTRALINE HYDROCHLORIDE 100 MG/1
100 TABLET, FILM COATED ORAL DAILY
COMMUNITY
Start: 2020-09-28 | End: 2021-10-14

## 2020-10-29 RX ORDER — GABAPENTIN 400 MG/1
300 CAPSULE ORAL 3 TIMES DAILY
COMMUNITY
Start: 2020-10-04

## 2020-10-29 NOTE — PROGRESS NOTES
[facility] Cardiology  Follow Up Visit  Lux Mckoy  1970    VISIT DATE:  10/29/20    PCP:   Emani Alas PA  [address]  [place] [address]          CC:  Chest Pain and Dizziness      Problem List:  1. Chest pain/shortness of breath:   a. Pulmonary evaluation.   b. Chronic pulmonary nodule, overall unchanged, followed by Dr. Mcknight and Gloria.   c. Echocardiogram 6/7/16: EF 55%, no sig valvular or structural disease  d. Stress test 6/7/2016: EF 63%, no ischemia on myocardial perfusion imaging.   e. PET Stress Test 3/2020:No evidence of ischemia, EF 63%  2. Embolic CVA  a. CT head 3/22/20: Abnormal low-attenuation region left frontoparietal posterior left MCA distribution involvement extending to involve the cortex with sulcal effacement concerning for acute or evolving infarction without intracranial hemorrhage  b. CTA head/neck 3/22/20: No large vessel occlusion including only minimal atherosclerotic involvement of the carotid bifurcations, however within the MCA distributions bilaterally, there are mild to moderate irregularities concerning for atherosclerotic involvement without distinct occlusion left greater than right of the M3 and M4 segments in particular.  c. CT perfusion with and without contrast 3/22/20: Reversible ischemia within the left posterior MCA distribution without evidence for core infarct component.  d. Echocardiogram 3/22/20: EF 60%, no sig valvular or structural disease. Negative bubble study.   e. Carotid duplex 3/22/20: no stenosis.   f. MRI brain 3/22/20: Abnormal restriction throughout the bilateral cerebral hemispheres. Multifocal appearance greatest region of fairly confluent restriction in the left temporoparietal region as seen on perfusion and prior imaging same day consistent with acute multifocal infarctions of concern for embolic etiology given bilateral and multifocal distribution. No midline shift or hydrocephalus.  g. CATY 3/24/2020: EF 60%,  negative saline study, normal valvular anatomy and function.   3. Atrial tachycardia  a. 14 day monitor: No AFib, PACs <1%, Short episodes of atrial tachycardia  b. Loop recorder in place  4. Hypertension.   5. Hyperlipidemia  a. FLP 3/23/20: Trig 228, HDL 28, , LDL 83.   6. IgA nephropathy.   7. GERD.   8. Depression.   9. Migraines.   10. BPH.   11. Degenerative disease.   12. Osteoarthritis.   13. Salvador's esophagus.   14. Tobacco abuse.   15. Surgical Hx:   a. Remote back surgery x2.   b. Three right hand surgeries and 2 left hand surgery.     History of Present Illness:  Lux Mckoy  Is a 50 y.o. male with pertinent cardiac history detailed above.  Following up for history of stroke as well as chest pain.  Is still have intermittent random chest pains that are mild.  Previous testing included multiple negative troponins and a stress test done earlier this year without evidence of ischemia.  He has remained more fatigued since his stroke diagnosis but does not have any focal deficits.  His loop recorder has not shown any atrial fibrillation.  He continues on aspirin and Eliquis without bleeding complications or new neurologic complaints.  He is still smoking but he is using patches to try to cut down.  Has been less active since his stroke diagnosis.  He is also limited by some chronic low back pain.      Patient Active Problem List    Diagnosis Date Noted   • CVA (cerebral vascular accident) (CMS/[facility])    • Anxiety    • Arthritis    • ASVD (arteriosclerotic vascular disease)    • Benign prostate hyperplasia    • Degenerative cervical disc    • Degeneration of lumbar intervertebral disc    • Dyspepsia    • Fatigue    • Gastritis    • Hyperlipidemia    • IgA nephropathy    • Muscle spasm    • Prostate disease    • RA (rheumatoid arthritis) (CMS/[facility])    • B12 deficiency    • Vitamin D deficiency    • Confusion 07/29/2020   • Expressive aphasia 03/25/2020   • Elevated troponin 03/24/2020   • Acute  ischemic stroke (CMS/[facility]) 03/22/2020   • Cerebrovascular atherosclerosis seen on imaging 03/22/2020   • Tobacco use 03/22/2020   • CKD (chronic kidney disease) stage 3, GFR 30-59 ml/min 03/22/2020   • Acute frontal sinusitis 08/05/2016   • Allergic rhinitis 08/01/2016   • Salvador esophagus 08/01/2016   • Chronic pain 08/01/2016   • Chronic prostatitis 08/01/2016   • Depression 08/01/2016   • Dyslipidemia 08/01/2016   • GERD (gastroesophageal reflux disease) 08/01/2016   • Hypertension 08/01/2016   • Hypogonadism in male 08/01/2016   • Neuropathy with IgA monoclonal gammopathy (CMS/[facility]) 08/01/2016   • Migraine 08/01/2016   • Neuropathy 08/01/2016   • Pulmonary nodule 08/01/2016     Note Last Updated: 8/1/2016      · A.  Ct scan of 3/25/2016 reports a 11 mm noncalcified stellate nodule in the lateral      portion of the right lower lobe.B. stable on CT scans of the abdomen and pelvis at      [facility] between 1/2015 and 3/2016.     • Vertigo 08/01/2016   • Tinea versicolor 08/01/2016       Allergies   Allergen Reactions   • Augmentin [Amoxicillin-Pot Clavulanate] Itching   • Bactrim [Sulfamethoxazole-Trimethoprim] Itching       Social History     Socioeconomic History   • Marital status:      Spouse name: Not on file   • Number of children: Not on file   • Years of education: Not on file   • Highest education level: Not on file   Tobacco Use   • Smoking status: Current Every Day Smoker     Packs/day: 0.25     Types: Cigarettes   • Smokeless tobacco: Never Used   Substance and Sexual Activity   • Alcohol use: Never     Frequency: Never   • Drug use: Never   • Sexual activity: Defer       Family History   Problem Relation Age of Onset   • Hypertension Other    • Other Other    • Hypertension Mother    • Heart disease Father    • Hypertension Father    • Diabetes Father    • Arthritis Neg Hx    • Hyperlipidemia Neg Hx        Current Medications:    Current Outpatient Medications:   •   acetaminophen (TYLENOL) 325 MG tablet, Take 2 tablets by mouth Every 4 (Four) Hours As Needed for Mild Pain  or Headache., Disp: , Rfl:   •  apixaban (ELIQUIS) 5 MG tablet tablet, Take 1 tablet by mouth Every 12 (Twelve) Hours., Disp: 60 tablet, Rfl: 5  •  aspirin 81 MG EC tablet, Take 1 tablet by mouth Daily., Disp: 150 tablet, Rfl: 2  •  atorvastatin (Lipitor) 40 MG tablet, Take 1 tablet by mouth Daily., Disp: 30 tablet, Rfl: 11  •  azelastine (ASTEPRO) 0.15 % solution nasal spray, 1 Squirt into each nostril 2 (two) times a day., Disp: , Rfl:   •  cetirizine (ZyrTEC) 10 MG tablet, Take 1 tablet by mouth Daily As Needed for allergies., Disp: 30 tablet, Rfl: 2  •  docusate sodium 100 MG capsule, Take 100 mg by mouth 2 (Two) Times a Day As Needed for Constipation., Disp: , Rfl:   •  famotidine (PEPCID) 20 MG tablet, Take 1 tablet by mouth Every Night., Disp: , Rfl:   •  folic acid (FOLVITE) 1 MG tablet, Take 1 tablet by mouth Daily., Disp: , Rfl:   •  gabapentin (NEURONTIN) 400 MG capsule, Take 400 mg by mouth 3 (Three) Times a Day., Disp: , Rfl:   •  lisinopril (PRINIVIL,ZESTRIL) 2.5 MG tablet, TAKE 1 TABLET BY MOUTH ONCE DAILY., Disp: 90 tablet, Rfl: 1  •  nicotine (NICODERM CQ) 14 MG/24HR patch, APPLY 1 PATCH DAILY AS DIRECTED AFTER COMPLETION OF THE 21 MG PATCH PRESCIPTION, Disp: , Rfl:   •  omega-3 acid ethyl esters (LOVAZA) 1 g capsule, Take 1 capsule by mouth Daily., Disp: , Rfl:   •  oxyCODONE-acetaminophen (PERCOCET) 7.5-325 MG per tablet, Take 1 tablet by mouth 3 (Three) Times a Day., Disp: , Rfl:   •  pantoprazole (PROTONIX) 40 MG EC tablet, Take 40 mg by mouth Daily., Disp: , Rfl:   •  PHARMACY MEDS TO BED CONSULT, Daily., Disp: , Rfl:   •  promethazine (PHENERGAN) 25 MG tablet, TAKE 1 TABLET BY MOUTH EVERY 4 TO 6 HOURS AS NEEDED FOR NAUSEA, Disp: , Rfl:   •  sertraline (ZOLOFT) 100 MG tablet, Take 100 mg by mouth Daily., Disp: , Rfl:   •  tamsulosin (FLOMAX) 0.4 MG capsule 24 hr capsule, Take 1 capsule  "by mouth As Needed., Disp: , Rfl:      Review of Systems   Constitution: Positive for malaise/fatigue.   Cardiovascular: Positive for chest pain (Atypical). Negative for dyspnea on exertion, irregular heartbeat, leg swelling, near-syncope, orthopnea and palpitations.   Musculoskeletal: Negative for back pain.   All other systems reviewed and are negative.      Vitals:    10/29/20 1310   BP: 118/84   BP Location: Right arm   Patient Position: Sitting   Pulse: 73   SpO2: 96%   Weight: 99.3 kg (219 lb)   Height: 188 cm (74\")       Physical Exam  Constitutional:       Appearance: Normal appearance.   HENT:      Head: Normocephalic and atraumatic.   Neck:      Vascular: No carotid bruit.   Cardiovascular:      Rate and Rhythm: Normal rate and regular rhythm.      Pulses: Normal pulses.   Pulmonary:      Effort: Pulmonary effort is normal.      Breath sounds: No rales.   Abdominal:      Palpations: Abdomen is soft.   Skin:     General: Skin is warm and dry.   Neurological:      General: No focal deficit present.      Mental Status: He is alert.         Diagnostic Data:  Procedures  Lab Results   Component Value Date    CHLPL 128 07/29/2020    TRIG 265 (H) 07/30/2020    HDL 28 (L) 07/30/2020     Lab Results   Component Value Date    GLUCOSE 96 07/30/2020    BUN 14 07/30/2020    CREATININE 1.65 (H) 07/30/2020     07/30/2020    K 3.9 07/30/2020     (H) 07/30/2020    CO2 25.0 07/30/2020     Lab Results   Component Value Date    HGBA1C 5.00 07/30/2020     Lab Results   Component Value Date    WBC 10.33 07/29/2020    HGB 16.1 07/29/2020    HCT 47.6 07/29/2020     07/29/2020     Loop recorder interrogation: No arrhythmias    Assessment:   Diagnosis Plan   1. Cerebrovascular atherosclerosis seen on imaging         Plan:    1. Chest Pain-suspected noncardiac  a. Troponins have historically been negative during admissions, EKG without ischemic changes  b. PET stress test 3/2020 without evidence of ischemia, EF " 63%  c. Still intermittent, mild appears nonanginal      2. Hypertension  a. Controlled elevated on low-dose lisinopril for his coexisting history of CKD    3. CKD III  a. Followed by nephrology as an outpatient stable    4. History of CVA  a. Admission 3/2020 concern for embolic source.  b. CATY without evidence of PFO.  c. CTA head neck negative for obstructive stenosis  d. Loop recorder in place without arrhythmias detected  e. On empiric Eliquis and ASA  f. Hypercoagulable work-up within normal limits  g. LDL down to 35      5. Tobacco Dependence  a. Using nicotine patches  b. Reinforced importance and cessation    At this time we will continue current stroke preventative strategy.  Aspirin and Eliquis.  Continue to follow liver monitor.  Other risk factors are well controlled with the exception of smoking.  Stressed importance of cessation.  Follow-up again in 6 months.        Joey Velazquez MD

## 2021-02-03 ENCOUNTER — LAB (OUTPATIENT)
Dept: LAB | Facility: HOSPITAL | Age: 51
End: 2021-02-03

## 2021-02-03 ENCOUNTER — OFFICE VISIT (OUTPATIENT)
Dept: NEUROLOGY | Facility: CLINIC | Age: 51
End: 2021-02-03

## 2021-02-03 VITALS
DIASTOLIC BLOOD PRESSURE: 86 MMHG | WEIGHT: 214.4 LBS | HEIGHT: 74 IN | OXYGEN SATURATION: 98 % | BODY MASS INDEX: 27.52 KG/M2 | SYSTOLIC BLOOD PRESSURE: 124 MMHG | HEART RATE: 70 BPM | TEMPERATURE: 98.6 F

## 2021-02-03 DIAGNOSIS — I69.30 SEQUELAE, POST-STROKE: Primary | ICD-10-CM

## 2021-02-03 DIAGNOSIS — R53.83 OTHER FATIGUE: ICD-10-CM

## 2021-02-03 DIAGNOSIS — G47.33 OBSTRUCTIVE SLEEP APNEA: ICD-10-CM

## 2021-02-03 PROCEDURE — 36415 COLL VENOUS BLD VENIPUNCTURE: CPT

## 2021-02-03 PROCEDURE — 82607 VITAMIN B-12: CPT

## 2021-02-03 PROCEDURE — 99214 OFFICE O/P EST MOD 30 MIN: CPT | Performed by: PSYCHIATRY & NEUROLOGY

## 2021-02-03 PROCEDURE — 84402 ASSAY OF FREE TESTOSTERONE: CPT

## 2021-02-03 PROCEDURE — 84439 ASSAY OF FREE THYROXINE: CPT

## 2021-02-03 PROCEDURE — 84207 ASSAY OF VITAMIN B-6: CPT

## 2021-02-03 PROCEDURE — 82306 VITAMIN D 25 HYDROXY: CPT

## 2021-02-03 PROCEDURE — 84403 ASSAY OF TOTAL TESTOSTERONE: CPT

## 2021-02-03 PROCEDURE — 84443 ASSAY THYROID STIM HORMONE: CPT

## 2021-02-03 NOTE — PROGRESS NOTES
Subjective:    CC: Lux Mckoy is seen today  for stroke    HPI:  Current visit- patient denies having any new strokelike symptoms since his last visit but he continues to be extremely tired during the day.  His wife also feels that he has short-term memory problems ever since he had the stroke.  Patient does report to snoring at night.  Continues to take aspirin 81 mg daily along with Eliquis and Lipitor 40 mg daily.  Thus far the loop recorder has not shown any evidence of atrial fibrillation.  He has stopped taking Depakote and has reduced his dose of gabapentin 400 mg to 1 to 2 tablets at night.  Also takes opiates for his back pain and Zoloft for his mood.    Last visit-since the last visit patient had symptoms of bilateral hand tremors with more memory problems in July.  His wife took him to the hospital where he had a MRI scan of the brain which showed chronic bilateral infarcts but no acute abnormalities.  At the hospital patient was started on Eliquis 5 mg twice a day and cardiology also put him a loop recorder.  Prior to that his Holter monitor showed atrial tachycardia but no A. fib.  He was supposed to follow-up with outpatient cardiology but then all of these symptoms occurred.  He also continues to take aspirin 325 mg daily and Lipitor 80 mg daily.  His last lipid panel was as follows-, , LDL 35, HDL 28.  A1c was 5.  At the hospital he was also started on Depakote  mg 3 times a day for the pain in his legs and mood changes however patient states that his mood is fine and the Depakote does not help with the pain.  He is on gabapentin 300 mg twice a day and his pain physician will increase it to 400 mg 3 times daily soon for his chronic low back pain.  Patient has completely quit smoking now.   Of note-I personally reviewed his MRI scan of the brain and Dr. Davison's notes from the hospital.     Initial aiiip-5-uilo-old male with a history of chronic smoking, CKD (secondary to IgA  nephropathy), chronic back pain status post 2 neck and 2 low back surgeries presents with a stroke.  As per patient's wife he started having symptoms of staggering gait with slurring of speech, confusion and right-sided numbness around 3/21.  He went to the hospital a day later where he had extensive testing.  CT scan of the head showed a hyper density in the left posterior parietal region.  CT perfusion also showed a perfusion deficit in that area.  CT angiogram of the head and neck showed mild to moderate stenosis in bilateral distal MCAs left more than right as well as atherosclerotic plaques in both carotid bifurcations with no major stenosis.  MRI brain showed bilateral embolic infarcts in multiple vascular territories with the largest one being in the left posterior parietal region.  He had a TTE andTEE both of which showed a normal EF with no valvular abnormalities or PFO as well as no mass.  He also had a stress test which showed normal cardiac perfusion.  Patient states that he had also been having chest pains for the past few months with occasional palpitations.  Troponins were mildly elevated.  He was evaluated by cardiology in the hospital who felt he could be maintained on dual antiplatelets as well as Lipitor 80 mg daily.  His lipid panel was as follows-, , LDL 83, HDL 28.  A1c was 5.4.  Patient was smoking half a pack of cigarettes every day prior to his stroke but has quit smoking now.  He underwent PT/speech therapy after discharge but has now stopped due to COVID.  As per his wife he continues to get mild memory problems and speech deficits even now.  Of note-I personally reviewed his MRI brain and Dr. Larson's note from the hospital.    The following portions of the patient's history were reviewed today and updated as of 05/21/2020  : allergies, current medications, past family history, past medical history, past social history, past surgical history and problem list  These document  will be scanned to patient's chart.      Current Outpatient Medications:   •  acetaminophen (TYLENOL) 325 MG tablet, Take 2 tablets by mouth Every 4 (Four) Hours As Needed for Mild Pain  or Headache., Disp: , Rfl:   •  apixaban (ELIQUIS) 5 MG tablet tablet, Take 1 tablet by mouth Every 12 (Twelve) Hours., Disp: 60 tablet, Rfl: 5  •  aspirin 81 MG EC tablet, Take 1 tablet by mouth Daily., Disp: 150 tablet, Rfl: 2  •  atorvastatin (Lipitor) 40 MG tablet, Take 1 tablet by mouth Daily., Disp: 30 tablet, Rfl: 11  •  azelastine (ASTEPRO) 0.15 % solution nasal spray, 1 Squirt into each nostril 2 (two) times a day., Disp: , Rfl:   •  cetirizine (ZyrTEC) 10 MG tablet, Take 1 tablet by mouth Daily As Needed for allergies., Disp: 30 tablet, Rfl: 2  •  docusate sodium 100 MG capsule, Take 100 mg by mouth 2 (Two) Times a Day As Needed for Constipation., Disp: , Rfl:   •  famotidine (PEPCID) 20 MG tablet, Take 1 tablet by mouth Every Night., Disp: , Rfl:   •  folic acid (FOLVITE) 1 MG tablet, Take 1 tablet by mouth Daily., Disp: , Rfl:   •  gabapentin (NEURONTIN) 400 MG capsule, Take 400 mg by mouth 3 (Three) Times a Day., Disp: , Rfl:   •  lisinopril (PRINIVIL,ZESTRIL) 2.5 MG tablet, TAKE 1 TABLET BY MOUTH ONCE DAILY., Disp: 90 tablet, Rfl: 1  •  nicotine (NICODERM CQ) 14 MG/24HR patch, APPLY 1 PATCH DAILY AS DIRECTED AFTER COMPLETION OF THE 21 MG PATCH PRESCIPTION, Disp: , Rfl:   •  omega-3 acid ethyl esters (LOVAZA) 1 g capsule, Take 1 capsule by mouth Daily., Disp: , Rfl:   •  oxyCODONE-acetaminophen (PERCOCET) 7.5-325 MG per tablet, Take 1 tablet by mouth 3 (Three) Times a Day., Disp: , Rfl:   •  pantoprazole (PROTONIX) 40 MG EC tablet, Take 40 mg by mouth Daily., Disp: , Rfl:   •  PHARMACY MEDS TO BED CONSULT, Daily., Disp: , Rfl:   •  promethazine (PHENERGAN) 25 MG tablet, TAKE 1 TABLET BY MOUTH EVERY 4 TO 6 HOURS AS NEEDED FOR NAUSEA, Disp: , Rfl:   •  sertraline (ZOLOFT) 100 MG tablet, Take 100 mg by mouth Daily., Disp:  ", Rfl:   •  tamsulosin (FLOMAX) 0.4 MG capsule 24 hr capsule, Take 1 capsule by mouth As Needed., Disp: , Rfl:    Past Medical History:   Diagnosis Date   • Allergic rhinitis    • Anxiety    • Arthritis    • ASVD (arteriosclerotic vascular disease)    • B12 deficiency    • Salvador's esophagus    • Benign prostate hyperplasia    • Confusion    • CVA (cerebral vascular accident) (CMS/HCC)    • Degeneration of lumbar intervertebral disc    • Degenerative cervical disc    • Depression    • Dupuytren contracture     s/p bilateral hand surgery   • Dyspepsia    • Dyspnea    • Fatigue    • Gastritis    • GERD (gastroesophageal reflux disease)    • Hyperlipidemia    • Hypertension    • IgA nephropathy    • Kidney infection    • Microscopic hematuria    • Migraine    • Muscle spasm    • Nausea    • Pancreatitis    • Prostate disease    • RA (rheumatoid arthritis) (CMS/HCC)    • Serum lipase elevation    • Vitamin D deficiency       Past Surgical History:   Procedure Laterality Date   • CARDIAC ELECTROPHYSIOLOGY PROCEDURE N/A 7/30/2020    Procedure: Loop insertion;  Surgeon: Terrell Da Silva MD;  Location: [facility] INVASIVE LOCATION;  Service: Cardiovascular;  Laterality: N/A;   • CERVICAL DISCECTOMY ANTERIOR  02/2017    C6-7 at [facility]   • HAND SURGERY     • LUMBAR DISCECTOMY      \"Spinal\" times 2 in 2003 and 2005   • UMBILICAL HERNIA REPAIR      Dr. Diego Vasquez      Family History   Problem Relation Age of Onset   • Hypertension Other    • Other Other    • Hypertension Mother    • Heart disease Father    • Hypertension Father    • Diabetes Father    • Arthritis Neg Hx    • Hyperlipidemia Neg Hx       Social History     Socioeconomic History   • Marital status:      Spouse name: Not on file   • Number of children: Not on file   • Years of education: Not on file   • Highest education level: Not on file   Tobacco Use   • Smoking status: Current Every Day Smoker     Packs/day: 0.25     Types: Cigarettes " "  • Smokeless tobacco: Never Used   Substance and Sexual Activity   • Alcohol use: Never     Frequency: Never   • Drug use: Never   • Sexual activity: Defer     Review of Systems   Constitutional: Positive for fatigue.   Musculoskeletal: Positive for back pain.   All other systems reviewed and are negative.      Objective:    /86   Pulse 70   Temp 98.6 °F (37 °C)   Ht 188 cm (74\")   Wt 97.3 kg (214 lb 6.4 oz)   SpO2 98%   BMI 27.53 kg/m²     Neurology Exam:    General apperance: NAD.     Mental status: Alert, awake and oriented to time place and person.    Recent and Remote memory: Intact.    Attention span and Concentration: Normal.     Language and Speech: Intact- No dysarthria.    Fluency, Naming , Repitition and Comprehension:  Intact    Cranial Nerves:   CN II: Visual fields are full. Intact. Fundi - Normal, No papillederma, Pupils - ELISA  CN III, IV and VI: Extraocular movements are intact. Normal saccades.   CN V: Facial sensation is intact.   CN VII: Muscles of facial expression reveal no asymmetry. Intact.   CN VIII: Hearing is intact. Whispered voice intact.   CN IX and X: Palate elevates symmetrically. Intact  CN XI: Shoulder shrug is intact.   CN XII: Tongue is midline without evidence of atrophy or fasciculation.       Motor:   5/5 in bilateral upper extremities with normal tone  4/5 in right lower extremity (restricted due to pain)  5/5 in left lower extremity    Sensory- mildly reduced to light touch in right upper extremity    Co-ordination: Normal finger-to-nose, heel to shin B/L.    Rhomberg: Negative.    Gait: Using a cane today but gait was normal without a cane (mildly antalgic)    Assessment and Plan:  1. Sequelae, post-stroke  Patient had bilateral embolic infarcts which could be due to paroxysmal atrial fibrillation.   He is currently on aspirin 81 mg and Eliquis 5 mg twice a day and also has a loop recorder in place.    Currently on Lipitor  40 mg daily for goal LDL of less than " 100.  His LDL was 35 however triglycerides are still elevated at 265.  I will recheck a lipid panel today  Maintain blood pressure less than 130/90.  His blood pressure is well controlled      2.  Fatigue  Is due to underlying sleep apnea.  I will also check blood work including B12, B6, TFTs, vitamin D and testosterone level as his level was previously low    3.  Sleep apnea  Based on his symptoms he could have possible sleep apnea.  I will give him a sleep medicine referral    4.  Smoking  He states he has quit smoking    Return in about 5 months (around 7/3/2021).       Nani Carmona MD

## 2021-02-04 LAB
25(OH)D3 SERPL-MCNC: 42.5 NG/ML (ref 30–100)
T4 FREE SERPL-MCNC: 1.34 NG/DL (ref 0.93–1.7)
TSH SERPL DL<=0.05 MIU/L-ACNC: 2.4 UIU/ML (ref 0.27–4.2)
VIT B12 BLD-MCNC: 882 PG/ML (ref 211–946)

## 2021-02-07 LAB — VIT B6 SERPL-MCNC: 11.1 UG/L (ref 5.3–46.7)

## 2021-02-09 ENCOUNTER — TELEPHONE (OUTPATIENT)
Dept: NEUROLOGY | Facility: CLINIC | Age: 51
End: 2021-02-09

## 2021-02-09 NOTE — TELEPHONE ENCOUNTER
Pt wanting a call regarding his lab results. States its been over and week and he hasn't received a call.     Please advise.     Thanks

## 2021-02-09 NOTE — TELEPHONE ENCOUNTER
Tell the patient that we did not call him because some of his test results are still pending.  Most of his results are back and are normal.  His testosterone levels are still pending.  The lab unfortunately did not draw his lipid panel and he will have to get it done at the next visit

## 2021-02-12 ENCOUNTER — TELEPHONE (OUTPATIENT)
Dept: NEUROLOGY | Facility: CLINIC | Age: 51
End: 2021-02-12

## 2021-02-12 PROCEDURE — G2066 INTER DEVC REMOTE 30D: HCPCS | Performed by: INTERNAL MEDICINE

## 2021-02-12 PROCEDURE — 93298 REM INTERROG DEV EVAL SCRMS: CPT | Performed by: INTERNAL MEDICINE

## 2021-02-12 NOTE — TELEPHONE ENCOUNTER
----- Message from Nani Carmona MD sent at 2/12/2021 10:33 AM EST -----  Can you please call the lab and asked them why the patient's testosterone levels are not back as yet.  It has been about 10 days

## 2021-02-16 LAB
TESTOST FREE SERPL-MCNC: 2.4 PG/ML (ref 6.8–21.5)
TESTOST SERPL-MCNC: 328.9 NG/DL (ref 264–916)

## 2021-02-23 ENCOUNTER — TELEPHONE (OUTPATIENT)
Dept: NEUROLOGY | Facility: CLINIC | Age: 51
End: 2021-02-23

## 2021-02-23 NOTE — TELEPHONE ENCOUNTER
PT. WIFE TELEPHONE TO ADVISE THEY HAVE NOT HEARD FROM ANYONE WITH REGARDS TO SCHEDULING PT SLEEP STUDY.    PLEASE TEL. HAILEY SEARS @ [phone] TO ADVISE.    THANK YOU.

## 2021-03-11 ENCOUNTER — TELEPHONE (OUTPATIENT)
Dept: NEUROLOGY | Facility: CLINIC | Age: 51
End: 2021-03-11

## 2021-03-11 NOTE — TELEPHONE ENCOUNTER
Yes patient can receive an epidural shot.  The blood thinner needs to be stopped 4-5 days prior to his shot and restarted the day after the shot.

## 2021-03-11 NOTE — TELEPHONE ENCOUNTER
PT'S WIFE, HAILEY SEARS, HAS CALLED REGARDING PT'S PAIN MANAGEMENT CARE. SHE STATES THAT PT RECENTLY SAW HIS PAIN MANAGEMENT PROVIDER, AND WITH PT'S INCREASED BACK PAIN, PROVIDER SUGGESTED PT CONSULT WITH DR. GUTIÉRREZ ABOUT HIM RECEIVING AN EPIDURAL SHOT FOR HIS BACK PAIN. PT'S WIFE STATES THAT PT WAS UNABLE TO RECEIVE EPIDURAL DUE TO HIM NOT BEING ON HIS BLOOD THINNNER MEDICATION FOR A YEAR. PT IS NEAR THE YEAR JASWINDER WITH NO COMPLICATIONS AND IS NEEDING TO KNOW IF HE CAN RECEIVE AN EPIDURAL FOR BACK PAIN.    PT'S WIFE CAN BE REACHED AT [phone].    PLEASE REVIEW AND ADVISE.

## 2021-03-15 PROCEDURE — G2066 INTER DEVC REMOTE 30D: HCPCS | Performed by: INTERNAL MEDICINE

## 2021-03-15 PROCEDURE — 93298 REM INTERROG DEV EVAL SCRMS: CPT | Performed by: INTERNAL MEDICINE

## 2021-04-15 PROCEDURE — 93298 REM INTERROG DEV EVAL SCRMS: CPT | Performed by: INTERNAL MEDICINE

## 2021-04-15 PROCEDURE — G2066 INTER DEVC REMOTE 30D: HCPCS | Performed by: INTERNAL MEDICINE

## 2021-04-19 DIAGNOSIS — I69.30 SEQUELAE, POST-STROKE: Primary | ICD-10-CM

## 2021-04-20 ENCOUNTER — TELEMEDICINE (OUTPATIENT)
Dept: SLEEP MEDICINE | Facility: HOSPITAL | Age: 51
End: 2021-04-20

## 2021-04-20 DIAGNOSIS — E66.3 OVERWEIGHT: ICD-10-CM

## 2021-04-20 DIAGNOSIS — R06.83 SNORING: Primary | ICD-10-CM

## 2021-04-20 DIAGNOSIS — G47.33 OBSTRUCTIVE SLEEP APNEA, ADULT: ICD-10-CM

## 2021-04-20 PROCEDURE — 99203 OFFICE O/P NEW LOW 30 MIN: CPT | Performed by: INTERNAL MEDICINE

## 2021-04-21 NOTE — PROGRESS NOTES
Subjective   Lux Mckoy is a 50 y.o. male is being seen for consultation today at the request of Nani Carmona MD for the evaluation of snoring and possible sleep disordered breathing.  His primary care provider is Emani ATKINSON.  You have chosen to receive care through a telehealth visit.  Do you consent to use a video/audio connection for your medical care today? Yes    History of Present Illness  Patient complains that he has been told that he snores loudly and is very tired during the day.  He had a stroke about a year ago that left him with problems with decreased memory and some weakness on both sides.  He has had snoring noted for at least 5 years.  His apparently been worse when he was on his back.  He has had apneas also noted for about 2 years.  He denies awakening gasping for breath.  His wife does state that he gasps at night and seems to have apneas.  He is not rested in the morning.  He will awaken with a dry mouth.  He denies ever breaking his nose.  He has a headache in the morning almost daily.    He has a history of seasonal allergies.  He has trouble breathing through his nose at times.  He has been told he had a deviated septum but he does not remember breaking his nose.  He is sleepy during the day.    He often does not go to bed until 130 or 2 AM.  He sometimes has problems going to sleep.  He awakens between 2 and 6 times per night.  He thinks he gets 8 to 12 hours of sleep per night but is not rested.  He may nap for 1 to 2 hours during the day.  He has had hypertension known for at least 9 years.  He has been found to have renal disease.  He denies any history of diabetes.  He has been told he had arrhythmias.  Allergies   Allergen Reactions   • Augmentin [Amoxicillin-Pot Clavulanate] Itching   • Bactrim [Sulfamethoxazole-Trimethoprim] Itching          Current Outpatient Medications:   •  acetaminophen (TYLENOL) 325 MG tablet, Take 2 tablets by mouth Every 4 (Four) Hours As Needed  for Mild Pain  or Headache., Disp: , Rfl:   •  apixaban (ELIQUIS) 5 MG tablet tablet, Take 1 tablet by mouth Every 12 (Twelve) Hours., Disp: 60 tablet, Rfl: 2  •  aspirin 81 MG EC tablet, Take 1 tablet by mouth Daily., Disp: 150 tablet, Rfl: 2  •  atorvastatin (Lipitor) 40 MG tablet, Take 1 tablet by mouth Daily., Disp: 30 tablet, Rfl: 11  •  azelastine (ASTEPRO) 0.15 % solution nasal spray, 1 Squirt into each nostril 2 (two) times a day., Disp: , Rfl:   •  cetirizine (ZyrTEC) 10 MG tablet, Take 1 tablet by mouth Daily As Needed for allergies., Disp: 30 tablet, Rfl: 2  •  docusate sodium 100 MG capsule, Take 100 mg by mouth 2 (Two) Times a Day As Needed for Constipation., Disp: , Rfl:   •  famotidine (PEPCID) 20 MG tablet, Take 1 tablet by mouth Every Night., Disp: , Rfl:   •  folic acid (FOLVITE) 1 MG tablet, Take 1 tablet by mouth Daily., Disp: , Rfl:   •  gabapentin (NEURONTIN) 400 MG capsule, Take 400 mg by mouth 3 (Three) Times a Day., Disp: , Rfl:   •  lisinopril (PRINIVIL,ZESTRIL) 2.5 MG tablet, TAKE 1 TABLET BY MOUTH ONCE DAILY., Disp: 90 tablet, Rfl: 1  •  nicotine (NICODERM CQ) 14 MG/24HR patch, APPLY 1 PATCH DAILY AS DIRECTED AFTER COMPLETION OF THE 21 MG PATCH PRESCIPTION, Disp: , Rfl:   •  omega-3 acid ethyl esters (LOVAZA) 1 g capsule, Take 1 capsule by mouth Daily., Disp: , Rfl:   •  oxyCODONE-acetaminophen (PERCOCET) 7.5-325 MG per tablet, Take 1 tablet by mouth 3 (Three) Times a Day., Disp: , Rfl:   •  pantoprazole (PROTONIX) 40 MG EC tablet, Take 40 mg by mouth Daily., Disp: , Rfl:   •  PHARMACY MEDS TO BED CONSULT, Daily., Disp: , Rfl:   •  promethazine (PHENERGAN) 25 MG tablet, TAKE 1 TABLET BY MOUTH EVERY 4 TO 6 HOURS AS NEEDED FOR NAUSEA, Disp: , Rfl:   •  sertraline (ZOLOFT) 100 MG tablet, Take 100 mg by mouth Daily., Disp: , Rfl:   •  tamsulosin (FLOMAX) 0.4 MG capsule 24 hr capsule, Take 1 capsule by mouth As Needed., Disp: , Rfl:     Social History    Tobacco Use      Smoking status:  "Current Every Day Smoker        Packs/day: 0.25 he estimates he smoked since he was 17 years old        Types: Cigarettes      Smokeless tobacco: Never Used       Social History     Substance and Sexual Activity   Alcohol Use Never       Caffeine: He sometimes has a cup of coffee and has 2 caffeinated soft drinks each day    Past Medical History:   Diagnosis Date   • Allergic rhinitis    • Anxiety    • Arthritis    • ASVD (arteriosclerotic vascular disease)    • B12 deficiency    • Salvador's esophagus    • Benign prostate hyperplasia    • Confusion    • CVA (cerebral vascular accident) (CMS/[facility])    • Degeneration of lumbar intervertebral disc    • Degenerative cervical disc    • Depression    • Dupuytren contracture     s/p bilateral hand surgery   • Dyspepsia    • Dyspnea    • Fatigue    • Gastritis    • GERD (gastroesophageal reflux disease)    • Hyperlipidemia    • Hypertension    • IgA nephropathy    • Kidney infection    • Microscopic hematuria    • Migraine    • Muscle spasm    • Nausea    • Pancreatitis    • Prostate disease    • RA (rheumatoid arthritis) (CMS/[facility])    • Serum lipase elevation    • Vitamin D deficiency        Past Surgical History:   Procedure Laterality Date   • CARDIAC ELECTROPHYSIOLOGY PROCEDURE N/A 7/30/2020    Procedure: Loop insertion;  Surgeon: Terrell Da Silva MD;  Location: [facility] INVASIVE LOCATION;  Service: Cardiovascular;  Laterality: N/A;   • CERVICAL DISCECTOMY ANTERIOR  02/2017    [facility]   • HAND SURGERY     • LUMBAR DISCECTOMY      \"Spinal\" times 2 in 2003 and 2005   • UMBILICAL HERNIA REPAIR      Dr. Diego Vasquez   He has had wisdom teeth extraction    Family History   Problem Relation Age of Onset   • Hypertension Other    • Other Other    • Hypertension Mother    • Heart disease Father    • Hypertension Father    • Diabetes Father    • Arthritis Neg Hx    • Hyperlipidemia Neg Hx    Family history is positive for stroke and sleep apnea    The " following portions of the patient's history were reviewed and updated as appropriate: allergies, current medications, past family history, past medical history, past social history, past surgical history and problem list.    Review of Systems   Constitutional: Positive for activity change and fatigue.   HENT: Positive for hearing loss, sinus pressure, sinus pain, sneezing and trouble swallowing.    Eyes: Positive for itching.   Respiratory: Positive for apnea, cough, choking and wheezing.    Cardiovascular: Positive for leg swelling.   Gastrointestinal: Positive for nausea and rectal pain.   Endocrine: Negative.    Genitourinary: Positive for difficulty urinating, testicular pain and urgency.   Musculoskeletal: Positive for back pain, gait problem, joint swelling, myalgias, neck pain and neck stiffness.   Skin: Positive for color change and pallor.   Allergic/Immunologic: Positive for environmental allergies.   Neurological: Positive for weakness, light-headedness and headaches.        He has a history of stroke   Hematological: Negative.    Psychiatric/Behavioral: Positive for decreased concentration and sleep disturbance.   [place] score is 9/24    Objective     There were no vitals taken for this visit.     Physical Exam  Patient appears to be awake and alert.  He is not appear to be in acute respiratory distress.  His stated weight is 215 pounds.  His height 6 feet 2 inches.  His body mass index is 27.  He appears to have Mallampati class IV anatomy.    Assessment/Plan   Diagnoses and all orders for this visit:    1. Snoring (Primary)  -     Home Sleep Study; Future    2. Obstructive sleep apnea, adult  -     Home Sleep Study; Future    3. Overweight    Patient has a history of snoring and nonrestorative sleep.  He gives an excellent story for obstructive sleep apnea.  We will plan to proceed to home sleep testing.  We have discussed possible therapies including CPAP, weight control, oral appliance, and surgery.   We have discussed the long-term consequences of untreated obstructive sleep apnea including hypertension, diabetes, heart disease, stroke, and dementia.  He is encouraged to achieve ideal body weight.  He is encouraged to avoid alcohol and sedatives close to bedtime.  He is encouraged to practice lateral position sleep.  We will plan to see him back after his study and reassess situation.    Total time: 35 minutes exclusive of procedures.         Srinath Townsend MD [facility]  Sleep Medicine  Pulmonary and Critical Care Medicine

## 2021-05-11 ENCOUNTER — OFFICE VISIT (OUTPATIENT)
Dept: CARDIOLOGY | Facility: CLINIC | Age: 51
End: 2021-05-11

## 2021-05-11 VITALS
BODY MASS INDEX: 27.34 KG/M2 | OXYGEN SATURATION: 95 % | WEIGHT: 213 LBS | HEART RATE: 71 BPM | SYSTOLIC BLOOD PRESSURE: 110 MMHG | DIASTOLIC BLOOD PRESSURE: 78 MMHG | HEIGHT: 74 IN

## 2021-05-11 DIAGNOSIS — I10 ESSENTIAL HYPERTENSION: Primary | ICD-10-CM

## 2021-05-11 DIAGNOSIS — I70.90 ATHEROSCLEROSIS: ICD-10-CM

## 2021-05-11 PROCEDURE — 99214 OFFICE O/P EST MOD 30 MIN: CPT | Performed by: INTERNAL MEDICINE

## 2021-05-11 RX ORDER — FUROSEMIDE 20 MG/1
20 TABLET ORAL DAILY PRN
Qty: 30 TABLET | Refills: 3 | Status: SHIPPED | OUTPATIENT
Start: 2021-05-11 | End: 2021-12-06

## 2021-05-11 RX ORDER — NICOTINE 21 MG/24HR
1 PATCH, TRANSDERMAL 24 HOURS TRANSDERMAL EVERY 24 HOURS
Qty: 30 EACH | Refills: 6 | Status: SHIPPED | OUTPATIENT
Start: 2021-05-11 | End: 2021-05-11 | Stop reason: SDUPTHER

## 2021-05-11 RX ORDER — NICOTINE 21 MG/24HR
1 PATCH, TRANSDERMAL 24 HOURS TRANSDERMAL EVERY 24 HOURS
Qty: 30 EACH | Refills: 6 | Status: SHIPPED | OUTPATIENT
Start: 2021-05-11 | End: 2022-02-01

## 2021-05-11 RX ORDER — MECLIZINE HYDROCHLORIDE 25 MG/1
25 TABLET ORAL AS NEEDED
COMMUNITY

## 2021-05-11 RX ORDER — LISINOPRIL 2.5 MG/1
2.5 TABLET ORAL AS NEEDED
COMMUNITY
End: 2022-02-22 | Stop reason: ALTCHOICE

## 2021-05-11 RX ORDER — FUROSEMIDE 20 MG/1
20 TABLET ORAL DAILY PRN
Qty: 30 TABLET | Refills: 3 | Status: SHIPPED | OUTPATIENT
Start: 2021-05-11 | End: 2021-05-11 | Stop reason: SDUPTHER

## 2021-05-11 NOTE — PROGRESS NOTES
[facility] Cardiology  Follow Up Visit  Lux Mckoy  1970    VISIT DATE:  05/11/21    PCP:   Emani Alas PA  [address]  [place] [address]          CC:  Cerebrovascular atherosclerosis seen on imaging      Problem List:  1. Chest pain/shortness of breath:   a. Pulmonary evaluation.   b. Chronic pulmonary nodule, overall unchanged, followed by Dr. Mcknight and Gloria.   c. Echocardiogram 6/7/16: EF 55%, no sig valvular or structural disease  d. Stress test 6/7/2016: EF 63%, no ischemia on myocardial perfusion imaging.   e. PET Stress Test 3/2020:No evidence of ischemia, EF 63%  2. Embolic CVA  a. CT head 3/22/20: Abnormal low-attenuation region left frontoparietal posterior left MCA distribution involvement extending to involve the cortex with sulcal effacement concerning for acute or evolving infarction without intracranial hemorrhage  b. CTA head/neck 3/22/20: No large vessel occlusion including only minimal atherosclerotic involvement of the carotid bifurcations, however within the MCA distributions bilaterally, there are mild to moderate irregularities concerning for atherosclerotic involvement without distinct occlusion left greater than right of the M3 and M4 segments in particular.  c. CT perfusion with and without contrast 3/22/20: Reversible ischemia within the left posterior MCA distribution without evidence for core infarct component.  d. Echocardiogram 3/22/20: EF 60%, no sig valvular or structural disease. Negative bubble study.   e. Carotid duplex 3/22/20: no stenosis.   f. MRI brain 3/22/20: Abnormal restriction throughout the bilateral cerebral hemispheres. Multifocal appearance greatest region of fairly confluent restriction in the left temporoparietal region as seen on perfusion and prior imaging same day consistent with acute multifocal infarctions of concern for embolic etiology given bilateral and multifocal distribution. No midline shift or  hydrocephalus.  g. CATY 3/24/2020: EF 60%, negative saline study, normal valvular anatomy and function.   3. Atrial tachycardia  a. 14 day monitor: No AFib, PACs <1%, Short episodes of atrial tachycardia  b. Loop recorder in place, no A. fib detected  4. Hypertension.   5. Hyperlipidemia  a. FLP 3/23/20: Trig 228, HDL 28, , LDL 83.   6. IgA nephropathy.   7. GERD.   8. Depression.   9. Migraines.   10. BPH.   11. Degenerative disease.   12. Osteoarthritis.   13. Salvador's esophagus.   14. Tobacco abuse.   15. Surgical Hx:   a. Remote back surgery x2.   b. Three right hand surgeries and 2 left hand surgery.    History of Present Illness:  Lux Mckoy  Is a 50 y.o. male with pertinent cardiac history detailed above.  Patient following up for history of stroke.  His loop recorder has not shown atrial fibrillation.  He is on aspirin and Eliquis for embolic stroke of undetermined source.  He is still dealing with residual symptoms of forgetfulness and fatigue from the initial stroke.  Has not had any new events.  Blood pressures controlled.  Not endorsing any chest pain today.  Has had intermittent lower extremity edema.  He does have known CKD.  He is still smoking about 1/2 pack/day.  Also being seen by urology for BPH and low testosterone.      Patient Active Problem List    Diagnosis Date Noted   • CVA (cerebral vascular accident) (CMS/[facility])    • Anxiety    • Arthritis    • ASVD (arteriosclerotic vascular disease)    • Benign prostate hyperplasia    • Degenerative cervical disc    • Degeneration of lumbar intervertebral disc    • Dyspepsia    • Fatigue    • Gastritis    • Hyperlipidemia    • IgA nephropathy    • Muscle spasm    • Prostate disease    • RA (rheumatoid arthritis) (CMS/[facility])    • B12 deficiency    • Vitamin D deficiency    • Confusion 07/29/2020   • Expressive aphasia 03/25/2020   • Elevated troponin 03/24/2020   • Acute ischemic stroke (CMS/[facility]) 03/22/2020   • Cerebrovascular atherosclerosis  seen on imaging 03/22/2020   • Tobacco use 03/22/2020   • CKD (chronic kidney disease) stage 3, GFR 30-59 ml/min (CMS/[facility]) 03/22/2020   • Acute frontal sinusitis 08/05/2016   • Allergic rhinitis 08/01/2016   • Salvador esophagus 08/01/2016   • Chronic pain 08/01/2016   • Chronic prostatitis 08/01/2016   • Depression 08/01/2016   • Dyslipidemia 08/01/2016   • GERD (gastroesophageal reflux disease) 08/01/2016   • Hypertension 08/01/2016   • Hypogonadism in male 08/01/2016   • Neuropathy with IgA monoclonal gammopathy (CMS/[facility]) 08/01/2016   • Migraine 08/01/2016   • Neuropathy 08/01/2016   • Pulmonary nodule 08/01/2016     Note Last Updated: 8/1/2016      · A.  Ct scan of 3/25/2016 reports a 11 mm noncalcified stellate nodule in the lateral      portion of the right lower lobe.B. stable on CT scans of the abdomen and pelvis at      [facility] between 1/2015 and 3/2016.     • Vertigo 08/01/2016   • Tinea versicolor 08/01/2016       Allergies   Allergen Reactions   • Augmentin [Amoxicillin-Pot Clavulanate] Itching   • Bactrim [Sulfamethoxazole-Trimethoprim] Itching       Social History     Socioeconomic History   • Marital status:      Spouse name: Not on file   • Number of children: Not on file   • Years of education: Not on file   • Highest education level: Not on file   Tobacco Use   • Smoking status: Current Every Day Smoker     Packs/day: 0.25     Types: Cigarettes   • Smokeless tobacco: Never Used   Substance and Sexual Activity   • Alcohol use: Never   • Drug use: Never   • Sexual activity: Defer       Family History   Problem Relation Age of Onset   • Hypertension Other    • Other Other    • Hypertension Mother    • Heart disease Father    • Hypertension Father    • Diabetes Father    • Arthritis Neg Hx    • Hyperlipidemia Neg Hx        Current Medications:    Current Outpatient Medications:   •  acetaminophen (TYLENOL) 325 MG tablet, Take 2 tablets by mouth Every 4 (Four) Hours As Needed  for Mild Pain  or Headache., Disp: , Rfl:   •  apixaban (ELIQUIS) 5 MG tablet tablet, Take 1 tablet by mouth Every 12 (Twelve) Hours., Disp: 60 tablet, Rfl: 2  •  aspirin 81 MG EC tablet, Take 1 tablet by mouth Daily., Disp: 150 tablet, Rfl: 2  •  atorvastatin (Lipitor) 40 MG tablet, Take 1 tablet by mouth Daily., Disp: 30 tablet, Rfl: 11  •  azelastine (ASTEPRO) 0.15 % solution nasal spray, 1 Squirt into each nostril 2 (two) times a day., Disp: , Rfl:   •  cetirizine (ZyrTEC) 10 MG tablet, Take 1 tablet by mouth Daily As Needed for allergies., Disp: 30 tablet, Rfl: 2  •  famotidine (PEPCID) 20 MG tablet, Take 1 tablet by mouth Every Night., Disp: , Rfl:   •  folic acid (FOLVITE) 1 MG tablet, Take 1 tablet by mouth Daily., Disp: , Rfl:   •  gabapentin (NEURONTIN) 400 MG capsule, Take 400 mg by mouth 2 (Two) Times a Day., Disp: , Rfl:   •  lisinopril (PRINIVIL,ZESTRIL) 2.5 MG tablet, Take 2.5 mg by mouth As Needed., Disp: , Rfl:   •  meclizine (ANTIVERT) 25 MG tablet, Take 25 mg by mouth As Needed., Disp: , Rfl:   •  omega-3 acid ethyl esters (LOVAZA) 1 g capsule, Take 1 capsule by mouth Daily., Disp: , Rfl:   •  oxyCODONE-acetaminophen (PERCOCET) 7.5-325 MG per tablet, Take 1 tablet by mouth 3 (Three) Times a Day., Disp: , Rfl:   •  pantoprazole (PROTONIX) 40 MG EC tablet, Take 40 mg by mouth Daily., Disp: , Rfl:   •  promethazine (PHENERGAN) 25 MG tablet, TAKE 1 TABLET BY MOUTH EVERY 4 TO 6 HOURS AS NEEDED FOR NAUSEA, Disp: , Rfl:   •  sertraline (ZOLOFT) 100 MG tablet, Take 100 mg by mouth Daily., Disp: , Rfl:   •  tamsulosin (FLOMAX) 0.4 MG capsule 24 hr capsule, Take 1 capsule by mouth As Needed., Disp: , Rfl:   •  nicotine (NICODERM CQ) 14 MG/24HR patch, APPLY 1 PATCH DAILY AS DIRECTED AFTER COMPLETION OF THE 21 MG PATCH PRESCIPTION, Disp: , Rfl:      Review of Systems   Cardiovascular: Positive for leg swelling. Negative for chest pain, dyspnea on exertion and irregular heartbeat.   Respiratory: Negative for  "cough and shortness of breath.    Neurological: Positive for difficulty with concentration.        Memory difficulty       Vitals:    05/11/21 1340   BP: 110/78   BP Location: Right arm   Patient Position: Sitting   Cuff Size: Large Adult   Pulse: 71   SpO2: 95%   Weight: 96.6 kg (213 lb)   Height: 188 cm (74\")       Physical Exam  Constitutional:       Appearance: Normal appearance.   Cardiovascular:      Rate and Rhythm: Normal rate and regular rhythm.      Pulses: Normal pulses.   Pulmonary:      Effort: Pulmonary effort is normal.      Breath sounds: No rales.   Musculoskeletal:      Right lower leg: Edema present.      Left lower leg: Edema present.      Comments: Mild lower extremity   Neurological:      General: No focal deficit present.      Mental Status: He is alert.   Psychiatric:         Mood and Affect: Mood normal.         Diagnostic Data:  Procedures  Lab Results   Component Value Date    CHLPL 128 07/29/2020    TRIG 265 (H) 07/30/2020    HDL 28 (L) 07/30/2020     Lab Results   Component Value Date    GLUCOSE 96 07/30/2020    BUN 14 07/30/2020    CREATININE 1.65 (H) 07/30/2020     07/30/2020    K 3.9 07/30/2020     (H) 07/30/2020    CO2 25.0 07/30/2020     Lab Results   Component Value Date    HGBA1C 5.00 07/30/2020     Lab Results   Component Value Date    WBC 10.33 07/29/2020    HGB 16.1 07/29/2020    HCT 47.6 07/29/2020     07/29/2020       Assessment:  No diagnosis found.    Plan:      1.  Previous episode of chest pain chest   a. PET stress test 3/2020 without evidence of ischemia, EF 63%  b. Still intermittent, mild appears nonanginal        2. Hypertension  a. Controlled  on low-dose lisinopril for his coexisting history of CKD  b. Lasix 20mg prn edema     3. CKD III  a. Followed by nephrology as an outpatient stable     4. History of CVA  a. Admission 3/2020 concern for embolic source.  b. CATY without evidence of PFO.  c. CTA head neck negative for obstructive stenosis, " there was atherosclerosis  d. Loop recorder in place without arrhythmias detected  e. On empiric Eliquis and ASA  f. Hypercoagulable work-up within normal limits  g. LDL down to 35  h. Continue current management  i. Still dealing with post stroke sequela        5. Tobacco Dependence  a. Using nicotine patches-refilled today, still smoking 1/2 pack/day  b. Reinforced importance and cessation      Follow-up in 6 months, sooner as needed    Joey Velazquez MD [facility]

## 2021-05-16 PROCEDURE — 93298 REM INTERROG DEV EVAL SCRMS: CPT | Performed by: INTERNAL MEDICINE

## 2021-05-16 PROCEDURE — G2066 INTER DEVC REMOTE 30D: HCPCS | Performed by: INTERNAL MEDICINE

## 2021-05-17 ENCOUNTER — TELEMEDICINE (OUTPATIENT)
Dept: NEUROLOGY | Facility: CLINIC | Age: 51
End: 2021-05-17

## 2021-05-17 DIAGNOSIS — I69.30 SEQUELAE, POST-STROKE: Primary | ICD-10-CM

## 2021-05-17 DIAGNOSIS — G47.33 OBSTRUCTIVE SLEEP APNEA: ICD-10-CM

## 2021-05-17 PROCEDURE — 99213 OFFICE O/P EST LOW 20 MIN: CPT | Performed by: PSYCHIATRY & NEUROLOGY

## 2021-05-17 NOTE — PROGRESS NOTES
Subjective:    CC: Lux Mckoy is seen today  for stroke    HPI:  Current visit-as per patient's wife patient has had a few episodes of word finding problems.  During 1 such episode patient was driving and seemed slightly confused not knowing where to go.  Denies having any deficits now other than fatigue.  He continues to be on aspirin, Eliquis and Lipitor 40 mg daily.  His loop recorder has thus far not showed any atrial fibrillation.  His metabolic panel was normal however testosterone levels were low.  He will be seeing his PCP for further work-up regarding that.  Also saw a sleep specialist and will be getting a sleep study soon.    Last visit-patient denies having any new strokelike symptoms since his last visit but he continues to be extremely tired during the day.  His wife also feels that he has short-term memory problems ever since he had the stroke.  Patient does report to snoring at night.  Continues to take aspirin 81 mg daily along with Eliquis and Lipitor 40 mg daily.  Thus far the loop recorder has not shown any evidence of atrial fibrillation.  He has stopped taking Depakote and has reduced his dose of gabapentin 400 mg to 1 to 2 tablets at night.  Also takes opiates for his back pain and Zoloft for his mood.    Last visit-since the last visit patient had symptoms of bilateral hand tremors with more memory problems in July.  His wife took him to the hospital where he had a MRI scan of the brain which showed chronic bilateral infarcts but no acute abnormalities.  At the hospital patient was started on Eliquis 5 mg twice a day and cardiology also put him a loop recorder.  Prior to that his Holter monitor showed atrial tachycardia but no A. fib.  He was supposed to follow-up with outpatient cardiology but then all of these symptoms occurred.  He also continues to take aspirin 325 mg daily and Lipitor 80 mg daily.  His last lipid panel was as follows-, , LDL 35, HDL 28.  A1c was 5.  At  the hospital he was also started on Depakote  mg 3 times a day for the pain in his legs and mood changes however patient states that his mood is fine and the Depakote does not help with the pain.  He is on gabapentin 300 mg twice a day and his pain physician will increase it to 400 mg 3 times daily soon for his chronic low back pain.  Patient has completely quit smoking now.   Of note-I personally reviewed his MRI scan of the brain and Dr. Davison's notes from the hospital.     Initial hoieo-40-cora-old male with a history of chronic smoking, CKD (secondary to IgA nephropathy), chronic back pain status post 2 neck and 2 low back surgeries presents with a stroke.  As per patient's wife he started having symptoms of staggering gait with slurring of speech, confusion and right-sided numbness around 3/21.  He went to the hospital a day later where he had extensive testing.  CT scan of the head showed a hyper density in the left posterior parietal region.  CT perfusion also showed a perfusion deficit in that area.  CT angiogram of the head and neck showed mild to moderate stenosis in bilateral distal MCAs left more than right as well as atherosclerotic plaques in both carotid bifurcations with no major stenosis.  MRI brain showed bilateral embolic infarcts in multiple vascular territories with the largest one being in the left posterior parietal region.  He had a TTE andTEE both of which showed a normal EF with no valvular abnormalities or PFO as well as no mass.  He also had a stress test which showed normal cardiac perfusion.  Patient states that he had also been having chest pains for the past few months with occasional palpitations.  Troponins were mildly elevated.  He was evaluated by cardiology in the hospital who felt he could be maintained on dual antiplatelets as well as Lipitor 80 mg daily.  His lipid panel was as follows-, , LDL 83, HDL 28.  A1c was 5.4.  Patient was smoking half a pack of  cigarettes every day prior to his stroke but has quit smoking now.  He underwent PT/speech therapy after discharge but has now stopped due to COVID.  As per his wife he continues to get mild memory problems and speech deficits even now.  Of note-I personally reviewed his MRI brain and Dr. Larson's note from the hospital.    The following portions of the patient's history were reviewed today and updated as of 05/21/2020  : allergies, current medications, past family history, past medical history, past social history, past surgical history and problem list  These document will be scanned to patient's chart.      Current Outpatient Medications:   •  acetaminophen (TYLENOL) 325 MG tablet, Take 2 tablets by mouth Every 4 (Four) Hours As Needed for Mild Pain  or Headache., Disp: , Rfl:   •  apixaban (ELIQUIS) 5 MG tablet tablet, Take 1 tablet by mouth Every 12 (Twelve) Hours., Disp: 60 tablet, Rfl: 2  •  aspirin 81 MG EC tablet, Take 1 tablet by mouth Daily., Disp: 150 tablet, Rfl: 2  •  atorvastatin (Lipitor) 40 MG tablet, Take 1 tablet by mouth Daily., Disp: 30 tablet, Rfl: 11  •  azelastine (ASTEPRO) 0.15 % solution nasal spray, 1 Squirt into each nostril 2 (two) times a day., Disp: , Rfl:   •  cetirizine (ZyrTEC) 10 MG tablet, Take 1 tablet by mouth Daily As Needed for allergies., Disp: 30 tablet, Rfl: 2  •  famotidine (PEPCID) 20 MG tablet, Take 1 tablet by mouth Every Night., Disp: , Rfl:   •  folic acid (FOLVITE) 1 MG tablet, Take 1 tablet by mouth Daily., Disp: , Rfl:   •  furosemide (LASIX) 20 MG tablet, Take 1 tablet by mouth Daily As Needed (edema)., Disp: 30 tablet, Rfl: 3  •  gabapentin (NEURONTIN) 400 MG capsule, Take 400 mg by mouth 2 (Two) Times a Day., Disp: , Rfl:   •  lisinopril (PRINIVIL,ZESTRIL) 2.5 MG tablet, Take 2.5 mg by mouth As Needed., Disp: , Rfl:   •  meclizine (ANTIVERT) 25 MG tablet, Take 25 mg by mouth As Needed., Disp: , Rfl:   •  nicotine (NICODERM CQ) 14 MG/24HR patch, Place 1 patch  "on the skin as directed by provider Daily., Disp: 30 each, Rfl: 6  •  omega-3 acid ethyl esters (LOVAZA) 1 g capsule, Take 1 capsule by mouth Daily., Disp: , Rfl:   •  oxyCODONE-acetaminophen (PERCOCET) 7.5-325 MG per tablet, Take 1 tablet by mouth 3 (Three) Times a Day., Disp: , Rfl:   •  pantoprazole (PROTONIX) 40 MG EC tablet, Take 40 mg by mouth Daily., Disp: , Rfl:   •  promethazine (PHENERGAN) 25 MG tablet, TAKE 1 TABLET BY MOUTH EVERY 4 TO 6 HOURS AS NEEDED FOR NAUSEA, Disp: , Rfl:   •  sertraline (ZOLOFT) 100 MG tablet, Take 100 mg by mouth Daily., Disp: , Rfl:   •  tamsulosin (FLOMAX) 0.4 MG capsule 24 hr capsule, Take 1 capsule by mouth As Needed., Disp: , Rfl:    Past Medical History:   Diagnosis Date   • Allergic rhinitis    • Anxiety    • Arthritis    • ASVD (arteriosclerotic vascular disease)    • B12 deficiency    • Salvador's esophagus    • Benign prostate hyperplasia    • Confusion    • CVA (cerebral vascular accident) (CMS/HCC)    • Degeneration of lumbar intervertebral disc    • Degenerative cervical disc    • Depression    • Dupuytren contracture     s/p bilateral hand surgery   • Dyspepsia    • Dyspnea    • Fatigue    • Gastritis    • GERD (gastroesophageal reflux disease)    • Hyperlipidemia    • Hypertension    • IgA nephropathy    • Kidney infection    • Microscopic hematuria    • Migraine    • Muscle spasm    • Nausea    • Pancreatitis    • Prostate disease    • RA (rheumatoid arthritis) (CMS/HCC)    • Serum lipase elevation    • Vitamin D deficiency       Past Surgical History:   Procedure Laterality Date   • CARDIAC ELECTROPHYSIOLOGY PROCEDURE N/A 7/30/2020    Procedure: Loop insertion;  Surgeon: Terrell Da Silva MD;  Location: [facility] INVASIVE LOCATION;  Service: Cardiovascular;  Laterality: N/A;   • CERVICAL DISCECTOMY ANTERIOR  02/2017    C6-7 at [facility]   • HAND SURGERY     • LUMBAR DISCECTOMY      \"Spinal\" times 2 in 2003 and 2005   • UMBILICAL HERNIA REPAIR      Dr." Diego Vasquez      Family History   Problem Relation Age of Onset   • Hypertension Other    • Other Other    • Hypertension Mother    • Heart disease Father    • Hypertension Father    • Diabetes Father    • Arthritis Neg Hx    • Hyperlipidemia Neg Hx       Social History     Socioeconomic History   • Marital status:      Spouse name: Not on file   • Number of children: Not on file   • Years of education: Not on file   • Highest education level: Not on file   Tobacco Use   • Smoking status: Current Every Day Smoker     Packs/day: 0.25     Types: Cigarettes   • Smokeless tobacco: Never Used   Substance and Sexual Activity   • Alcohol use: Never   • Drug use: Never   • Sexual activity: Defer     Review of Systems   Constitutional: Positive for fatigue.   Musculoskeletal: Positive for back pain.   All other systems reviewed and are negative.      Objective:    There were no vitals taken for this visit.    Neurology Exam:    General apperance: NAD.     Mental status: Alert, awake and oriented to time place and person.  Could tell me the month, year, his date of birth and his full address.    Recent and Remote memory: Intact.    Attention span and Concentration: Normal.     Language and Speech: Intact- No dysarthria.    Fluency, Naming , Repitition and Comprehension:  Intact    Cranial Nerves:   CN II: Visual fields are full. Intact. Fundi - Normal, No papillederma, Pupils - ELISA  CN III, IV and VI: Extraocular movements are intact. Normal saccades.   CN V: Facial sensation is intact.   CN VII: Muscles of facial expression reveal no asymmetry. Intact.   CN VIII: Hearing is intact. Whispered voice intact.   CN IX and X: Palate elevates symmetrically. Intact  CN XI: Shoulder shrug is intact.   CN XII: Tongue is midline without evidence of atrophy or fasciculation.       Motor:   5/5 in bilateral upper extremities with normal tone  5/5 in right lower extremity   5/5 in left lower extremity    Co-ordination: Normal  finger-to-nose, heel to shin B/L.    Gait: Not checked    Assessment and Plan:  1. Sequelae, post-stroke  Patient had bilateral embolic infarcts which could be due to paroxysmal atrial fibrillation.   He is currently on aspirin 81 mg and Eliquis 5 mg twice a day and also has a loop recorder in place.    Currently on Lipitor  40 mg daily for goal LDL of less than 100.  His LDL was 35 however triglycerides are still elevated at 265.   Maintain blood pressure less than 130/90.  His blood pressure is well controlled  I discussed his current symptoms in length with him and his wife.  I feel it could be due to his previous stroke further worsened by his generalized fatigue.  I may do a more detailed MMSE at his next visit    2.  Fatigue  Possibly due to a combination of sleep apnea and low testosterone levels.  Further work-up pending    3.  Sleep apnea  Sleep test pending    4.  Smoking  He states he has quit smoking    Return in about 3 months (around 8/17/2021).       Nani Carmona MD

## 2021-05-18 RX ORDER — ATORVASTATIN CALCIUM 40 MG/1
40 TABLET, FILM COATED ORAL DAILY
Qty: 30 TABLET | Refills: 11 | Status: SHIPPED | OUTPATIENT
Start: 2021-05-18 | End: 2021-08-30

## 2021-05-18 RX ORDER — ASPIRIN 81 MG/1
81 TABLET ORAL DAILY
Qty: 150 TABLET | Refills: 2 | Status: SHIPPED | OUTPATIENT
Start: 2021-05-18 | End: 2022-05-18

## 2021-05-20 ENCOUNTER — TELEPHONE (OUTPATIENT)
Dept: NEUROLOGY | Facility: CLINIC | Age: 51
End: 2021-05-20

## 2021-05-20 NOTE — TELEPHONE ENCOUNTER
Caller: Meera Mckoy    Relationship: Emergency Contact    Best call back number: [phone]    What medications are you currently taking:   Current Outpatient Medications on File Prior to Visit   Medication Sig Dispense Refill   • acetaminophen (TYLENOL) 325 MG tablet Take 2 tablets by mouth Every 4 (Four) Hours As Needed for Mild Pain  or Headache.     • apixaban (ELIQUIS) 5 MG tablet tablet Take 1 tablet by mouth Every 12 (Twelve) Hours. 60 tablet 2   • aspirin 81 MG EC tablet Take 1 tablet by mouth Daily. 150 tablet 2   • atorvastatin (Lipitor) 40 MG tablet Take 1 tablet by mouth Daily. 30 tablet 11   • azelastine (ASTEPRO) 0.15 % solution nasal spray 1 Squirt into each nostril 2 (two) times a day.     • cetirizine (ZyrTEC) 10 MG tablet Take 1 tablet by mouth Daily As Needed for allergies. 30 tablet 2   • famotidine (PEPCID) 20 MG tablet Take 1 tablet by mouth Every Night.     • folic acid (FOLVITE) 1 MG tablet Take 1 tablet by mouth Daily.     • furosemide (LASIX) 20 MG tablet Take 1 tablet by mouth Daily As Needed (edema). 30 tablet 3   • gabapentin (NEURONTIN) 400 MG capsule Take 400 mg by mouth 2 (Two) Times a Day.     • lisinopril (PRINIVIL,ZESTRIL) 2.5 MG tablet Take 2.5 mg by mouth As Needed.     • meclizine (ANTIVERT) 25 MG tablet Take 25 mg by mouth As Needed.     • nicotine (NICODERM CQ) 14 MG/24HR patch Place 1 patch on the skin as directed by provider Daily. 30 each 6   • omega-3 acid ethyl esters (LOVAZA) 1 g capsule Take 1 capsule by mouth Daily.     • oxyCODONE-acetaminophen (PERCOCET) 7.5-325 MG per tablet Take 1 tablet by mouth 3 (Three) Times a Day.     • pantoprazole (PROTONIX) 40 MG EC tablet Take 40 mg by mouth Daily.     • promethazine (PHENERGAN) 25 MG tablet TAKE 1 TABLET BY MOUTH EVERY 4 TO 6 HOURS AS NEEDED FOR NAUSEA     • sertraline (ZOLOFT) 100 MG tablet Take 100 mg by mouth Daily.     • tamsulosin (FLOMAX) 0.4 MG capsule 24 hr capsule Take 1 capsule by mouth As Needed.       No  current facility-administered medications on file prior to visit.        When did you start taking these medications: NA    Which medication are you concerned about: ASPIRIN & ELIQUIS    Who prescribed you this medication:     What are your concerns: PATIENTS WIFE STATES SUSAN HAS A LOWER EPIDURAL COMING UP ON 6-1-21 AND THEY ARE WANTING TO KNOW IF ITS SAFE TO BE OFF THESE TWO MEDICATIONS FOR 7 DAYS. PLEASE ADVISE.     How long have you been taking these medications: NA    How long have you had these concerns: NA

## 2021-05-20 NOTE — TELEPHONE ENCOUNTER
Tell the patient's wife that stopping the Eliquis for 7 days does put him at a risk for stroke.  He can speak to the surgeon and patient could be started on Lovenox shots (which is another type of blood thinner) for the time that he is off of Eliquis and the last shot can be taken up to 1 day prior to the procedure.

## 2021-05-21 NOTE — TELEPHONE ENCOUNTER
Spoke to patient's wife and informed her that stopping Eliquis for 7 days puts the patient at risk for a stroke, and should contact the surgeon about starting Lovenox Injection in place of the Eliquis until the day after the procedure.

## 2021-05-28 ENCOUNTER — HOSPITAL ENCOUNTER (OUTPATIENT)
Dept: SLEEP MEDICINE | Facility: HOSPITAL | Age: 51
Discharge: HOME | End: 2021-05-28
Admitting: INTERNAL MEDICINE

## 2021-05-28 VITALS — HEIGHT: 74 IN | BODY MASS INDEX: 27.53 KG/M2 | WEIGHT: 214.49 LBS

## 2021-05-28 DIAGNOSIS — G47.33 OBSTRUCTIVE SLEEP APNEA, ADULT: ICD-10-CM

## 2021-05-28 DIAGNOSIS — R06.83 SNORING: ICD-10-CM

## 2021-05-28 PROCEDURE — 95806 SLEEP STUDY UNATT&RESP EFFT: CPT | Performed by: INTERNAL MEDICINE

## 2021-05-28 PROCEDURE — 95806 SLEEP STUDY UNATT&RESP EFFT: CPT

## 2021-06-02 DIAGNOSIS — E66.3 OVERWEIGHT: ICD-10-CM

## 2021-06-02 DIAGNOSIS — G47.33 OBSTRUCTIVE SLEEP APNEA, ADULT: Primary | ICD-10-CM

## 2021-06-02 DIAGNOSIS — I10 ESSENTIAL HYPERTENSION: ICD-10-CM

## 2021-06-02 DIAGNOSIS — R06.83 SNORING: ICD-10-CM

## 2021-06-03 NOTE — PROGRESS NOTES
CALLED PATIENT AND ADVISED OF STUDY RESULTS. PATIENT VERBALIZED UNDERSTANDING AND WAS AGREEABLE TO PAP THERAPY. FAXED ORDER TO [facility] 06/03/21 VICKY

## 2021-07-17 PROCEDURE — G2066 INTER DEVC REMOTE 30D: HCPCS | Performed by: INTERNAL MEDICINE

## 2021-07-17 PROCEDURE — 93298 REM INTERROG DEV EVAL SCRMS: CPT | Performed by: INTERNAL MEDICINE

## 2021-07-26 DIAGNOSIS — I69.30 SEQUELAE, POST-STROKE: ICD-10-CM

## 2021-07-27 RX ORDER — APIXABAN 5 MG/1
TABLET, FILM COATED ORAL
Qty: 60 TABLET | Refills: 2 | Status: SHIPPED | OUTPATIENT
Start: 2021-07-27 | End: 2021-11-04

## 2021-08-10 ENCOUNTER — HOSPITAL ENCOUNTER (EMERGENCY)
Age: 51
Discharge: HOME | End: 2021-08-10
Payer: MEDICAID

## 2021-08-10 VITALS
SYSTOLIC BLOOD PRESSURE: 138 MMHG | OXYGEN SATURATION: 97 % | TEMPERATURE: 98.2 F | HEART RATE: 74 BPM | RESPIRATION RATE: 14 BRPM | DIASTOLIC BLOOD PRESSURE: 90 MMHG

## 2021-08-10 VITALS
SYSTOLIC BLOOD PRESSURE: 143 MMHG | HEART RATE: 65 BPM | OXYGEN SATURATION: 97 % | DIASTOLIC BLOOD PRESSURE: 73 MMHG | RESPIRATION RATE: 18 BRPM | TEMPERATURE: 98.1 F

## 2021-08-10 VITALS — BODY MASS INDEX: 27.7 KG/M2

## 2021-08-10 VITALS — HEART RATE: 65 BPM | OXYGEN SATURATION: 97 % | SYSTOLIC BLOOD PRESSURE: 164 MMHG | DIASTOLIC BLOOD PRESSURE: 106 MMHG

## 2021-08-10 DIAGNOSIS — Z86.73: ICD-10-CM

## 2021-08-10 DIAGNOSIS — K64.9: ICD-10-CM

## 2021-08-10 DIAGNOSIS — K60.0: Primary | ICD-10-CM

## 2021-08-10 DIAGNOSIS — K62.5: ICD-10-CM

## 2021-08-10 LAB
ALBUMIN LEVEL: 4.5 G/DL (ref 3.5–5)
ALBUMIN/GLOB SERPL: 1.6 {RATIO} (ref 1.1–1.8)
ALP ISO SERPL-ACNC: 92 U/L (ref 38–126)
ALT SERPLBLD-CCNC: 24 U/L (ref 12–78)
ANION GAP SERPL CALC-SCNC: 13.2 MEQ/L (ref 5–15)
AST SERPL QL: 38 U/L (ref 17–59)
BILIRUBIN,TOTAL: 0.7 MG/DL (ref 0.2–1.3)
BUN SERPL-MCNC: 15 MG/DL (ref 9–20)
CALCIUM SPEC-MCNC: 9 MG/DL (ref 8.4–10.2)
CHLORIDE SPEC-SCNC: 108 MMOL/L (ref 98–107)
CO2 SERPL-SCNC: 24 MMOL/L (ref 22–30)
CREAT BLD-SCNC: 1.6 MG/DL (ref 0.66–1.25)
CREATININE CLEARANCE ESTIMATED: 77 ML/MIN (ref 50–200)
ESTIMATED GLOMERULAR FILT RATE: 46 ML/MIN (ref 60–?)
GFR (AFRICAN AMERICAN): 56 ML/MIN (ref 60–?)
GLOBULIN SER CALC-MCNC: 2.9 G/DL (ref 1.3–3.2)
GLUCOSE: 84 MG/DL (ref 74–100)
HCT VFR BLD CALC: 46 % (ref 42–52)
HGB BLD-MCNC: 15.9 G/DL (ref 14.1–18)
MCHC RBC-ENTMCNC: 34.6 G/DL (ref 31.8–35.4)
MCV RBC: 87.3 FL (ref 80–94)
MEAN CORPUSCULAR HEMOGLOBIN: 30.2 PG (ref 27–31.2)
PLATELET # BLD: 270 K/MM3 (ref 142–424)
POTASSIUM: 4.2 MMOL/L (ref 3.5–5.1)
PROT SERPL-MCNC: 7.4 G/DL (ref 6.3–8.2)
RBC # BLD AUTO: 5.27 M/MM3 (ref 4.6–6.2)
SODIUM SPEC-SCNC: 141 MMOL/L (ref 136–145)
WBC # BLD AUTO: 10.1 K/MM3 (ref 4.8–10.8)

## 2021-08-10 PROCEDURE — 99283 EMERGENCY DEPT VISIT LOW MDM: CPT

## 2021-08-10 PROCEDURE — 80053 COMPREHEN METABOLIC PANEL: CPT

## 2021-08-10 PROCEDURE — 85025 COMPLETE CBC W/AUTO DIFF WBC: CPT

## 2021-08-17 PROCEDURE — 93298 REM INTERROG DEV EVAL SCRMS: CPT | Performed by: INTERNAL MEDICINE

## 2021-08-17 PROCEDURE — G2066 INTER DEVC REMOTE 30D: HCPCS | Performed by: INTERNAL MEDICINE

## 2021-08-30 RX ORDER — ATORVASTATIN CALCIUM 40 MG/1
TABLET, FILM COATED ORAL
Qty: 30 TABLET | Refills: 11 | Status: SHIPPED | OUTPATIENT
Start: 2021-08-30

## 2021-09-17 PROCEDURE — G2066 INTER DEVC REMOTE 30D: HCPCS | Performed by: INTERNAL MEDICINE

## 2021-09-17 PROCEDURE — 93298 REM INTERROG DEV EVAL SCRMS: CPT | Performed by: INTERNAL MEDICINE

## 2021-09-24 ENCOUNTER — TELEMEDICINE (OUTPATIENT)
Dept: SLEEP MEDICINE | Facility: HOSPITAL | Age: 51
End: 2021-09-24

## 2021-09-24 VITALS — HEIGHT: 74 IN | WEIGHT: 215 LBS | BODY MASS INDEX: 27.59 KG/M2

## 2021-09-24 DIAGNOSIS — G47.33 OSA (OBSTRUCTIVE SLEEP APNEA): Primary | ICD-10-CM

## 2021-09-24 PROCEDURE — 99213 OFFICE O/P EST LOW 20 MIN: CPT | Performed by: NURSE PRACTITIONER

## 2021-09-24 NOTE — PROGRESS NOTES
Chief Complaint:   Chief Complaint   Patient presents with   • Follow-up       HPI:    Lux Mckoy is a 51 y.o. male here for follow-up of sleep apnea.  Patient has a history of dyslipidemia, hypertension, CVA, ASVD, vertigo, hypergonadism, GERD, chronic prostatitis, neuropathy, BPH, depression, anxiety, osteoarthritis, RA, chronic pain, migraines and recently sleep apnea.  He was seen here in consult complaining of nonrestorative sleep, snoring, gasping and witnessed apneas.  His sleep study 5/28/2021 did show moderate obstructive sleep apnea.  Patient states he can sleep 8 to 12 hours nightly and is still not rested upon awakening.  He will get up several times during the night but goes to sleep easily.  He has an Rushford score of 9/24.  Patient is currently not using CPAP due to the Marisa recall.  We did discuss consequences of untreated sleep apnea versus health history patient wishes to put on hold for now and not restart until he receives his new machine.        Current medications are:   Current Outpatient Medications:   •  acetaminophen (TYLENOL) 325 MG tablet, Take 2 tablets by mouth Every 4 (Four) Hours As Needed for Mild Pain  or Headache., Disp: , Rfl:   •  aspirin 81 MG EC tablet, Take 1 tablet by mouth Daily., Disp: 150 tablet, Rfl: 2  •  atorvastatin (LIPITOR) 40 MG tablet, TAKE 1 TABLET BY MOUTH EVERY DAY, Disp: 30 tablet, Rfl: 11  •  azelastine (ASTEPRO) 0.15 % solution nasal spray, 1 Squirt into each nostril 2 (two) times a day., Disp: , Rfl:   •  cetirizine (ZyrTEC) 10 MG tablet, Take 1 tablet by mouth Daily As Needed for allergies., Disp: 30 tablet, Rfl: 2  •  Eliquis 5 MG tablet tablet, TAKE 1 TABLET BY MOUTH EVERY 12 HOURS, Disp: 60 tablet, Rfl: 2  •  famotidine (PEPCID) 20 MG tablet, Take 1 tablet by mouth Every Night., Disp: , Rfl:   •  folic acid (FOLVITE) 1 MG tablet, Take 1 tablet by mouth Daily., Disp: , Rfl:   •  furosemide (LASIX) 20 MG tablet, Take 1 tablet by mouth Daily As  Needed (edema)., Disp: 30 tablet, Rfl: 3  •  gabapentin (NEURONTIN) 400 MG capsule, Take 400 mg by mouth 2 (Two) Times a Day., Disp: , Rfl:   •  lisinopril (PRINIVIL,ZESTRIL) 2.5 MG tablet, Take 2.5 mg by mouth As Needed., Disp: , Rfl:   •  meclizine (ANTIVERT) 25 MG tablet, Take 25 mg by mouth As Needed., Disp: , Rfl:   •  nicotine (NICODERM CQ) 14 MG/24HR patch, Place 1 patch on the skin as directed by provider Daily., Disp: 30 each, Rfl: 6  •  omega-3 acid ethyl esters (LOVAZA) 1 g capsule, Take 1 capsule by mouth Daily., Disp: , Rfl:   •  oxyCODONE-acetaminophen (PERCOCET) 7.5-325 MG per tablet, Take 1 tablet by mouth 3 (Three) Times a Day., Disp: , Rfl:   •  pantoprazole (PROTONIX) 40 MG EC tablet, Take 40 mg by mouth Daily., Disp: , Rfl:   •  promethazine (PHENERGAN) 25 MG tablet, TAKE 1 TABLET BY MOUTH EVERY 4 TO 6 HOURS AS NEEDED FOR NAUSEA, Disp: , Rfl:   •  sertraline (ZOLOFT) 100 MG tablet, Take 100 mg by mouth Daily., Disp: , Rfl:   •  tamsulosin (FLOMAX) 0.4 MG capsule 24 hr capsule, Take 1 capsule by mouth As Needed., Disp: , Rfl: .      The patient's relevant past medical, surgical, family and social history were reviewed and updated in Epic as appropriate.       Review of Systems   Constitutional: Positive for activity change.   HENT: Positive for hearing loss, sinus pressure, sinus pain, sneezing and trouble swallowing.    Eyes: Positive for itching.   Respiratory: Positive for apnea, cough, choking and wheezing.    Cardiovascular: Positive for leg swelling.   Gastrointestinal: Positive for nausea and rectal pain.   Genitourinary: Positive for difficulty urinating, testicular pain and urgency.   Musculoskeletal: Positive for arthralgias, back pain, gait problem, joint swelling and myalgias.   Allergic/Immunologic: Positive for environmental allergies.   Neurological: Positive for weakness, light-headedness and headaches.   Psychiatric/Behavioral: Positive for decreased concentration and sleep  disturbance.   All other systems reviewed and are negative.        Objective:    Physical Exam  Constitutional:       Appearance: Normal appearance.   HENT:      Head: Normocephalic and atraumatic.   Pulmonary:      Effort: Pulmonary effort is normal. No respiratory distress.   Neurological:      Mental Status: He is alert and oriented to person, place, and time.   Psychiatric:         Mood and Affect: Mood normal.         Behavior: Behavior normal.         Thought Content: Thought content normal.         Judgment: Judgment normal.       Not using    ASSESSMENT/PLAN    Diagnoses and all orders for this visit:    1. ANI (obstructive sleep apnea) (Primary)  -     CPAP Therapy            1. Counseled patient regarding multimodal approach with healthy nutrition, healthy sleep, regular physical activity, social activities, counseling, and medications. Encouraged to practice  Lateral sleep position. Avoid alcohol and sedatives close to bedtime.  2. Patient will call me once he receives his new machine and does not want to restart and we will get him scheduled accordingly patient gave verbal consent for video visit.    I have reviewed the results of my evaluation and impression and discussed my recommendations in detail with the patient.      Signed by  ALDO Al    September 24, 2021      CC: Emani Alas PA          No ref. provider found

## 2021-10-14 ENCOUNTER — LAB (OUTPATIENT)
Dept: LAB | Facility: HOSPITAL | Age: 51
End: 2021-10-14

## 2021-10-14 ENCOUNTER — OFFICE VISIT (OUTPATIENT)
Dept: NEUROLOGY | Facility: CLINIC | Age: 51
End: 2021-10-14

## 2021-10-14 VITALS
OXYGEN SATURATION: 98 % | WEIGHT: 207.6 LBS | HEART RATE: 85 BPM | DIASTOLIC BLOOD PRESSURE: 78 MMHG | HEIGHT: 74 IN | SYSTOLIC BLOOD PRESSURE: 118 MMHG | BODY MASS INDEX: 26.64 KG/M2

## 2021-10-14 DIAGNOSIS — G47.33 OBSTRUCTIVE SLEEP APNEA: ICD-10-CM

## 2021-10-14 DIAGNOSIS — Z86.73 HISTORY OF STROKE: Primary | ICD-10-CM

## 2021-10-14 DIAGNOSIS — F17.200 SMOKER: ICD-10-CM

## 2021-10-14 DIAGNOSIS — G31.84 MILD COGNITIVE IMPAIRMENT: ICD-10-CM

## 2021-10-14 DIAGNOSIS — Z86.73 HISTORY OF STROKE: ICD-10-CM

## 2021-10-14 PROCEDURE — 99214 OFFICE O/P EST MOD 30 MIN: CPT | Performed by: PSYCHIATRY & NEUROLOGY

## 2021-10-14 PROCEDURE — 80061 LIPID PANEL: CPT

## 2021-10-14 PROCEDURE — 36415 COLL VENOUS BLD VENIPUNCTURE: CPT

## 2021-10-14 NOTE — PROGRESS NOTES
Subjective:    CC: Lux Mckoy is seen today  for stroke    HPI:  Current visit-patient states that he continues to have memory problems where he has difficulty recalling names and words.  He also still has fatigue.  Denies any more episodes of confusion, weakness or numbness.  Continues to take Eliquis in addition to aspirin and Lipitor 40 mg.  He had his sleep study that showed moderate obstructive sleep apnea for which he was given a CPAP however it was recalled recently due to a faulty filter but he was told that he could start using it again as it was a minor fall.  Patient also had repeat testosterone levels that were on the lower side of normal (in the 200s).  He continues to smoke about half a pack of cigarettes daily.  Has lost some weight recently.    Last visit-patient denies having any new strokelike symptoms since his last visit but he continues to be extremely tired during the day.  His wife also feels that he has short-term memory problems ever since he had the stroke.  Patient does report to snoring at night.  Continues to take aspirin 81 mg daily along with Eliquis and Lipitor 40 mg daily.  Thus far the loop recorder has not shown any evidence of atrial fibrillation.  He has stopped taking Depakote and has reduced his dose of gabapentin 400 mg to 1 to 2 tablets at night.  Also takes opiates for his back pain and Zoloft for his mood.    Last visit-since the last visit patient had symptoms of bilateral hand tremors with more memory problems in July.  His wife took him to the hospital where he had a MRI scan of the brain which showed chronic bilateral infarcts but no acute abnormalities.  At the hospital patient was started on Eliquis 5 mg twice a day and cardiology also put him a loop recorder.  Prior to that his Holter monitor showed atrial tachycardia but no A. fib.  He was supposed to follow-up with outpatient cardiology but then all of these symptoms occurred.  He also continues to take  aspirin 325 mg daily and Lipitor 80 mg daily.  His last lipid panel was as follows-, , LDL 35, HDL 28.  A1c was 5.  At the hospital he was also started on Depakote  mg 3 times a day for the pain in his legs and mood changes however patient states that his mood is fine and the Depakote does not help with the pain.  He is on gabapentin 300 mg twice a day and his pain physician will increase it to 400 mg 3 times daily soon for his chronic low back pain.  Patient has completely quit smoking now.   Of note-I personally reviewed his MRI scan of the brain and Dr. Davison's notes from the hospital.     Initial [id number] male with a history of chronic smoking, CKD (secondary to IgA nephropathy), chronic back pain status post 2 neck and 2 low back surgeries presents with a stroke.  As per patient's wife he started having symptoms of staggering gait with slurring of speech, confusion and right-sided numbness around 3/21.  He went to the hospital a day later where he had extensive testing.  CT scan of the head showed a hyper density in the left posterior parietal region.  CT perfusion also showed a perfusion deficit in that area.  CT angiogram of the head and neck showed mild to moderate stenosis in bilateral distal MCAs left more than right as well as atherosclerotic plaques in both carotid bifurcations with no major stenosis.  MRI brain showed bilateral embolic infarcts in multiple vascular territories with the largest one being in the left posterior parietal region.  He had a TTE andTEE both of which showed a normal EF with no valvular abnormalities or PFO as well as no mass.  He also had a stress test which showed normal cardiac perfusion.  Patient states that he had also been having chest pains for the past few months with occasional palpitations.  Troponins were mildly elevated.  He was evaluated by cardiology in the hospital who felt he could be maintained on dual antiplatelets as well as Lipitor  80 mg daily.  His lipid panel was as follows-, , LDL 83, HDL 28.  A1c was 5.4.  Patient was smoking half a pack of cigarettes every day prior to his stroke but has quit smoking now.  He underwent PT/speech therapy after discharge but has now stopped due to COVID.  As per his wife he continues to get mild memory problems and speech deficits even now.  Of note-I personally reviewed his MRI brain and Dr. Larson's note from the hospital.    The following portions of the patient's history were reviewed today and updated as of 05/21/2020  : allergies, current medications, past family history, past medical history, past social history, past surgical history and problem list  These document will be scanned to patient's chart.      Current Outpatient Medications:   •  acetaminophen (TYLENOL) 325 MG tablet, Take 2 tablets by mouth Every 4 (Four) Hours As Needed for Mild Pain  or Headache., Disp: , Rfl:   •  aspirin 81 MG EC tablet, Take 1 tablet by mouth Daily., Disp: 150 tablet, Rfl: 2  •  atorvastatin (LIPITOR) 40 MG tablet, TAKE 1 TABLET BY MOUTH EVERY DAY, Disp: 30 tablet, Rfl: 11  •  azelastine (ASTEPRO) 0.15 % solution nasal spray, 1 Squirt into each nostril 2 (two) times a day., Disp: , Rfl:   •  cetirizine (ZyrTEC) 10 MG tablet, Take 1 tablet by mouth Daily As Needed for allergies., Disp: 30 tablet, Rfl: 2  •  Eliquis 5 MG tablet tablet, TAKE 1 TABLET BY MOUTH EVERY 12 HOURS, Disp: 60 tablet, Rfl: 2  •  famotidine (PEPCID) 20 MG tablet, Take 1 tablet by mouth Every Night., Disp: , Rfl:   •  folic acid (FOLVITE) 1 MG tablet, Take 1 tablet by mouth Daily., Disp: , Rfl:   •  furosemide (LASIX) 20 MG tablet, Take 1 tablet by mouth Daily As Needed (edema)., Disp: 30 tablet, Rfl: 3  •  gabapentin (NEURONTIN) 400 MG capsule, Take 400 mg by mouth 2 (Two) Times a Day., Disp: , Rfl:   •  lisinopril (PRINIVIL,ZESTRIL) 2.5 MG tablet, Take 2.5 mg by mouth As Needed., Disp: , Rfl:   •  meclizine (ANTIVERT) 25 MG  "tablet, Take 25 mg by mouth As Needed., Disp: , Rfl:   •  nicotine (NICODERM CQ) 14 MG/24HR patch, Place 1 patch on the skin as directed by provider Daily., Disp: 30 each, Rfl: 6  •  omega-3 acid ethyl esters (LOVAZA) 1 g capsule, Take 1 capsule by mouth Daily., Disp: , Rfl:   •  oxyCODONE-acetaminophen (PERCOCET) 7.5-325 MG per tablet, Take 1 tablet by mouth 3 (Three) Times a Day., Disp: , Rfl:   •  pantoprazole (PROTONIX) 40 MG EC tablet, Take 40 mg by mouth Daily., Disp: , Rfl:   •  promethazine (PHENERGAN) 25 MG tablet, TAKE 1 TABLET BY MOUTH EVERY 4 TO 6 HOURS AS NEEDED FOR NAUSEA, Disp: , Rfl:   •  tamsulosin (FLOMAX) 0.4 MG capsule 24 hr capsule, Take 1 capsule by mouth As Needed., Disp: , Rfl:    Past Medical History:   Diagnosis Date   • Allergic rhinitis    • Anxiety    • Arthritis    • ASVD (arteriosclerotic vascular disease)    • B12 deficiency    • Salvador's esophagus    • Benign prostate hyperplasia    • Confusion    • CVA (cerebral vascular accident) ([facility])    • Degeneration of lumbar intervertebral disc    • Degenerative cervical disc    • Depression    • Dupuytren contracture     s/p bilateral hand surgery   • Dyspepsia    • Dyspnea    • Fatigue    • Gastritis    • GERD (gastroesophageal reflux disease)    • Hyperlipidemia    • Hypertension    • IgA nephropathy    • Kidney infection    • Microscopic hematuria    • Migraine    • Muscle spasm    • Nausea    • Pancreatitis    • Prostate disease    • RA (rheumatoid arthritis) ([facility])    • Serum lipase elevation    • Vitamin D deficiency       Past Surgical History:   Procedure Laterality Date   • CARDIAC ELECTROPHYSIOLOGY PROCEDURE N/A 7/30/2020    Procedure: Loop insertion;  Surgeon: Terrell Da Silva MD;  Location: [facility] INVASIVE LOCATION;  Service: Cardiovascular;  Laterality: N/A;   • CERVICAL DISCECTOMY ANTERIOR  02/2017    C6-7 at [facility]   • HAND SURGERY     • LUMBAR DISCECTOMY      \"Spinal\" times 2 in 2003 and 2005   • UMBILICAL " "HERNIA REPAIR      Dr. Diego Vasquez      Family History   Problem Relation Age of Onset   • Hypertension Other    • Other Other    • Hypertension Mother    • Heart disease Father    • Hypertension Father    • Diabetes Father    • Arthritis Neg Hx    • Hyperlipidemia Neg Hx       Social History     Socioeconomic History   • Marital status:    Tobacco Use   • Smoking status: Current Every Day Smoker     Packs/day: 0.25     Types: Cigarettes   • Smokeless tobacco: Never Used   Vaping Use   • Vaping Use: Never used   Substance and Sexual Activity   • Alcohol use: Never   • Drug use: Never   • Sexual activity: Defer     Review of Systems   Constitutional: Positive for fatigue.   Musculoskeletal: Positive for back pain.   Neurological: Positive for memory problem.   All other systems reviewed and are negative.      Objective:    /78   Pulse 85   Ht 188 cm (74\")   Wt 94.2 kg (207 lb 9.6 oz)   SpO2 98%   BMI 26.65 kg/m²     Neurology Exam:    General apperance: NAD.     Mental status: Alert, awake and oriented to time place and person.    Recent and Remote memory: Intact.  Delayed recall of 2/3    Attention span and Concentration: Normal.     Language and Speech: Intact- No dysarthria.    Fluency, Naming , Repitition and Comprehension:  Intact    Cranial Nerves:   CN II: Visual fields are full. Intact. Fundi - Normal, No papillederma, Pupils - ELISA  CN III, IV and VI: Extraocular movements are intact. Normal saccades.   CN V: Facial sensation is intact.   CN VII: Muscles of facial expression reveal no asymmetry. Intact.   CN VIII: Hearing is intact. Whispered voice intact.   CN IX and X: Palate elevates symmetrically. Intact  CN XI: Shoulder shrug is intact.   CN XII: Tongue is midline without evidence of atrophy or fasciculation.       Motor:  5/5 in all extremities    Sensory- mildly reduced to light touch in right upper extremity    Co-ordination: Normal finger-to-nose, heel to shin B/L.    Rhomberg: " Negative.    Gait: Normal    Assessment and Plan:  1. Sequelae, post-stroke  Patient had bilateral embolic infarcts which could be due to paroxysmal atrial fibrillation.  Thus far the loop recorder has not shown any episodes of A. Fib.  I explained to him that his mild memory problems may be due to his stroke.  He had bilateral embolic infarcts however the largest one was in the left parietal region which initially caused some aphasia.  I will give him a speech/cognitive therapy referral  He is currently on aspirin 81 mg and Eliquis 5 mg twice a day   I have asked him to continue Lipitor  40 mg daily for goal LDL of less than 100.  His LDL was 35 however triglycerides are still elevated at 265.  I will recheck a lipid panel today  Maintain blood pressure less than 130/90.  His blood pressure is well controlled      2.  Fatigue  Is most likely due to underlying sleep apnea.  I have told him to start wearing his CPAP  His testosterone levels are also on the lower side of normal but he should avoid taking testosterone as that can make him hypercoagulable    3.  Mild cognitive impairment  Most likely due to his stroke.  I will give him a speech therapy referral    4.Smoking  He is currently smoking half a pack a day.  I have told him to quit smoking    Return in about 4 months (around 2/14/2022).       Nani Carmona MD

## 2021-10-15 ENCOUNTER — TELEPHONE (OUTPATIENT)
Dept: CARDIOLOGY | Facility: CLINIC | Age: 51
End: 2021-10-15

## 2021-10-15 LAB
CHOLEST SERPL-MCNC: 124 MG/DL (ref 0–200)
HDLC SERPL-MCNC: 27 MG/DL (ref 40–60)
LDLC SERPL CALC-MCNC: 63 MG/DL (ref 0–100)
LDLC/HDLC SERPL: 2.07 {RATIO}
TRIGL SERPL-MCNC: 206 MG/DL (ref 0–150)
VLDLC SERPL-MCNC: 34 MG/DL (ref 5–40)

## 2021-10-15 NOTE — TELEPHONE ENCOUNTER
Per his most recent visit with neurology testosterone replacement not recommended due to risk of hypercoagulable state.  He has a history of prior stroke

## 2021-10-15 NOTE — TELEPHONE ENCOUNTER
Surgical/Cardiac Clearance needed:    Procedure: Testosterone Replacement Therapy (Initiation)    Provider: ALDO Baker     Date: Not scheduled yet    Pertinent Medications:      Contact Info: Talya  -Phone: [phone]  -Fax: [phone]    Please Advise

## 2021-11-04 DIAGNOSIS — I69.30 SEQUELAE, POST-STROKE: ICD-10-CM

## 2021-11-04 RX ORDER — APIXABAN 5 MG/1
TABLET, FILM COATED ORAL
Qty: 60 TABLET | Refills: 2 | Status: SHIPPED | OUTPATIENT
Start: 2021-11-04 | End: 2022-03-07

## 2021-11-18 PROCEDURE — 93298 REM INTERROG DEV EVAL SCRMS: CPT | Performed by: INTERNAL MEDICINE

## 2021-11-18 PROCEDURE — G2066 INTER DEVC REMOTE 30D: HCPCS | Performed by: INTERNAL MEDICINE

## 2021-12-06 RX ORDER — FUROSEMIDE 20 MG/1
20 TABLET ORAL DAILY PRN
Qty: 30 TABLET | Refills: 3 | Status: SHIPPED | OUTPATIENT
Start: 2021-12-06 | End: 2022-03-24

## 2021-12-19 PROCEDURE — 93298 REM INTERROG DEV EVAL SCRMS: CPT | Performed by: INTERNAL MEDICINE

## 2021-12-19 PROCEDURE — G2066 INTER DEVC REMOTE 30D: HCPCS | Performed by: INTERNAL MEDICINE

## 2022-01-10 ENCOUNTER — HOSPITAL ENCOUNTER (OUTPATIENT)
Age: 52
End: 2022-01-10
Payer: MEDICAID

## 2022-01-10 DIAGNOSIS — Z20.822: Primary | ICD-10-CM

## 2022-01-10 PROCEDURE — U0003 INFECTIOUS AGENT DETECTION BY NUCLEIC ACID (DNA OR RNA); SEVERE ACUTE RESPIRATORY SYNDROME CORONAVIRUS 2 (SARS-COV-2) (CORONAVIRUS DISEASE [COVID-19]), AMPLIFIED PROBE TECHNIQUE, MAKING USE OF HIGH THROUGHPUT TECHNOLOGIES AS DESCRIBED BY CMS-2020-01-R: HCPCS

## 2022-01-10 PROCEDURE — C9803 HOPD COVID-19 SPEC COLLECT: HCPCS

## 2022-01-10 PROCEDURE — U0005 INFEC AGEN DETEC AMPLI PROBE: HCPCS

## 2022-01-15 ENCOUNTER — HOSPITAL ENCOUNTER (EMERGENCY)
Age: 52
Discharge: HOME | End: 2022-01-15
Payer: MEDICAID

## 2022-01-15 VITALS
DIASTOLIC BLOOD PRESSURE: 93 MMHG | TEMPERATURE: 98.4 F | SYSTOLIC BLOOD PRESSURE: 134 MMHG | HEART RATE: 87 BPM | RESPIRATION RATE: 18 BRPM

## 2022-01-15 VITALS — BODY MASS INDEX: 27.3 KG/M2

## 2022-01-15 VITALS
DIASTOLIC BLOOD PRESSURE: 93 MMHG | OXYGEN SATURATION: 97 % | HEART RATE: 87 BPM | TEMPERATURE: 98.42 F | SYSTOLIC BLOOD PRESSURE: 134 MMHG | RESPIRATION RATE: 18 BRPM

## 2022-01-15 DIAGNOSIS — B34.9: Primary | ICD-10-CM

## 2022-01-15 DIAGNOSIS — J02.9: ICD-10-CM

## 2022-01-15 DIAGNOSIS — Z20.822: ICD-10-CM

## 2022-01-15 PROCEDURE — 99202 OFFICE O/P NEW SF 15 MIN: CPT

## 2022-01-15 PROCEDURE — 87804 INFLUENZA ASSAY W/OPTIC: CPT

## 2022-01-15 PROCEDURE — G0463 HOSPITAL OUTPT CLINIC VISIT: HCPCS

## 2022-01-15 PROCEDURE — U0003 INFECTIOUS AGENT DETECTION BY NUCLEIC ACID (DNA OR RNA); SEVERE ACUTE RESPIRATORY SYNDROME CORONAVIRUS 2 (SARS-COV-2) (CORONAVIRUS DISEASE [COVID-19]), AMPLIFIED PROBE TECHNIQUE, MAKING USE OF HIGH THROUGHPUT TECHNOLOGIES AS DESCRIBED BY CMS-2020-01-R: HCPCS

## 2022-01-15 PROCEDURE — U0005 INFEC AGEN DETEC AMPLI PROBE: HCPCS

## 2022-01-15 PROCEDURE — C9803 HOPD COVID-19 SPEC COLLECT: HCPCS

## 2022-01-19 PROCEDURE — 93298 REM INTERROG DEV EVAL SCRMS: CPT | Performed by: INTERNAL MEDICINE

## 2022-01-19 PROCEDURE — G2066 INTER DEVC REMOTE 30D: HCPCS | Performed by: INTERNAL MEDICINE

## 2022-02-01 RX ORDER — NICOTINE 21 MG/24HR
PATCH, TRANSDERMAL 24 HOURS TRANSDERMAL
Qty: 30 PATCH | Refills: 6 | Status: SHIPPED | OUTPATIENT
Start: 2022-02-01 | End: 2022-08-23

## 2022-02-19 PROCEDURE — G2066 INTER DEVC REMOTE 30D: HCPCS | Performed by: INTERNAL MEDICINE

## 2022-02-19 PROCEDURE — 93298 REM INTERROG DEV EVAL SCRMS: CPT | Performed by: INTERNAL MEDICINE

## 2022-02-21 NOTE — PROGRESS NOTES
[facility] Cardiology  Follow Up Visit  Lux Mckoy  1970    VISIT DATE:  02/21/22    PCP:   Emani Alas PA  [address]  [place] [address]          CC:  No chief complaint on file.      Problem List:    1. Chest pain/shortness of breath:   a. Pulmonary evaluation.   b. Chronic pulmonary nodule, overall unchanged, followed by Dr. Mcknight and Gloria.   c. Echocardiogram 6/7/16: EF 55%, no sig valvular or structural disease  d. Stress test 6/7/2016: EF 63%, no ischemia on myocardial perfusion imaging.   e. PET Stress Test 3/2020:No evidence of ischemia, EF 63%  2. Embolic CVA  a. CT head 3/22/20: Abnormal low-attenuation region left frontoparietal posterior left MCA distribution involvement extending to involve the cortex with sulcal effacement concerning for acute or evolving infarction without intracranial hemorrhage  b. CTA head/neck 3/22/20: No large vessel occlusion including only minimal atherosclerotic involvement of the carotid bifurcations, however within the MCA distributions bilaterally, there are mild to moderate irregularities concerning for atherosclerotic involvement without distinct occlusion left greater than right of the M3 and M4 segments in particular.  c. CT perfusion with and without contrast 3/22/20: Reversible ischemia within the left posterior MCA distribution without evidence for core infarct component.  d. Echocardiogram 3/22/20: EF 60%, no sig valvular or structural disease. Negative bubble study.   e. Carotid duplex 3/22/20: no stenosis.   f. MRI brain 3/22/20: Abnormal restriction throughout the bilateral cerebral hemispheres. Multifocal appearance greatest region of fairly confluent restriction in the left temporoparietal region as seen on perfusion and prior imaging same day consistent with acute multifocal infarctions of concern for embolic etiology given bilateral and multifocal distribution. No midline shift or hydrocephalus.  g. CATY 3/24/2020: EF  60%, negative saline study, normal valvular anatomy and function.   3. Atrial tachycardia  a. 14 day monitor: No AFib, PACs <1%, Short episodes of atrial tachycardia  b. Loop recorder in place, no A. fib detected  4. Hypertension.   5. Hyperlipidemia  a. FLP 3/23/20: Trig 228, HDL 28, , LDL 83.   6. IgA nephropathy.   7. GERD.   8. Depression.   9. Migraines.   10. BPH.   11. Degenerative disease.   12. Osteoarthritis.   13. Salvador's esophagus.   14. Tobacco abuse.   15. Surgical Hx:   a. Remote back surgery x2.   b. Three right hand surgeries and 2 left hand surgery.        History of Present Illness:  Lux Mckoy  Is a 51 y.o. male with pertinent cardiac history detailed above.  Patient is endorisng intermittent chest pains today.  Can occur with exertion or at rest.  Varying intensity, sometimes sharp or squeezing.  Duration varies between minutes or seconds.  EKG today has right axis deviation which is new, ST segments unchanged c/w 2020, there is early repolarization noted in the precordial leads.   Anxiety medications had no effect on it.  His wife also notes prominent neck veins at times.  The chest pain has been a steady intermittent complaint for the last 1-1/2 years.  Today given his persistence his vascular risk factors and his abnormal EKG discussed proceeding with a heart catheterization for definitive evaluation of CAD.  Previous stress test in 2020 showed no ischemia.  His blood pressure and lipids are well controlled.  He does have IgA nephropathy and renal insufficiency needs discussed trying to minimize nephropathy risk with hydration      Patient Active Problem List    Diagnosis Date Noted   • ANI (obstructive sleep apnea) 05/28/2021   • CVA (cerebral vascular accident) ([facility])    • Anxiety    • Arthritis    • ASVD (arteriosclerotic vascular disease)    • Benign prostate hyperplasia    • Degenerative cervical disc    • Degeneration of lumbar intervertebral disc    • Dyspepsia    •  Fatigue    • Gastritis    • Hyperlipidemia    • IgA nephropathy    • Muscle spasm    • Prostate disease    • RA (rheumatoid arthritis) ([facility])    • B12 deficiency    • Vitamin D deficiency    • Confusion 07/29/2020   • Expressive aphasia 03/25/2020   • Elevated troponin 03/24/2020   • Acute ischemic stroke ([facility]) 03/22/2020   • Cerebrovascular atherosclerosis seen on imaging 03/22/2020   • Tobacco use 03/22/2020   • CKD (chronic kidney disease) stage 3, GFR 30-59 ml/min ([facility]) 03/22/2020   • Acute frontal sinusitis 08/05/2016   • Allergic rhinitis 08/01/2016   • Salvador esophagus 08/01/2016   • Chronic pain 08/01/2016   • Chronic prostatitis 08/01/2016   • Depression 08/01/2016   • Dyslipidemia 08/01/2016   • GERD (gastroesophageal reflux disease) 08/01/2016   • Hypertension 08/01/2016   • Hypogonadism in male 08/01/2016   • Neuropathy with IgA monoclonal gammopathy ([facility]) 08/01/2016   • Migraine 08/01/2016   • Neuropathy 08/01/2016   • Pulmonary nodule 08/01/2016     Note Last Updated: 8/1/2016      · A.  Ct scan of 3/25/2016 reports a 11 mm noncalcified stellate nodule in the lateral      portion of the right lower lobe.B. stable on CT scans of the abdomen and pelvis at      [facility] between 1/2015 and 3/2016.     • Vertigo 08/01/2016   • Tinea versicolor 08/01/2016       Allergies   Allergen Reactions   • Augmentin [Amoxicillin-Pot Clavulanate] Itching   • Bactrim [Sulfamethoxazole-Trimethoprim] Itching       Social History     Socioeconomic History   • Marital status:    Tobacco Use   • Smoking status: Current Every Day Smoker     Packs/day: 0.25     Types: Cigarettes   • Smokeless tobacco: Never Used   Vaping Use   • Vaping Use: Never used   Substance and Sexual Activity   • Alcohol use: Never   • Drug use: Never   • Sexual activity: Defer       Family History   Problem Relation Age of Onset   • Hypertension Other    • Other Other    • Hypertension Mother    • Heart disease Father    •  Hypertension Father    • Diabetes Father    • Arthritis Neg Hx    • Hyperlipidemia Neg Hx        Current Medications:    Current Outpatient Medications:   •  acetaminophen (TYLENOL) 325 MG tablet, Take 2 tablets by mouth Every 4 (Four) Hours As Needed for Mild Pain  or Headache., Disp: , Rfl:   •  aspirin 81 MG EC tablet, Take 1 tablet by mouth Daily., Disp: 150 tablet, Rfl: 2  •  atorvastatin (LIPITOR) 40 MG tablet, TAKE 1 TABLET BY MOUTH EVERY DAY, Disp: 30 tablet, Rfl: 11  •  azelastine (ASTEPRO) 0.15 % solution nasal spray, 1 Squirt into each nostril 2 (two) times a day., Disp: , Rfl:   •  cetirizine (ZyrTEC) 10 MG tablet, Take 1 tablet by mouth Daily As Needed for allergies., Disp: 30 tablet, Rfl: 2  •  Eliquis 5 MG tablet tablet, TAKE 1 TABLET BY MOUTH EVERY 12 HOURS, Disp: 60 tablet, Rfl: 2  •  famotidine (PEPCID) 20 MG tablet, Take 1 tablet by mouth Every Night., Disp: , Rfl:   •  folic acid (FOLVITE) 1 MG tablet, Take 1 tablet by mouth Daily., Disp: , Rfl:   •  furosemide (LASIX) 20 MG tablet, Take 1 tablet by mouth Daily As Needed (edema). Patient needs to make and keep appt for future refills. Patient needs updated lab work, Disp: 30 tablet, Rfl: 3  •  gabapentin (NEURONTIN) 400 MG capsule, Take 400 mg by mouth 2 (Two) Times a Day., Disp: , Rfl:   •  lisinopril (PRINIVIL,ZESTRIL) 2.5 MG tablet, Take 2.5 mg by mouth As Needed., Disp: , Rfl:   •  meclizine (ANTIVERT) 25 MG tablet, Take 25 mg by mouth As Needed., Disp: , Rfl:   •  nicotine (NICODERM CQ) 14 MG/24HR patch, PLACE 1 PATCH ON THE SKIN DAILY AS DIRECTED BY PROVIDER., Disp: 30 patch, Rfl: 6  •  omega-3 acid ethyl esters (LOVAZA) 1 g capsule, Take 1 capsule by mouth Daily., Disp: , Rfl:   •  oxyCODONE-acetaminophen (PERCOCET) 7.5-325 MG per tablet, Take 1 tablet by mouth 3 (Three) Times a Day., Disp: , Rfl:   •  pantoprazole (PROTONIX) 40 MG EC tablet, Take 40 mg by mouth Daily., Disp: , Rfl:   •  promethazine (PHENERGAN) 25 MG tablet, TAKE 1 TABLET  BY MOUTH EVERY 4 TO 6 HOURS AS NEEDED FOR NAUSEA, Disp: , Rfl:   •  tamsulosin (FLOMAX) 0.4 MG capsule 24 hr capsule, Take 1 capsule by mouth As Needed., Disp: , Rfl:      Review of Systems   Cardiovascular: Positive for chest pain and dyspnea on exertion. Negative for leg swelling, palpitations and syncope.   Neurological: Positive for light-headedness.       There were no vitals filed for this visit.    Physical Exam  Constitutional:       Appearance: Normal appearance.   Neck:      Vascular: No carotid bruit.   Cardiovascular:      Rate and Rhythm: Normal rate and regular rhythm.      Pulses: Normal pulses.      Heart sounds: Normal heart sounds.   Pulmonary:      Breath sounds: Normal breath sounds.   Abdominal:      Palpations: Abdomen is soft.   Musculoskeletal:      Right lower leg: No edema.      Left lower leg: No edema.   Skin:     General: Skin is warm and dry.   Neurological:      Mental Status: He is alert.         Diagnostic Data:    ECG 12 Lead    Date/Time: 2/22/2022 10:56 AM  Performed by: Joey Velazquez MD  Authorized by: Joey Velazquez MD   Comparison: compared with previous ECG from 7/29/2020  Rhythm: sinus rhythm  Rate: normal  BPM: 64  QRS axis: right  Other findings: non-specific ST-T wave changes    Clinical impression: abnormal EKG          Lab Results   Component Value Date    CHLPL 128 07/29/2020    TRIG 206 (H) 10/14/2021    HDL 27 (L) 10/14/2021     Lab Results   Component Value Date    GLUCOSE 96 07/30/2020    BUN 14 07/30/2020    CREATININE 1.65 (H) 07/30/2020     07/30/2020    K 3.9 07/30/2020     (H) 07/30/2020    CO2 25.0 07/30/2020     Lab Results   Component Value Date    HGBA1C 5.00 07/30/2020     Lab Results   Component Value Date    WBC 10.33 07/29/2020    HGB 16.1 07/29/2020    HCT 47.6 07/29/2020     07/29/2020       Assessment:  No diagnosis found.    Plan:    1.    Chest pain  a. PET stress test 3/2020 without evidence of ischemia, EF  63%  b. On ASA and statin  c. No acute ST changes on EKG  d. Due to vascular risk factors, abnormal EKG, and persistent and increasing frequency of CP will evlauate with [facility], discussed risks and benefits, will pre-hydrate        2. Hypertension  a. Within normal limits on no antihypertensive currently  b. Lasix 20mg prn edema     3. CKD III  a. Followed by nephrology as an outpatient stable  b. Will pre-hydrate ahead of heart cath     4. History of CVA  a. Admission 3/2020 concern for embolic source.  b. CATY without evidence of PFO.  c. CTA head neck negative for obstructive stenosis, there was atherosclerosis  d. Loop recorder in place without arrhythmias detected  e. On empiric Eliquis and ASA, will plan to hold Eliquis ahead of heart catheterization  f. Hypercoagulable work-up within normal limits  g. LDL 63 with triglycerides 206  h. He is on Eliquis, no further strokes, no A. fib recorded on loop interrogationd  i. Still dealing with post stroke sequela        5. Tobacco Dependence  a. Has cut down   b. Reinforced importance and cessation       Follow-up post procedure    Joey Velazquez MD [facility]

## 2022-02-22 ENCOUNTER — OFFICE VISIT (OUTPATIENT)
Dept: CARDIOLOGY | Facility: CLINIC | Age: 52
End: 2022-02-22

## 2022-02-22 VITALS
SYSTOLIC BLOOD PRESSURE: 102 MMHG | DIASTOLIC BLOOD PRESSURE: 72 MMHG | WEIGHT: 209 LBS | BODY MASS INDEX: 26.82 KG/M2 | HEART RATE: 79 BPM | OXYGEN SATURATION: 96 % | HEIGHT: 74 IN

## 2022-02-22 DIAGNOSIS — R07.9 CHEST PAIN, UNSPECIFIED TYPE: Primary | ICD-10-CM

## 2022-02-22 PROCEDURE — 99214 OFFICE O/P EST MOD 30 MIN: CPT | Performed by: INTERNAL MEDICINE

## 2022-02-22 PROCEDURE — 93000 ELECTROCARDIOGRAM COMPLETE: CPT | Performed by: INTERNAL MEDICINE

## 2022-02-24 ENCOUNTER — PREP FOR SURGERY (OUTPATIENT)
Dept: OTHER | Facility: HOSPITAL | Age: 52
End: 2022-02-24

## 2022-02-24 RX ORDER — ASPIRIN 81 MG/1
324 TABLET, CHEWABLE ORAL ONCE
Status: CANCELLED | OUTPATIENT
Start: 2022-02-24 | End: 2022-02-24

## 2022-02-24 RX ORDER — SODIUM CHLORIDE 0.9 % (FLUSH) 0.9 %
3 SYRINGE (ML) INJECTION EVERY 12 HOURS SCHEDULED
Status: CANCELLED | OUTPATIENT
Start: 2022-02-24

## 2022-02-24 RX ORDER — ASPIRIN 81 MG/1
81 TABLET ORAL DAILY
Status: CANCELLED | OUTPATIENT
Start: 2022-02-25

## 2022-02-24 RX ORDER — NITROGLYCERIN 0.4 MG/1
0.4 TABLET SUBLINGUAL
Status: CANCELLED | OUTPATIENT
Start: 2022-02-24

## 2022-02-24 RX ORDER — ACETAMINOPHEN 325 MG/1
650 TABLET ORAL EVERY 4 HOURS PRN
Status: CANCELLED | OUTPATIENT
Start: 2022-02-24

## 2022-02-24 RX ORDER — SODIUM CHLORIDE 0.9 % (FLUSH) 0.9 %
10 SYRINGE (ML) INJECTION AS NEEDED
Status: CANCELLED | OUTPATIENT
Start: 2022-02-24

## 2022-02-25 ENCOUNTER — HOSPITAL ENCOUNTER (OUTPATIENT)
Facility: HOSPITAL | Age: 52
Setting detail: HOSPITAL OUTPATIENT SURGERY
Discharge: HOME | End: 2022-02-25
Attending: INTERNAL MEDICINE | Admitting: INTERNAL MEDICINE

## 2022-02-25 VITALS
SYSTOLIC BLOOD PRESSURE: 124 MMHG | RESPIRATION RATE: 18 BRPM | DIASTOLIC BLOOD PRESSURE: 84 MMHG | HEART RATE: 67 BPM | BODY MASS INDEX: 26.46 KG/M2 | WEIGHT: 206.2 LBS | TEMPERATURE: 97.4 F | HEIGHT: 74 IN | OXYGEN SATURATION: 97 %

## 2022-02-25 DIAGNOSIS — R07.9 CHEST PAIN, UNSPECIFIED TYPE: ICD-10-CM

## 2022-02-25 LAB
ALBUMIN SERPL-MCNC: 4.3 G/DL (ref 3.5–5.2)
ALBUMIN/GLOB SERPL: 1.6 G/DL
ALP SERPL-CCNC: 108 U/L (ref 39–117)
ALT SERPL W P-5'-P-CCNC: 32 U/L (ref 1–41)
ANION GAP SERPL CALCULATED.3IONS-SCNC: 11 MMOL/L (ref 5–15)
AST SERPL-CCNC: 26 U/L (ref 1–40)
BILIRUB SERPL-MCNC: 0.6 MG/DL (ref 0–1.2)
BUN SERPL-MCNC: 17 MG/DL (ref 6–20)
BUN/CREAT SERPL: 9.7 (ref 7–25)
CALCIUM SPEC-SCNC: 9 MG/DL (ref 8.6–10.5)
CHLORIDE SERPL-SCNC: 105 MMOL/L (ref 98–107)
CHOLEST SERPL-MCNC: 113 MG/DL (ref 0–200)
CO2 SERPL-SCNC: 23 MMOL/L (ref 22–29)
CREAT BLDA-MCNC: 1.7 MG/DL (ref 0.6–1.3)
CREAT SERPL-MCNC: 1.75 MG/DL (ref 0.76–1.27)
DEPRECATED RDW RBC AUTO: 38.9 FL (ref 37–54)
ERYTHROCYTE [DISTWIDTH] IN BLOOD BY AUTOMATED COUNT: 12.5 % (ref 12.3–15.4)
GFR SERPL CREATININE-BSD FRML MDRD: 41 ML/MIN/1.73
GLOBULIN UR ELPH-MCNC: 2.7 GM/DL
GLUCOSE SERPL-MCNC: 97 MG/DL (ref 65–99)
HBA1C MFR BLD: 5.4 % (ref 4.8–5.6)
HCT VFR BLD AUTO: 46.8 % (ref 37.5–51)
HDLC SERPL-MCNC: 30 MG/DL (ref 40–60)
HGB BLD-MCNC: 16.4 G/DL (ref 13–17.7)
LDLC SERPL CALC-MCNC: 50 MG/DL (ref 0–100)
LDLC/HDLC SERPL: 1.45 {RATIO}
MCH RBC QN AUTO: 29.9 PG (ref 26.6–33)
MCHC RBC AUTO-ENTMCNC: 35 G/DL (ref 31.5–35.7)
MCV RBC AUTO: 85.4 FL (ref 79–97)
PLATELET # BLD AUTO: 262 10*3/MM3 (ref 140–450)
PMV BLD AUTO: 10.6 FL (ref 6–12)
POTASSIUM SERPL-SCNC: 4.2 MMOL/L (ref 3.5–5.2)
PROT SERPL-MCNC: 7 G/DL (ref 6–8.5)
RBC # BLD AUTO: 5.48 10*6/MM3 (ref 4.14–5.8)
SODIUM SERPL-SCNC: 139 MMOL/L (ref 136–145)
TRIGL SERPL-MCNC: 198 MG/DL (ref 0–150)
VLDLC SERPL-MCNC: 33 MG/DL (ref 5–40)
WBC NRBC COR # BLD: 9.88 10*3/MM3 (ref 3.4–10.8)

## 2022-02-25 PROCEDURE — 93458 L HRT ARTERY/VENTRICLE ANGIO: CPT | Performed by: INTERNAL MEDICINE

## 2022-02-25 PROCEDURE — 0 LIDOCAINE 1 % SOLUTION: Performed by: INTERNAL MEDICINE

## 2022-02-25 PROCEDURE — 80053 COMPREHEN METABOLIC PANEL: CPT | Performed by: PHYSICIAN ASSISTANT

## 2022-02-25 PROCEDURE — 25010000002 HEPARIN (PORCINE) PER 1000 UNITS: Performed by: INTERNAL MEDICINE

## 2022-02-25 PROCEDURE — C1894 INTRO/SHEATH, NON-LASER: HCPCS | Performed by: INTERNAL MEDICINE

## 2022-02-25 PROCEDURE — 0 IOPAMIDOL PER 1 ML: Performed by: INTERNAL MEDICINE

## 2022-02-25 PROCEDURE — 25010000002 FENTANYL CITRATE (PF) 50 MCG/ML SOLUTION: Performed by: INTERNAL MEDICINE

## 2022-02-25 PROCEDURE — C1769 GUIDE WIRE: HCPCS | Performed by: INTERNAL MEDICINE

## 2022-02-25 PROCEDURE — 80061 LIPID PANEL: CPT | Performed by: PHYSICIAN ASSISTANT

## 2022-02-25 PROCEDURE — 85027 COMPLETE CBC AUTOMATED: CPT | Performed by: PHYSICIAN ASSISTANT

## 2022-02-25 PROCEDURE — 82565 ASSAY OF CREATININE: CPT

## 2022-02-25 PROCEDURE — 83036 HEMOGLOBIN GLYCOSYLATED A1C: CPT | Performed by: PHYSICIAN ASSISTANT

## 2022-02-25 PROCEDURE — 25010000002 MIDAZOLAM PER 1 MG: Performed by: INTERNAL MEDICINE

## 2022-02-25 RX ORDER — MIDAZOLAM HYDROCHLORIDE 1 MG/ML
INJECTION INTRAMUSCULAR; INTRAVENOUS AS NEEDED
Status: DISCONTINUED | OUTPATIENT
Start: 2022-02-25 | End: 2022-02-25 | Stop reason: HOSPADM

## 2022-02-25 RX ORDER — ASPIRIN 81 MG/1
324 TABLET, CHEWABLE ORAL ONCE
Status: COMPLETED | OUTPATIENT
Start: 2022-02-25 | End: 2022-02-25

## 2022-02-25 RX ORDER — SODIUM CHLORIDE 0.9 % (FLUSH) 0.9 %
3 SYRINGE (ML) INJECTION EVERY 12 HOURS SCHEDULED
Status: DISCONTINUED | OUTPATIENT
Start: 2022-02-25 | End: 2022-02-25 | Stop reason: HOSPADM

## 2022-02-25 RX ORDER — LIDOCAINE HYDROCHLORIDE 10 MG/ML
INJECTION, SOLUTION INFILTRATION; PERINEURAL AS NEEDED
Status: DISCONTINUED | OUTPATIENT
Start: 2022-02-25 | End: 2022-02-25 | Stop reason: HOSPADM

## 2022-02-25 RX ORDER — NITROGLYCERIN 0.4 MG/1
0.4 TABLET SUBLINGUAL
Status: DISCONTINUED | OUTPATIENT
Start: 2022-02-25 | End: 2022-02-25 | Stop reason: HOSPADM

## 2022-02-25 RX ORDER — FENTANYL CITRATE 50 UG/ML
INJECTION, SOLUTION INTRAMUSCULAR; INTRAVENOUS AS NEEDED
Status: DISCONTINUED | OUTPATIENT
Start: 2022-02-25 | End: 2022-02-25 | Stop reason: HOSPADM

## 2022-02-25 RX ORDER — OXYCODONE AND ACETAMINOPHEN 10; 325 MG/1; MG/1
1 TABLET ORAL 3 TIMES DAILY
COMMUNITY

## 2022-02-25 RX ORDER — SODIUM CHLORIDE 9 MG/ML
3 INJECTION, SOLUTION INTRAVENOUS CONTINUOUS
Status: ACTIVE | OUTPATIENT
Start: 2022-02-25 | End: 2022-02-25

## 2022-02-25 RX ORDER — SODIUM CHLORIDE 0.9 % (FLUSH) 0.9 %
10 SYRINGE (ML) INJECTION AS NEEDED
Status: DISCONTINUED | OUTPATIENT
Start: 2022-02-25 | End: 2022-02-25 | Stop reason: HOSPADM

## 2022-02-25 RX ORDER — ACETAMINOPHEN 325 MG/1
650 TABLET ORAL EVERY 4 HOURS PRN
Status: DISCONTINUED | OUTPATIENT
Start: 2022-02-25 | End: 2022-02-25 | Stop reason: HOSPADM

## 2022-02-25 RX ORDER — ASPIRIN 81 MG/1
81 TABLET ORAL DAILY
Status: DISCONTINUED | OUTPATIENT
Start: 2022-02-26 | End: 2022-02-25 | Stop reason: HOSPADM

## 2022-02-25 RX ORDER — SODIUM CHLORIDE 9 MG/ML
1 INJECTION, SOLUTION INTRAVENOUS CONTINUOUS
Status: DISCONTINUED | OUTPATIENT
Start: 2022-02-25 | End: 2022-02-25 | Stop reason: HOSPADM

## 2022-02-25 RX ADMIN — SODIUM CHLORIDE 3 ML/KG/HR: 9 INJECTION, SOLUTION INTRAVENOUS at 07:12

## 2022-02-25 RX ADMIN — ASPIRIN 81 MG 324 MG: 81 TABLET ORAL at 07:12

## 2022-03-07 DIAGNOSIS — I69.30 SEQUELAE, POST-STROKE: ICD-10-CM

## 2022-03-07 RX ORDER — APIXABAN 5 MG/1
TABLET, FILM COATED ORAL
Qty: 60 TABLET | Refills: 2 | Status: SHIPPED | OUTPATIENT
Start: 2022-03-07 | End: 2022-06-08

## 2022-03-22 PROCEDURE — 93298 REM INTERROG DEV EVAL SCRMS: CPT | Performed by: INTERNAL MEDICINE

## 2022-03-22 PROCEDURE — G2066 INTER DEVC REMOTE 30D: HCPCS | Performed by: INTERNAL MEDICINE

## 2022-03-24 RX ORDER — FUROSEMIDE 20 MG/1
20 TABLET ORAL DAILY PRN
Qty: 30 TABLET | Refills: 5 | Status: SHIPPED | OUTPATIENT
Start: 2022-03-24 | End: 2023-02-06 | Stop reason: SDUPTHER

## 2022-04-18 ENCOUNTER — TELEPHONE (OUTPATIENT)
Dept: NEUROLOGY | Facility: CLINIC | Age: 52
End: 2022-04-18

## 2022-04-18 NOTE — TELEPHONE ENCOUNTER
Spoke with patient and he has not had any episodes of A-FIB so I informed him of the Eliquis and also LVM for Mariam.

## 2022-04-18 NOTE — TELEPHONE ENCOUNTER
[facility] [place] IS CALLING NEEDING TO KNOW IF THE PATIENT CAN STOP  ASPIRIN 7 DAYS PRIOR TO PROCEDURE AND ELIQUIS 3 DAYS PRIOR TO PROCEDURE.    PLEASE RETURN CALL TO:    [phone]  EXT [id number]  OLUIE

## 2022-04-18 NOTE — TELEPHONE ENCOUNTER
Yes he can.  Can you asked the patient if he has had any episodes of atrial fibrillation till date captured on the loop recorder?  If he has then he will have to be bridged with Lovenox shots for the time he is off Eliquis but if he has not had no episodes then he can just stop Eliquis and resume it the day after the procedure

## 2022-04-19 NOTE — TELEPHONE ENCOUNTER
Shannon called back asking if the Asprin could be held as well. I did not see anything in Dr. Carmona's message on the Asprin and told her I would ask and call back.

## 2022-04-26 ENCOUNTER — TELEPHONE (OUTPATIENT)
Dept: CARDIOLOGY | Facility: CLINIC | Age: 52
End: 2022-04-26

## 2022-04-26 NOTE — TELEPHONE ENCOUNTER
Surgical/Cardiac Clearance needed:    Procedure: L4-L5 Anterior Lumbar Interbody Fusion and Posterior spinal decompression fusion      Provider: Dr. Pack    Date: Not scheduled    Pertinent Medications: Eliquis 5 mg: 3 days   Aspirin 81 m days Both okay'd to hold by Dr. Carmona        Contact Info: Mariam  -Phone: [phone] ext [phone]  -Fax: [phone]    Please Advise

## 2022-05-23 PROCEDURE — G2066 INTER DEVC REMOTE 30D: HCPCS | Performed by: INTERNAL MEDICINE

## 2022-05-23 PROCEDURE — 93298 REM INTERROG DEV EVAL SCRMS: CPT | Performed by: INTERNAL MEDICINE

## 2022-06-07 DIAGNOSIS — I69.30 SEQUELAE, POST-STROKE: ICD-10-CM

## 2022-06-08 RX ORDER — APIXABAN 5 MG/1
TABLET, FILM COATED ORAL
Qty: 60 TABLET | Refills: 2 | Status: SHIPPED | OUTPATIENT
Start: 2022-06-08 | End: 2022-08-09

## 2022-08-09 DIAGNOSIS — I69.30 SEQUELAE, POST-STROKE: ICD-10-CM

## 2022-08-09 RX ORDER — APIXABAN 5 MG/1
TABLET, FILM COATED ORAL
Qty: 60 TABLET | Refills: 2 | Status: SHIPPED | OUTPATIENT
Start: 2022-08-09 | End: 2022-12-07

## 2022-08-23 ENCOUNTER — OFFICE VISIT (OUTPATIENT)
Dept: CARDIOLOGY | Facility: CLINIC | Age: 52
End: 2022-08-23

## 2022-08-23 VITALS
HEIGHT: 74 IN | DIASTOLIC BLOOD PRESSURE: 70 MMHG | OXYGEN SATURATION: 98 % | BODY MASS INDEX: 26.31 KG/M2 | SYSTOLIC BLOOD PRESSURE: 130 MMHG | HEART RATE: 83 BPM | WEIGHT: 205 LBS

## 2022-08-23 DIAGNOSIS — I63.9 ACUTE ISCHEMIC STROKE: ICD-10-CM

## 2022-08-23 PROBLEM — M54.10 RADICULAR PAIN: Status: ACTIVE | Noted: 2018-12-17

## 2022-08-23 PROBLEM — M72.0 DUPUYTREN'S DISEASE: Status: ACTIVE | Noted: 2022-08-23

## 2022-08-23 PROBLEM — Z98.1 ARTHRODESIS STATUS: Status: ACTIVE | Noted: 2018-08-30

## 2022-08-23 PROBLEM — R06.02 SHORTNESS OF BREATH: Status: ACTIVE | Noted: 2022-08-23

## 2022-08-23 PROBLEM — Z01.818 PREOPERATIVE EXAMINATION, UNSPECIFIED: Status: ACTIVE | Noted: 2018-07-26

## 2022-08-23 PROBLEM — Z22.322 SPECIMEN POSITIVE FOR METHICILLIN RESISTANT STAPHYLOCOCCUS AUREUS (MRSA): Status: ACTIVE | Noted: 2018-07-13

## 2022-08-23 PROBLEM — R31.29 MICROSCOPIC HEMATURIA: Status: ACTIVE | Noted: 2019-07-23

## 2022-08-23 PROBLEM — R11.0 NAUSEA: Status: ACTIVE | Noted: 2019-01-07

## 2022-08-23 PROBLEM — R74.8 ABNORMAL LEVELS OF OTHER SERUM ENZYMES: Status: ACTIVE | Noted: 2017-06-23

## 2022-08-23 PROBLEM — IMO0002 PSEUDOARTHROSIS OF SPINE: Status: ACTIVE | Noted: 2017-09-11

## 2022-08-23 PROCEDURE — 93291 INTERROG DEV EVAL SCRMS IP: CPT | Performed by: INTERNAL MEDICINE

## 2022-08-23 PROCEDURE — 99214 OFFICE O/P EST MOD 30 MIN: CPT | Performed by: INTERNAL MEDICINE

## 2022-08-23 RX ORDER — NICOTINE 21 MG/24HR
1 PATCH, TRANSDERMAL 24 HOURS TRANSDERMAL EVERY 24 HOURS
Qty: 28 EACH | Refills: 3 | Status: SHIPPED | OUTPATIENT
Start: 2022-08-23

## 2022-08-23 RX ORDER — FAMOTIDINE 20 MG/1
10 TABLET, FILM COATED ORAL 2 TIMES DAILY
COMMUNITY

## 2022-08-23 RX ORDER — RIZATRIPTAN BENZOATE 10 MG/1
10 TABLET ORAL AS NEEDED
COMMUNITY

## 2022-08-23 RX ORDER — ASPIRIN 81 MG/1
81 TABLET ORAL DAILY
COMMUNITY

## 2022-08-23 NOTE — PROGRESS NOTES
[facility] Cardiology  Follow Up Visit  Lux Mckoy  1970    VISIT DATE:  08/23/22    PCP:   Emani Alas PA  [address]  [place] [address]          CC:  Cerebrovascular atherosclerosis      Problem List  1. Chest pain/shortness of breath:   a. Pulmonary evaluation.   b. Chronic pulmonary nodule, overall unchanged, followed by Dr. Mcknight and Gloria.   c. Echocardiogram 6/7/16: EF 55%, no sig valvular or structural disease  d. Stress test 6/7/2016: EF 63%, no ischemia on myocardial perfusion imaging.   e. PET Stress Test 3/2020:No evidence of ischemia, EF 63%  f. Cardiac cath February 2022: Normal coronary arteries, LVEDP 11  2. Embolic CVA  a. CT head 3/22/20: Abnormal low-attenuation region left frontoparietal posterior left MCA distribution involvement extending to involve the cortex with sulcal effacement concerning for acute or evolving infarction without intracranial hemorrhage  b. CTA head/neck 3/22/20: No large vessel occlusion including only minimal atherosclerotic involvement of the carotid bifurcations, however within the MCA distributions bilaterally, there are mild to moderate irregularities concerning for atherosclerotic involvement without distinct occlusion left greater than right of the M3 and M4 segments in particular.  c. CT perfusion with and without contrast 3/22/20: Reversible ischemia within the left posterior MCA distribution without evidence for core infarct component.  d. Echocardiogram 3/22/20: EF 60%, no sig valvular or structural disease. Negative bubble study.   e. Carotid duplex 3/22/20: no stenosis.   f. MRI brain 3/22/20: Abnormal restriction throughout the bilateral cerebral hemispheres. Multifocal appearance greatest region of fairly confluent restriction in the left temporoparietal region as seen on perfusion and prior imaging same day consistent with acute multifocal infarctions of concern for embolic etiology given bilateral and multifocal  distribution. No midline shift or hydrocephalus.  g. CATY 3/24/2020: EF 60%, negative saline study, normal valvular anatomy and function.   3. Atrial tachycardia  a. 14 day monitor: No AFib, PACs <1%, Short episodes of atrial tachycardia  b. Loop recorder in place, no A. fib detected  4. Hypertension.   5. Hyperlipidemia  6. IgA nephropathy.   7. GERD.   8. Depression.   9. Migraines.   10. BPH.   11. Degenerative disease.   12. Osteoarthritis.   13. Salvador's esophagus.   14. Tobacco abuse.   15. Surgical Hx:   a. Remote back surgery x2.   b. Three right hand surgeries and 2 left hand surgery.        History of Present Illness:  Lux Mckoy  Is a 52 y.o. male with pertinent cardiac history detailed above.  Since last visit the patient underwent a heart catheterization for his chest pain.  This showed no CAD.  Loop recorder checks have not shown any A. fib.  Chest pain is not currently an active issue.  He does endorse back pain which is relatively chronic in nature.  He is still smoking about half pack per day.  He states nicotine patches helped but he has been unable to quit.      Patient Active Problem List    Diagnosis Date Noted   • Dupuytren's disease 08/23/2022   • Shortness of breath 08/23/2022   • Chest pain 02/22/2022     Note Last Updated: 2/25/2022     [facility] 2/25/22: Normal coronary arteries, LVEDP 11 mmHg.     • ANI (obstructive sleep apnea) 05/28/2021   • CVA (cerebral vascular accident) ([facility])    • Anxiety    • Arthritis    • ASVD (arteriosclerotic vascular disease)    • Benign prostate hyperplasia    • Degenerative cervical disc    • Degeneration of lumbar intervertebral disc    • Dyspepsia    • Fatigue    • Gastritis    • Hyperlipidemia    • IgA nephropathy    • Muscle spasm    • Prostate disease    • RA (rheumatoid arthritis) ([facility])    • B12 deficiency    • Vitamin D deficiency    • Confusion 07/29/2020   • Expressive aphasia 03/25/2020   • Elevated troponin 03/24/2020   • Acute ischemic  stroke ([facility]) 03/22/2020   • Cerebrovascular atherosclerosis seen on imaging 03/22/2020   • Tobacco use 03/22/2020   • CKD (chronic kidney disease) stage 3, GFR 30-59 ml/min ([facility]) 03/22/2020   • Microscopic hematuria 07/23/2019   • Nausea 01/07/2019   • Radicular pain 12/17/2018   • Arthrodesis status 08/30/2018   • Preoperative examination, unspecified 07/26/2018   • Specimen positive for methicillin resistant Staphylococcus aureus (MRSA) 07/13/2018   • Pseudoarthrosis of spine 09/11/2017   • Abnormal levels of other serum enzymes 06/23/2017   • Neck pain 12/23/2016   • Spinal stenosis of cervical region 09/14/2016   • Acute frontal sinusitis 08/05/2016   • Allergic rhinitis 08/01/2016   • Salvador esophagus 08/01/2016   • Chronic pain 08/01/2016   • Chronic prostatitis 08/01/2016   • Depression 08/01/2016   • Dyslipidemia 08/01/2016   • GERD (gastroesophageal reflux disease) 08/01/2016   • Hypertension 08/01/2016   • Hypogonadism in male 08/01/2016   • Neuropathy with IgA monoclonal gammopathy ([facility]) 08/01/2016   • Migraine 08/01/2016   • Neuropathy 08/01/2016   • Pulmonary nodule 08/01/2016     Note Last Updated: 8/1/2016      · A.  Ct scan of 3/25/2016 reports a 11 mm noncalcified stellate nodule in the lateral      portion of the right lower lobe.B. stable on CT scans of the abdomen and pelvis at      [facility] between 1/2015 and 3/2016.     • Vertigo 08/01/2016   • Tinea versicolor 08/01/2016       Allergies   Allergen Reactions   • Augmentin [Amoxicillin-Pot Clavulanate] Itching   • Bactrim [Sulfamethoxazole-Trimethoprim] Itching       Social History     Socioeconomic History   • Marital status:    Tobacco Use   • Smoking status: Current Every Day Smoker     Packs/day: 0.25     Types: Cigarettes   • Smokeless tobacco: Never Used   Vaping Use   • Vaping Use: Never used   Substance and Sexual Activity   • Alcohol use: Never   • Drug use: Never   • Sexual activity: Defer       Family History    Problem Relation Age of Onset   • Hypertension Other    • Other Other    • Hypertension Mother    • Heart disease Father    • Hypertension Father    • Diabetes Father    • Arthritis Neg Hx    • Hyperlipidemia Neg Hx        Current Medications:    Current Outpatient Medications:   •  acetaminophen (TYLENOL) 325 MG tablet, Take 2 tablets by mouth Every 4 (Four) Hours As Needed for Mild Pain  or Headache. (Patient taking differently: Take 650 mg by mouth Daily As Needed for Mild Pain  or Headache.), Disp: , Rfl:   •  aspirin 81 MG EC tablet, Take 81 mg by mouth Daily., Disp: , Rfl:   •  atorvastatin (LIPITOR) 40 MG tablet, TAKE 1 TABLET BY MOUTH EVERY DAY (Patient taking differently: Every Night.), Disp: 30 tablet, Rfl: 11  •  cetirizine (ZyrTEC) 10 MG tablet, Take 1 tablet by mouth Daily As Needed for allergies. (Patient taking differently: Take 10 mg by mouth Daily.), Disp: 30 tablet, Rfl: 2  •  Cholecalciferol (Vitamin D3) 25 MCG (1000 UT) capsule, Take 1,000 Units by mouth Daily., Disp: , Rfl:   •  Eliquis 5 MG tablet tablet, TAKE 1 TABLET BY MOUTH EVERY 12 HOURS, Disp: 60 tablet, Rfl: 2  •  famotidine (PEPCID) 20 MG tablet, Take 10 mg by mouth 2 (Two) Times a Day., Disp: , Rfl:   •  folic acid (FOLVITE) 1 MG tablet, Take 1 tablet by mouth Daily., Disp: , Rfl:   •  furosemide (LASIX) 20 MG tablet, Take 1 tablet by mouth Daily As Needed (edema)., Disp: 30 tablet, Rfl: 5  •  gabapentin (NEURONTIN) 400 MG capsule, Take 300 mg by mouth 3 (Three) Times a Day., Disp: , Rfl:   •  meclizine (ANTIVERT) 25 MG tablet, Take 25 mg by mouth As Needed., Disp: , Rfl:   •  omega-3 acid ethyl esters (LOVAZA) 1 g capsule, Take 1 capsule by mouth Daily., Disp: , Rfl:   •  oxyCODONE-acetaminophen (PERCOCET)  MG per tablet, Take 1 tablet by mouth 3 (Three) Times a Day., Disp: , Rfl:   •  pantoprazole (PROTONIX) 40 MG EC tablet, Take 40 mg by mouth Daily., Disp: , Rfl:   •  promethazine (PHENERGAN) 25 MG tablet, TAKE 1 TABLET BY  "MOUTH EVERY 4 TO 6 HOURS AS NEEDED FOR NAUSEA, Disp: , Rfl:   •  rizatriptan (MAXALT) 10 MG tablet, Take 10 mg by mouth As Needed for Migraine. May repeat in 2 hours if needed, Disp: , Rfl:   •  tamsulosin (FLOMAX) 0.4 MG capsule 24 hr capsule, Take 1 capsule by mouth As Needed., Disp: , Rfl:   •  nicotine (Nicoderm CQ) 21 MG/24HR patch, Place 1 patch on the skin as directed by provider Daily., Disp: 28 each, Rfl: 3     Review of Systems   Cardiovascular: Negative for chest pain, dyspnea on exertion, irregular heartbeat, leg swelling and palpitations.   Musculoskeletal: Positive for back pain.       Vitals:    08/23/22 1523   BP: 130/70   BP Location: Left arm   Patient Position: Sitting   Pulse: 83   SpO2: 98%   Weight: 93 kg (205 lb)   Height: 188 cm (74\")       Physical Exam  Constitutional:       Appearance: Normal appearance.   Cardiovascular:      Rate and Rhythm: Normal rate and regular rhythm.      Pulses: Normal pulses.      Heart sounds: Normal heart sounds.   Pulmonary:      Effort: Pulmonary effort is normal.      Breath sounds: Normal breath sounds.   Musculoskeletal:      Right lower leg: No edema.      Left lower leg: No edema.   Neurological:      General: No focal deficit present.      Mental Status: He is alert.         Diagnostic Data:  Procedures  Lab Results   Component Value Date    CHLPL 128 07/29/2020    TRIG 198 (H) 02/25/2022    HDL 30 (L) 02/25/2022     Lab Results   Component Value Date    GLUCOSE 97 02/25/2022    BUN 17 02/25/2022    CREATININE 1.70 (H) 02/25/2022     02/25/2022    K 4.1 06/16/2022     02/25/2022    CO2 23.0 02/25/2022     Lab Results   Component Value Date    HGBA1C 5.40 02/25/2022     Lab Results   Component Value Date    WBC 17.16 (H) 06/18/2022    HGB 12.4 (L) 06/18/2022    HCT 37.4 (L) 06/18/2022     06/18/2022       Assessment:   Diagnosis Plan   1. Acute ischemic stroke (HCC)         Plan:      1.  Chest pain  -Appears noncardiac, cardiac " catheterization with normal coronary  -Currently not an active complaint    2..  Hypertension  -Within normal limits today    3.  CKD  -Has followed with nephrology    4.  History of CVA  -Admitted 3/2020 with concern for embolic source  -CATY with no PFO  -CTA head neck with atherosclerosis but no obstructive stenosis  -Loop recorder in place with no evidence of A. Fib  -He does continue on empiric Eliquis without recurrent stroke  -Last LDL 51    5.  Tobacco use  -1/2 ppd smoker.  Prescribed him high-dose nicotine patches and effort to cut down or quit        Joey Velazquez MD [facility]

## 2022-10-14 PROCEDURE — 93298 REM INTERROG DEV EVAL SCRMS: CPT | Performed by: INTERNAL MEDICINE

## 2022-10-14 PROCEDURE — G2066 INTER DEVC REMOTE 30D: HCPCS | Performed by: INTERNAL MEDICINE

## 2022-11-23 ENCOUNTER — HOSPITAL ENCOUNTER (EMERGENCY)
Age: 52
Discharge: HOME | End: 2022-11-23
Payer: MEDICAID

## 2022-11-23 VITALS
DIASTOLIC BLOOD PRESSURE: 71 MMHG | SYSTOLIC BLOOD PRESSURE: 117 MMHG | RESPIRATION RATE: 20 BRPM | TEMPERATURE: 98 F | OXYGEN SATURATION: 98 % | HEART RATE: 82 BPM

## 2022-11-23 VITALS — DIASTOLIC BLOOD PRESSURE: 71 MMHG | OXYGEN SATURATION: 93 % | HEART RATE: 82 BPM | SYSTOLIC BLOOD PRESSURE: 111 MMHG

## 2022-11-23 VITALS — SYSTOLIC BLOOD PRESSURE: 115 MMHG | DIASTOLIC BLOOD PRESSURE: 77 MMHG | HEART RATE: 80 BPM | OXYGEN SATURATION: 93 %

## 2022-11-23 VITALS
DIASTOLIC BLOOD PRESSURE: 80 MMHG | RESPIRATION RATE: 18 BRPM | TEMPERATURE: 97.88 F | BODY MASS INDEX: 27.6 KG/M2 | HEART RATE: 89 BPM | OXYGEN SATURATION: 97 % | SYSTOLIC BLOOD PRESSURE: 105 MMHG

## 2022-11-23 DIAGNOSIS — U07.1: Primary | ICD-10-CM

## 2022-11-23 DIAGNOSIS — J10.1: ICD-10-CM

## 2022-11-23 PROCEDURE — C9803 HOPD COVID-19 SPEC COLLECT: HCPCS

## 2022-11-23 PROCEDURE — U0003 INFECTIOUS AGENT DETECTION BY NUCLEIC ACID (DNA OR RNA); SEVERE ACUTE RESPIRATORY SYNDROME CORONAVIRUS 2 (SARS-COV-2) (CORONAVIRUS DISEASE [COVID-19]), AMPLIFIED PROBE TECHNIQUE, MAKING USE OF HIGH THROUGHPUT TECHNOLOGIES AS DESCRIBED BY CMS-2020-01-R: HCPCS

## 2022-11-23 PROCEDURE — U0005 INFEC AGEN DETEC AMPLI PROBE: HCPCS

## 2022-11-23 PROCEDURE — 99283 EMERGENCY DEPT VISIT LOW MDM: CPT

## 2022-12-07 DIAGNOSIS — I69.30 SEQUELAE, POST-STROKE: ICD-10-CM

## 2022-12-07 RX ORDER — APIXABAN 5 MG/1
TABLET, FILM COATED ORAL
Qty: 60 TABLET | Refills: 2 | Status: SHIPPED | OUTPATIENT
Start: 2022-12-07

## 2022-12-07 NOTE — TELEPHONE ENCOUNTER
Rx Refill Note  Requested Prescriptions     Pending Prescriptions Disp Refills   • Eliquis 5 MG tablet tablet [Pharmacy Med Name: ELIQUIS 5MG TABLETS] 60 tablet 2     Sig: TAKE 1 TABLET BY MOUTH EVERY 12 HOURS      Last filled:08/09/2022  Last office visit with prescribing clinician: 10/14/2021      Next office visit with prescribing clinician: Visit date not found     Annamarie Catalan MA  12/07/22, 08:52 EST

## 2023-01-09 RX ORDER — ASPIRIN 81 MG/1
TABLET, COATED ORAL
Qty: 150 TABLET | OUTPATIENT
Start: 2023-01-09

## 2023-01-13 RX ORDER — ASPIRIN 81 MG/1
TABLET, COATED ORAL
Qty: 150 TABLET | OUTPATIENT
Start: 2023-01-13

## 2023-01-15 PROCEDURE — G2066 INTER DEVC REMOTE 30D: HCPCS | Performed by: INTERNAL MEDICINE

## 2023-01-15 PROCEDURE — 93298 REM INTERROG DEV EVAL SCRMS: CPT | Performed by: INTERNAL MEDICINE

## 2023-02-06 RX ORDER — FUROSEMIDE 20 MG/1
20 TABLET ORAL DAILY PRN
Qty: 90 TABLET | Refills: 1 | Status: SHIPPED | OUTPATIENT
Start: 2023-02-06

## 2023-03-18 PROCEDURE — 93298 REM INTERROG DEV EVAL SCRMS: CPT | Performed by: INTERNAL MEDICINE

## 2023-03-18 PROCEDURE — G2066 INTER DEVC REMOTE 30D: HCPCS | Performed by: INTERNAL MEDICINE

## 2023-04-09 DIAGNOSIS — I69.30 SEQUELAE, POST-STROKE: ICD-10-CM

## 2023-04-11 NOTE — TELEPHONE ENCOUNTER
Rx Refill Note  Requested Prescriptions     Pending Prescriptions Disp Refills   • Eliquis 5 MG tablet tablet [Pharmacy Med Name: ELIQUIS 5MG TABLETS] 60 tablet 2     Sig: TAKE 1 TABLET BY MOUTH EVERY 12 HOURS      Last filled: 12/7/22 with 2 refills sent in 30 day supply with 0 refills and note on script patient must schedule a follow up appt!  Last office visit with prescribing clinician: 10/14/2021      Next office visit with prescribing clinician: Visit date not found     Odalys Vitale MA  04/11/23, 08:44 EDT

## 2023-04-18 PROCEDURE — 93298 REM INTERROG DEV EVAL SCRMS: CPT | Performed by: INTERNAL MEDICINE

## 2023-04-18 PROCEDURE — G2066 INTER DEVC REMOTE 30D: HCPCS | Performed by: INTERNAL MEDICINE

## 2023-04-20 DIAGNOSIS — I69.30 SEQUELAE, POST-STROKE: ICD-10-CM

## 2023-04-20 RX ORDER — APIXABAN 5 MG/1
TABLET, FILM COATED ORAL
Qty: 60 TABLET | Refills: 3 | Status: SHIPPED | OUTPATIENT
Start: 2023-04-20

## 2023-04-20 NOTE — TELEPHONE ENCOUNTER
Rx Refill Note  Requested Prescriptions     Pending Prescriptions Disp Refills   • Eliquis 5 MG tablet tablet [Pharmacy Med Name: ELIQUIS 5MG TABLETS] 60 tablet 0     Sig: TAKE 1 TABLET BY MOUTH EVERY 12 HOURS. CONTACT NEUROLOGY OFFICE TO FOLLOW UP FOR FURTHER REFILLS      Last filled:04/11/23  Last office visit with prescribing clinician: 10/14/2021      Next office visit with prescribing clinician: 7/13/2023     Fina Limon MA  04/20/23, 08:52 EDT

## 2023-05-24 ENCOUNTER — TELEPHONE (OUTPATIENT)
Dept: NEUROLOGY | Facility: CLINIC | Age: 53
End: 2023-05-24

## 2023-05-24 NOTE — TELEPHONE ENCOUNTER
Provider: ALDA GUTIÉRREZ MD    Caller: HAILEY    Relationship to Patient: SELF    Phone Number: [phone]    Reason for Call:  WANTING TO KNOW IF PT NEEDS TO STOP HIS BLOOD THINNERS BEFORE HAVING A COUPLE OF TEETH PULLED.   STATED PT IS NOT SCHEDULED YET.   WANTING TO KNOW WHEN TO STOP THE MEDICATION IF SO.  PT IS ON ELIQUIS & ASPRIN.     PLEASE CALL & ADVISE

## 2023-05-25 NOTE — TELEPHONE ENCOUNTER
Spoke to patient and spouse.  They state that the Dentist would not do the extraction last time until he was off of the blood thinner for 7 days.  They are going to see if the Dentist needs a clearance letter from Dr Carmona and call back if they do.

## 2023-07-20 PROCEDURE — 93298 REM INTERROG DEV EVAL SCRMS: CPT | Performed by: INTERNAL MEDICINE

## 2023-07-20 PROCEDURE — G2066 INTER DEVC REMOTE 30D: HCPCS | Performed by: INTERNAL MEDICINE

## 2023-08-20 PROCEDURE — 93298 REM INTERROG DEV EVAL SCRMS: CPT | Performed by: INTERNAL MEDICINE

## 2023-08-20 PROCEDURE — G2066 INTER DEVC REMOTE 30D: HCPCS | Performed by: INTERNAL MEDICINE

## 2023-09-28 DIAGNOSIS — I69.30 SEQUELAE, POST-STROKE: ICD-10-CM

## 2023-09-29 RX ORDER — APIXABAN 5 MG/1
TABLET, FILM COATED ORAL
Qty: 60 TABLET | Refills: 3 | Status: SHIPPED | OUTPATIENT
Start: 2023-09-29

## 2023-09-29 NOTE — TELEPHONE ENCOUNTER
Rx Refill Note  Requested Prescriptions     Pending Prescriptions Disp Refills    Eliquis 5 MG tablet tablet [Pharmacy Med Name: ELIQUIS 5MG TABLETS] 60 tablet 3     Sig: TAKE 1 TABLET BY MOUTH EVERY 12 HOURS. CONTACT NEUROLOGY OFFICE TO. FOLLOW UP FOR FURTHER REFILLS      Last filled:04/20/2023  Last office visit with prescribing clinician: 7/13/2023      Next office visit with prescribing clinician: 3/13/2024     Fina Limon MA  09/29/23, 14:41 EDT    I sent the Rx to the pharmacy.

## 2023-11-09 ENCOUNTER — TELEPHONE (OUTPATIENT)
Dept: CARDIOLOGY | Facility: CLINIC | Age: 53
End: 2023-11-09
Payer: COMMERCIAL

## 2023-11-09 NOTE — TELEPHONE ENCOUNTER
Called Ean to have him send in a reading. His reader for th [employer] monitor will not charge. I gave him the number for tech support at [employer] and as him to give them a call. He stated he would call.

## 2023-11-28 ENCOUNTER — OFFICE VISIT (OUTPATIENT)
Dept: CARDIOLOGY | Facility: CLINIC | Age: 53
End: 2023-11-28
Payer: COMMERCIAL

## 2023-11-28 VITALS
HEART RATE: 75 BPM | HEIGHT: 74 IN | DIASTOLIC BLOOD PRESSURE: 84 MMHG | OXYGEN SATURATION: 96 % | BODY MASS INDEX: 26.54 KG/M2 | SYSTOLIC BLOOD PRESSURE: 124 MMHG | WEIGHT: 206.8 LBS

## 2023-11-28 DIAGNOSIS — R42 DIZZINESS: Primary | ICD-10-CM

## 2023-11-28 PROCEDURE — 99214 OFFICE O/P EST MOD 30 MIN: CPT | Performed by: INTERNAL MEDICINE

## 2023-11-28 PROCEDURE — 3074F SYST BP LT 130 MM HG: CPT | Performed by: INTERNAL MEDICINE

## 2023-11-28 PROCEDURE — 3079F DIAST BP 80-89 MM HG: CPT | Performed by: INTERNAL MEDICINE

## 2023-11-28 RX ORDER — DULOXETIN HYDROCHLORIDE 60 MG/1
60 CAPSULE, DELAYED RELEASE ORAL DAILY
COMMUNITY

## 2023-11-28 RX ORDER — ONDANSETRON HYDROCHLORIDE 8 MG/1
8 TABLET, FILM COATED ORAL EVERY 8 HOURS PRN
COMMUNITY
Start: 2023-11-02

## 2023-11-28 NOTE — PROGRESS NOTES
[facility] Cardiology  Follow Up Visit  Lux Mckoy  1970    VISIT DATE:  11/28/23    PCP:   Emani Alas PA  [address]  [place] [address]          CC:  Acute ischemic stroke, Chest Pain, and Hypertension      Problem List:  Chest pain/shortness of breath:   Pulmonary evaluation.   Chronic pulmonary nodule, overall unchanged, followed by Dr. Mcknight and Gloria.   Echocardiogram 6/7/16: EF 55%, no sig valvular or structural disease  Stress test 6/7/2016: EF 63%, no ischemia on myocardial perfusion imaging.   PET Stress Test 3/2020:No evidence of ischemia, EF 63%  Cardiac cath February 2022: Normal coronary arteries, LVEDP 11  Embolic CVA  CT head 3/22/20: Abnormal low-attenuation region left frontoparietal posterior left MCA distribution involvement extending to involve the cortex with sulcal effacement concerning for acute or evolving infarction without intracranial hemorrhage  CTA head/neck 3/22/20: No large vessel occlusion including only minimal atherosclerotic involvement of the carotid bifurcations, however within the MCA distributions bilaterally, there are mild to moderate irregularities concerning for atherosclerotic involvement without distinct occlusion left greater than right of the M3 and M4 segments in particular.  CT perfusion with and without contrast 3/22/20: Reversible ischemia within the left posterior MCA distribution without evidence for core infarct component.  Echocardiogram 3/22/20: EF 60%, no sig valvular or structural disease. Negative bubble study.   Carotid duplex 3/22/20: no stenosis.   MRI brain 3/22/20: Abnormal restriction throughout the bilateral cerebral hemispheres. Multifocal appearance greatest region of fairly confluent restriction in the left temporoparietal region as seen on perfusion and prior imaging same day consistent with acute multifocal infarctions of concern for embolic etiology given bilateral and multifocal distribution. No  midline shift or hydrocephalus.  CATY 3/24/2020: EF 60%, negative saline study, normal valvular anatomy and function.   Atrial tachycardia  14 day monitor: No AFib, PACs <1%, Short episodes of atrial tachycardia  Loop recorder in place, no A. fib detected  Hypertension.   Hyperlipidemia  IgA nephropathy.   GERD.   Depression.   Migraines.   BPH.   Degenerative disease.   Osteoarthritis.   Salvador's esophagus.   Tobacco abuse.   Surgical Hx:   Remote back surgery x2.   Three right hand surgeries and 2 left hand surgery.         History of Present Illness:  Lux Mckoy  Is a 53 y.o. male with pertinent cardiac history detailed above.  Loop recorder interrogation today shows no events.  He complains of intermittent lightheadedness and dizziness.  1 area of his stroke was right cerebellum discussed this may be a persistent residual effect from the stroke.  He also continues to have atypical chest pains.  This has been a relatively chronic issue.  Underwent a cardiac cath in 2022 with normal coronary arteries.  He remains on Eliquis anticoagulation with no additional stroke events.  On patient's last lipids his LDL was well-controlled at 50.      Patient Active Problem List    Diagnosis Date Noted    Dupuytren's disease 08/23/2022    Shortness of breath 08/23/2022    Chest pain 02/22/2022     Note Last Updated: 2/25/2022     [facility] 2/25/22: Normal coronary arteries, LVEDP 11 mmHg.      ANI (obstructive sleep apnea) 05/28/2021    CVA (cerebral vascular accident)     Anxiety     Arthritis     ASVD (arteriosclerotic vascular disease)     Benign prostate hyperplasia     Degenerative cervical disc     Degeneration of lumbar intervertebral disc     Dyspepsia     Fatigue     Gastritis     Hyperlipidemia     IgA nephropathy     Muscle spasm     Prostate disease     RA (rheumatoid arthritis)     B12 deficiency     Vitamin D deficiency     Confusion 07/29/2020    Expressive aphasia 03/25/2020    Elevated troponin  03/24/2020    Acute ischemic stroke 03/22/2020    Cerebrovascular atherosclerosis seen on imaging 03/22/2020    Tobacco use 03/22/2020    CKD (chronic kidney disease) stage 3, GFR 30-59 ml/min 03/22/2020    Microscopic hematuria 07/23/2019    Nausea 01/07/2019    Radicular pain 12/17/2018    Arthrodesis status 08/30/2018    Preoperative examination, unspecified 07/26/2018    Specimen positive for methicillin resistant Staphylococcus aureus (MRSA) 07/13/2018    Pseudoarthrosis of spine 09/11/2017    Abnormal levels of other serum enzymes 06/23/2017    Neck pain 12/23/2016    Spinal stenosis of cervical region 09/14/2016    Acute frontal sinusitis 08/05/2016    Allergic rhinitis 08/01/2016    Salvador esophagus 08/01/2016    Chronic pain 08/01/2016    Chronic prostatitis 08/01/2016    Depression 08/01/2016    Dyslipidemia 08/01/2016    GERD (gastroesophageal reflux disease) 08/01/2016    Hypertension 08/01/2016    Hypogonadism in male 08/01/2016    Neuropathy with IgA monoclonal gammopathy 08/01/2016    Migraine 08/01/2016    Neuropathy 08/01/2016    Pulmonary nodule 08/01/2016     Note Last Updated: 8/1/2016      · A.  Ct scan of 3/25/2016 reports a 11 mm noncalcified stellate nodule in the lateral      portion of the right lower lobe.B. stable on CT scans of the abdomen and pelvis at      [facility] between 1/2015 and 3/2016.      Vertigo 08/01/2016    Tinea versicolor 08/01/2016       Allergies   Allergen Reactions    Amoxicillin Unknown - Low Severity    Augmentin [Amoxicillin-Pot Clavulanate] Itching    Bactrim [Sulfamethoxazole-Trimethoprim] Itching    Clavulanic Acid Unknown - Low Severity    Sulfamethoxazole Unknown - Low Severity    Trimethoprim Unknown - Low Severity       Social History     Socioeconomic History    Marital status:    Tobacco Use    Smoking status: Some Days     Packs/day: .25     Types: Cigarettes    Smokeless tobacco: Never   Vaping Use    Vaping Use: Never used    Substance and Sexual Activity    Alcohol use: Never    Drug use: Never    Sexual activity: Defer       Family History   Problem Relation Age of Onset    Hypertension Other     Other Other     Hypertension Mother     Heart disease Father     Hypertension Father     Diabetes Father     Arthritis Neg Hx     Hyperlipidemia Neg Hx        Current Medications:    Current Outpatient Medications:     acetaminophen (TYLENOL) 325 MG tablet, Take 2 tablets by mouth Every 4 (Four) Hours As Needed for Mild Pain  or Headache. (Patient taking differently: Take 2 tablets by mouth Daily As Needed for Mild Pain or Headache.), Disp: , Rfl:     aspirin 81 MG EC tablet, Take 1 tablet by mouth Daily., Disp: , Rfl:     atorvastatin (LIPITOR) 40 MG tablet, Take 1 tablet by mouth Daily., Disp: 30 tablet, Rfl: 11    azelastine (ASTELIN) 0.1 % nasal spray, 1 spray into the nostril(s) as directed by provider., Disp: , Rfl:     cetirizine (ZyrTEC) 10 MG tablet, Take 1 tablet by mouth Daily As Needed for allergies. (Patient taking differently: Take 1 tablet by mouth Daily.), Disp: 30 tablet, Rfl: 2    Cholecalciferol (Vitamin D3) 25 MCG (1000 UT) capsule, Take 1 capsule by mouth Daily., Disp: , Rfl:     donepezil (Aricept) 5 MG tablet, Take 1 tablet by mouth Every Night., Disp: 30 tablet, Rfl: 9    DULoxetine (CYMBALTA) 60 MG capsule, Take 1 capsule by mouth Daily., Disp: , Rfl:     Eliquis 5 MG tablet tablet, TAKE 1 TABLET BY MOUTH EVERY 12 HOURS. CONTACT NEUROLOGY OFFICE TO. FOLLOW UP FOR FURTHER REFILLS, Disp: 60 tablet, Rfl: 3    famotidine (PEPCID) 20 MG tablet, Take 0.5 tablets by mouth 2 (Two) Times a Day., Disp: , Rfl:     folic acid (FOLVITE) 1 MG tablet, Take 1 tablet by mouth Daily., Disp: , Rfl:     furosemide (LASIX) 20 MG tablet, Take 1 tablet by mouth Daily As Needed (edema)., Disp: 90 tablet, Rfl: 1    gabapentin (NEURONTIN) 400 MG capsule, Take 300 mg by mouth 3 (Three) Times a Day., Disp: , Rfl:     lisinopril  "(PRINIVIL,ZESTRIL) 2.5 MG tablet, Take 1 tablet by mouth Daily., Disp: , Rfl:     meclizine (ANTIVERT) 25 MG tablet, Take 1 tablet by mouth As Needed., Disp: , Rfl:     nicotine (Nicoderm CQ) 21 MG/24HR patch, Place 1 patch on the skin as directed by provider Daily. (Patient taking differently: Place 1 patch on the skin as directed by provider Daily. Has been off of this), Disp: 28 each, Rfl: 3    omega-3 acid ethyl esters (LOVAZA) 1 g capsule, Take 1 capsule by mouth Daily., Disp: , Rfl:     ondansetron (ZOFRAN) 8 MG tablet, Take 1 tablet by mouth Every 8 (Eight) Hours As Needed., Disp: , Rfl:     oxyCODONE-acetaminophen (PERCOCET)  MG per tablet, Take 1 tablet by mouth 3 (Three) Times a Day., Disp: , Rfl:     pantoprazole (PROTONIX) 40 MG EC tablet, Take 1 tablet by mouth Daily., Disp: , Rfl:     promethazine (PHENERGAN) 25 MG tablet, TAKE 1 TABLET BY MOUTH EVERY 4 TO 6 HOURS AS NEEDED FOR NAUSEA, Disp: , Rfl:     rizatriptan (MAXALT) 10 MG tablet, Take 1 tablet by mouth As Needed for Migraine. May repeat in 2 hours if needed, Disp: , Rfl:      Review of Systems   Cardiovascular:  Positive for chest pain.   Neurological:  Positive for dizziness and light-headedness.       Vitals:    11/28/23 1130   BP: 124/84   BP Location: Right arm   Patient Position: Sitting   Cuff Size: Adult   Pulse: 75   SpO2: 96%   Weight: 93.8 kg (206 lb 12.8 oz)   Height: 188 cm (74.02\")       Physical Exam  Constitutional:       Appearance: Normal appearance.   Cardiovascular:      Rate and Rhythm: Normal rate and regular rhythm.      Pulses: Normal pulses.      Heart sounds: Normal heart sounds.   Pulmonary:      Effort: Pulmonary effort is normal.      Breath sounds: Normal breath sounds.   Musculoskeletal:      Right lower leg: No edema.      Left lower leg: No edema.   Neurological:      Mental Status: He is alert.         Diagnostic Data:  Procedures  Lab Results   Component Value Date    CHLPL 128 07/29/2020    TRIG 198 (H) " 02/25/2022    HDL 30 (L) 02/25/2022     Lab Results   Component Value Date    GLUCOSE 97 02/25/2022    BUN 12 06/18/2022    CREATININE 1.39 (H) 06/18/2022     06/18/2022    K 3.9 06/18/2022     06/18/2022    CO2 25 06/18/2022     Lab Results   Component Value Date    HGBA1C 5.40 02/25/2022     Lab Results   Component Value Date    WBC 17.16 (H) 06/18/2022    HGB 12.4 (L) 06/18/2022    HCT 37.4 (L) 06/18/2022     06/18/2022       Assessment:   Diagnosis Plan   1. Dizziness  Duplex Carotid Ultrasound CAR    Adult Transthoracic Echo Complete W/ Cont if Necessary Per Protocol          Plan:    1.  Chest pain  -Appears noncardiac, cardiac catheterization 2022 with normal coronary arteries  -Still present, not recommending additional cardiac testing at this time     2..  Hypertension  -Within normal limits today     3.  CKD  -Last creatinine 1.39  -On low-dose lisinopril 2.5     4.  History of CVA, left fontal, parietal, right cerebellar  -Admitted 3/2020 with concern for embolic source  -CATY with no PFO  -Previous CTA head neck with atherosclerosis but no obstructive stenosis  -Loop recorder in place with no evidence of A. Fib  -He does continue on empiric Eliquis without recurrent stroke  -Last LDL 51  -Repeating carotid duplex and echo due to persistent complaints of dizziness  -This may be a chronic residual issue due to cerebellar involvement and history     5.  Tobacco use  -1/2 ppd smoker.  He does continue to smoke, advised benefits of cessation      Follow-up in 1 year.  Will contact him with the echo and duplex results    ACP discussion was held with the patient during this visit. Patient does not have an advance directive, declines further assistance.      Joey eVlazquez MD [facility]

## 2023-12-12 ENCOUNTER — TELEPHONE (OUTPATIENT)
Dept: CARDIOLOGY | Facility: CLINIC | Age: 53
End: 2023-12-12
Payer: COMMERCIAL

## 2023-12-12 NOTE — TELEPHONE ENCOUNTER
Called Mr Mckoy due to [employer] monitor hasn't read loop since 11/13/23.  Loop was implanted 7/30/20.  Could be that loop is an end of battery.  Left message for a return call.

## 2023-12-29 ENCOUNTER — HOSPITAL ENCOUNTER (OUTPATIENT)
Dept: HOSPITAL 22 - LAB | Age: 53
End: 2023-12-29
Payer: MEDICAID

## 2023-12-29 DIAGNOSIS — N18.30: Primary | ICD-10-CM

## 2023-12-29 DIAGNOSIS — R80.9: ICD-10-CM

## 2023-12-29 DIAGNOSIS — I10: ICD-10-CM

## 2023-12-29 LAB
25-OH VITAMIN D, TOTAL: 44.1 NG/ML (ref 30–100)
ALBUMIN LEVEL: 4.4 G/DL (ref 3.5–5)
ALBUMIN/GLOB SERPL: 1.6 {RATIO} (ref 1.1–1.8)
ALP ISO SERPL-ACNC: 82 U/L (ref 38–126)
ALT SERPLBLD-CCNC: 106 U/L (ref 12–78)
ANION GAP SERPL CALC-SCNC: 11.5 MEQ/L (ref 5–15)
AST SERPL QL: 90 U/L (ref 17–59)
BILIRUBIN,TOTAL: 0.6 MG/DL (ref 0.2–1.3)
BUN SERPL-MCNC: 16 MG/DL (ref 9–20)
CALCIUM SPEC-MCNC: 8.8 MG/DL (ref 8.4–10.2)
CHLORIDE SPEC-SCNC: 108 MMOL/L (ref 98–107)
CO2 SERPL-SCNC: 24 MMOL/L (ref 22–30)
COLOR UR: YELLOW
CREATININE,SERUM: 1.5 MG/DL (ref 0.66–1.25)
ESTIMATED GLOMERULAR FILT RATE: 49 ML/MIN (ref 60–?)
GFR (AFRICAN AMERICAN): 59 ML/MIN (ref 60–?)
GLOBULIN SER CALC-MCNC: 2.7 G/DL (ref 1.3–3.2)
GLUCOSE: 92 MG/DL (ref 74–100)
HCT VFR BLD CALC: 46.5 % (ref 42–52)
HGB BLD-MCNC: 16.2 G/DL (ref 14.1–18)
MCHC RBC-ENTMCNC: 35 G/DL (ref 31.8–35.4)
MCV RBC: 88.3 FL (ref 80–94)
MEAN CORPUSCULAR HEMOGLOBIN: 30.9 PG (ref 27–31.2)
MICRO URNS: (no result)
PH UR: 6 [PH] (ref 5–8.5)
PLATELET # BLD: 254 K/MM3 (ref 142–424)
POTASSIUM: 4.5 MMOL/L (ref 3.5–5.1)
PROT SERPL-MCNC: 7.1 G/DL (ref 6.3–8.2)
PROT UR STRIP-MCNC: (no result) MG/DL
RBC # BLD AUTO: 5.26 M/MM3 (ref 4.6–6.2)
RBC #/AREA URNS HPF: (no result) #/HPF (ref 0–3)
SODIUM SPEC-SCNC: 139 MMOL/L (ref 136–145)
SP GR UR: 1.02 (ref 1–1.03)
UROBILINOGEN UR QL: 0.2 EU/DL
WBC # BLD AUTO: 9.6 K/MM3 (ref 4.8–10.8)

## 2023-12-29 PROCEDURE — 81001 URINALYSIS AUTO W/SCOPE: CPT

## 2023-12-29 PROCEDURE — 36415 COLL VENOUS BLD VENIPUNCTURE: CPT

## 2023-12-29 PROCEDURE — 80053 COMPREHEN METABOLIC PANEL: CPT

## 2023-12-29 PROCEDURE — 82570 ASSAY OF URINE CREATININE: CPT

## 2023-12-29 PROCEDURE — 85014 HEMATOCRIT: CPT

## 2023-12-29 PROCEDURE — 83970 ASSAY OF PARATHORMONE: CPT

## 2023-12-29 PROCEDURE — 85018 HEMOGLOBIN: CPT

## 2023-12-29 PROCEDURE — 85049 AUTOMATED PLATELET COUNT: CPT

## 2023-12-29 PROCEDURE — 85048 AUTOMATED LEUKOCYTE COUNT: CPT

## 2023-12-29 PROCEDURE — 84155 ASSAY OF PROTEIN SERUM: CPT

## 2023-12-29 PROCEDURE — 82306 VITAMIN D 25 HYDROXY: CPT

## 2024-01-23 ENCOUNTER — HOSPITAL ENCOUNTER (OUTPATIENT)
Dept: CARDIOLOGY | Facility: HOSPITAL | Age: 54
Discharge: HOME | End: 2024-01-23
Payer: COMMERCIAL

## 2024-01-23 DIAGNOSIS — R42 DIZZINESS: ICD-10-CM

## 2024-01-23 LAB
BH CV ECHO MEAS - AO MAX PG: 4.1 MMHG
BH CV ECHO MEAS - AO MEAN PG: 2.45 MMHG
BH CV ECHO MEAS - AO ROOT DIAM: 4.5 CM
BH CV ECHO MEAS - AO V2 MAX: 100.8 CM/SEC
BH CV ECHO MEAS - AO V2 VTI: 25.4 CM
BH CV ECHO MEAS - AVA(I,D): 2.9 CM2
BH CV ECHO MEAS - EDV(CUBED): 105.1 ML
BH CV ECHO MEAS - EDV(MOD-SP2): 95 ML
BH CV ECHO MEAS - EDV(MOD-SP4): 127 ML
BH CV ECHO MEAS - EF(MOD-BP): 60 %
BH CV ECHO MEAS - EF(MOD-SP2): 57.9 %
BH CV ECHO MEAS - EF(MOD-SP4): 65.4 %
BH CV ECHO MEAS - ESV(CUBED): 17.9 ML
BH CV ECHO MEAS - ESV(MOD-SP2): 40 ML
BH CV ECHO MEAS - ESV(MOD-SP4): 44 ML
BH CV ECHO MEAS - FS: 44.5 %
BH CV ECHO MEAS - IVS/LVPW: 0.87 CM
BH CV ECHO MEAS - IVSD: 1.01 CM
BH CV ECHO MEAS - LA DIMENSION: 3.3 CM
BH CV ECHO MEAS - LV MASS(C)D: 184.5 GRAMS
BH CV ECHO MEAS - LV MAX PG: 3.7 MMHG
BH CV ECHO MEAS - LV MEAN PG: 1.76 MMHG
BH CV ECHO MEAS - LV V1 MAX: 96.5 CM/SEC
BH CV ECHO MEAS - LV V1 VTI: 20.5 CM
BH CV ECHO MEAS - LVIDD: 4.7 CM
BH CV ECHO MEAS - LVIDS: 2.6 CM
BH CV ECHO MEAS - LVOT AREA: 3.6 CM2
BH CV ECHO MEAS - LVOT DIAM: 2.14 CM
BH CV ECHO MEAS - LVPWD: 1.16 CM
BH CV ECHO MEAS - MV A MAX VEL: 57.2 CM/SEC
BH CV ECHO MEAS - MV DEC TIME: 0.24 SEC
BH CV ECHO MEAS - MV E MAX VEL: 59 CM/SEC
BH CV ECHO MEAS - MV E/A: 1.03
BH CV ECHO MEAS - MV MAX PG: 2.03 MMHG
BH CV ECHO MEAS - MV MEAN PG: 1.15 MMHG
BH CV ECHO MEAS - MV V2 VTI: 27.2 CM
BH CV ECHO MEAS - MVA(VTI): 2.7 CM2
BH CV ECHO MEAS - PA ACC SLOPE: 320.4 CM/SEC2
BH CV ECHO MEAS - PA ACC TIME: 0.2 SEC
BH CV ECHO MEAS - PA V2 MAX: 75 CM/SEC
BH CV ECHO MEAS - RAP SYSTOLE: 3 MMHG
BH CV ECHO MEAS - RVSP: 19 MMHG
BH CV ECHO MEAS - SV(LVOT): 74.1 ML
BH CV ECHO MEAS - SV(MOD-SP2): 55 ML
BH CV ECHO MEAS - SV(MOD-SP4): 83 ML
BH CV ECHO MEAS - TR MAX PG: 16.9 MMHG
BH CV ECHO MEAS - TR MAX VEL: 205.6 CM/SEC
BH CV VAS BP RIGHT ARM: NORMAL MMHG
BH CV XLRA MEAS LEFT DIST CCA EDV: 30.8 CM/SEC
BH CV XLRA MEAS LEFT DIST CCA PSV: 77.8 CM/SEC
BH CV XLRA MEAS LEFT DIST ICA EDV: 30.8 CM/SEC
BH CV XLRA MEAS LEFT DIST ICA PSV: 80.6 CM/SEC
BH CV XLRA MEAS LEFT ICA/CCA RATIO: 1.17
BH CV XLRA MEAS LEFT MID CCA EDV: 33.6 CM/SEC
BH CV XLRA MEAS LEFT MID CCA PSV: 82 CM/SEC
BH CV XLRA MEAS LEFT MID ICA EDV: 35 CM/SEC
BH CV XLRA MEAS LEFT MID ICA PSV: 72.2 CM/SEC
BH CV XLRA MEAS LEFT PROX CCA EDV: 32.2 CM/SEC
BH CV XLRA MEAS LEFT PROX CCA PSV: 82 CM/SEC
BH CV XLRA MEAS LEFT PROX ECA EDV: 32.2 CM/SEC
BH CV XLRA MEAS LEFT PROX ECA PSV: 90.4 CM/SEC
BH CV XLRA MEAS LEFT PROX ICA EDV: 34.3 CM/SEC
BH CV XLRA MEAS LEFT PROX ICA PSV: 96 CM/SEC
BH CV XLRA MEAS LEFT PROX SCLA PSV: 115.6 CM/SEC
BH CV XLRA MEAS LEFT VERTEBRAL A EDV: 20.1 CM/SEC
BH CV XLRA MEAS LEFT VERTEBRAL A PSV: 42.8 CM/SEC
BH CV XLRA MEAS RIGHT DIST CCA EDV: 31.7 CM/SEC
BH CV XLRA MEAS RIGHT DIST CCA PSV: 85.7 CM/SEC
BH CV XLRA MEAS RIGHT DIST ICA EDV: 30.4 CM/SEC
BH CV XLRA MEAS RIGHT DIST ICA PSV: 60.3 CM/SEC
BH CV XLRA MEAS RIGHT ICA/CCA RATIO: 1
BH CV XLRA MEAS RIGHT MID CCA EDV: 31.7 CM/SEC
BH CV XLRA MEAS RIGHT MID CCA PSV: 82.6 CM/SEC
BH CV XLRA MEAS RIGHT MID ICA EDV: 36.7 CM/SEC
BH CV XLRA MEAS RIGHT MID ICA PSV: 92.6 CM/SEC
BH CV XLRA MEAS RIGHT PROX CCA EDV: 29.2 CM/SEC
BH CV XLRA MEAS RIGHT PROX CCA PSV: 90.1 CM/SEC
BH CV XLRA MEAS RIGHT PROX ECA EDV: 30.4 CM/SEC
BH CV XLRA MEAS RIGHT PROX ECA PSV: 91.9 CM/SEC
BH CV XLRA MEAS RIGHT PROX ICA EDV: 21.7 CM/SEC
BH CV XLRA MEAS RIGHT PROX ICA PSV: 52.2 CM/SEC
BH CV XLRA MEAS RIGHT PROX SCLA PSV: 86.4 CM/SEC
BH CV XLRA MEAS RIGHT VERTEBRAL A EDV: 12.5 CM/SEC
BH CV XLRA MEAS RIGHT VERTEBRAL A PSV: 36.5 CM/SEC
LEFT ARM BP: NORMAL MMHG
LEFT ATRIUM VOLUME INDEX: 21.4 ML/M2
RIGHT ARM BP: NORMAL MMHG

## 2024-01-23 PROCEDURE — 93880 EXTRACRANIAL BILAT STUDY: CPT | Performed by: INTERNAL MEDICINE

## 2024-01-23 PROCEDURE — 93306 TTE W/DOPPLER COMPLETE: CPT | Performed by: INTERNAL MEDICINE

## 2024-01-23 PROCEDURE — 93880 EXTRACRANIAL BILAT STUDY: CPT

## 2024-01-23 PROCEDURE — 93306 TTE W/DOPPLER COMPLETE: CPT

## 2024-01-24 ENCOUNTER — TELEPHONE (OUTPATIENT)
Dept: CARDIOLOGY | Facility: CLINIC | Age: 54
End: 2024-01-24
Payer: COMMERCIAL

## 2024-01-24 NOTE — TELEPHONE ENCOUNTER
----- Message from Joey Velazquez MD sent at 1/23/2024  5:02 PM EST -----  Can we let patient know that his carotid arteries look good with no evidence of blockage on either side.  Echo shows normal pumping function of the heart, all valves working well.  He does have some mild enlargement of the aorta at that time will monitor with follow-up echoes in a couple years time.  I am thinking his dizziness could be residual effect of his past stroke, nothing on these test that would account for it

## 2024-01-26 ENCOUNTER — TELEPHONE (OUTPATIENT)
Dept: CARDIOLOGY | Facility: CLINIC | Age: 54
End: 2024-01-26
Payer: COMMERCIAL

## 2024-01-26 NOTE — TELEPHONE ENCOUNTER
Spoke with  ans ask him to send a reading on his loop recorder. He stated he would once he gets home.

## 2024-01-30 DIAGNOSIS — I69.30 SEQUELAE, POST-STROKE: ICD-10-CM

## 2024-02-01 RX ORDER — APIXABAN 5 MG/1
TABLET, FILM COATED ORAL
Qty: 60 TABLET | Refills: 3 | Status: SHIPPED | OUTPATIENT
Start: 2024-02-01

## 2024-02-01 NOTE — TELEPHONE ENCOUNTER
Rx Refill Note  Requested Prescriptions     Pending Prescriptions Disp Refills    Eliquis 5 MG tablet tablet [Pharmacy Med Name: ELIQUIS 5MG TABLETS] 60 tablet 3     Sig: TAKE 1 TABLET BY MOUTH EVERY 12 HOURS. CONTACT NEUROLOGY OFFICE TO. FOLLOW UP FOR FURTHER REFILLS      Last filled:09/29/2023  Last office visit with prescribing clinician: 7/13/2023      Next office visit with prescribing clinician: 3/13/2024     Fina Limon MA  02/01/24, 14:31 EST    I sent the Rx to the pharmacy.

## 2024-03-13 ENCOUNTER — TELEPHONE (OUTPATIENT)
Dept: CARDIOLOGY | Facility: CLINIC | Age: 54
End: 2024-03-13
Payer: COMMERCIAL

## 2024-03-13 ENCOUNTER — OFFICE VISIT (OUTPATIENT)
Dept: NEUROLOGY | Facility: CLINIC | Age: 54
End: 2024-03-13
Payer: COMMERCIAL

## 2024-03-13 VITALS
HEIGHT: 74 IN | SYSTOLIC BLOOD PRESSURE: 132 MMHG | DIASTOLIC BLOOD PRESSURE: 86 MMHG | HEART RATE: 86 BPM | WEIGHT: 212 LBS | BODY MASS INDEX: 27.21 KG/M2 | OXYGEN SATURATION: 98 %

## 2024-03-13 DIAGNOSIS — F17.200 SMOKER: ICD-10-CM

## 2024-03-13 DIAGNOSIS — G47.33 OBSTRUCTIVE SLEEP APNEA: ICD-10-CM

## 2024-03-13 DIAGNOSIS — I69.30 SEQUELAE, POST-STROKE: Primary | ICD-10-CM

## 2024-03-13 DIAGNOSIS — G31.84 MILD COGNITIVE IMPAIRMENT: ICD-10-CM

## 2024-03-13 PROCEDURE — 3075F SYST BP GE 130 - 139MM HG: CPT | Performed by: PSYCHIATRY & NEUROLOGY

## 2024-03-13 PROCEDURE — 1160F RVW MEDS BY RX/DR IN RCRD: CPT | Performed by: PSYCHIATRY & NEUROLOGY

## 2024-03-13 PROCEDURE — 3079F DIAST BP 80-89 MM HG: CPT | Performed by: PSYCHIATRY & NEUROLOGY

## 2024-03-13 PROCEDURE — 99214 OFFICE O/P EST MOD 30 MIN: CPT | Performed by: PSYCHIATRY & NEUROLOGY

## 2024-03-13 PROCEDURE — 1159F MED LIST DOCD IN RCRD: CPT | Performed by: PSYCHIATRY & NEUROLOGY

## 2024-03-13 RX ORDER — DONEPEZIL HYDROCHLORIDE 10 MG/1
10 TABLET, FILM COATED ORAL NIGHTLY
Qty: 30 TABLET | Refills: 11 | Status: SHIPPED | OUTPATIENT
Start: 2024-03-13 | End: 2025-03-13

## 2024-03-13 NOTE — PROGRESS NOTES
"Subjective:    CC: Lux Mckoy is seen today  for stroke    HPI:  Current visit-patient denies any new strokelike symptoms but continues to have short-term memory problems marked by forgetfulness despite starting donepezil 5 mg daily.  His loop recorder has failed to show any atrial fibrillation till date.  He was also having dizziness therefore cardiology ordered an echocardiogram for him that showed an EF of 55 to 60% with mild mitral and tricuspid valve regurg and a carotid Doppler that showed minimal stenosis bilaterally.  He has continued to take aspirin along with Eliquis and Lipitor.  He is still not using his CPAP since it was recalled and also continues to smoke.  Takes gabapentin and oxycodone as he has had multiple back surgeries.  Keeps busy by working around the house and with his chickens at home.    Last visit-patient denies having any strokelike symptoms since he last saw me in October 2021.  His loop recorder has not shown any A-fib till date.  He continues to take aspirin along with Eliquis and Lipitor 40 mg daily.  His last lipid panel was as follows-, , LDL 50, HDL 38.  GFR was 53 and A1c was normal.  He continues to have short-term memory problems marked by forgetfulness.  Did not go for speech therapy as he could not afford \"money for gas \".  He still feels tired and lethargic during the day.  Is not very compliant with his CPAP.      Last visit-patient states that he continues to have memory problems where he has difficulty recalling names and words.  He also still has fatigue.  Denies any more episodes of confusion, weakness or numbness.  Continues to take Eliquis in addition to aspirin and Lipitor 40 mg.  He had his sleep study that showed moderate obstructive sleep apnea for which he was given a CPAP however it was recalled recently due to a faulty filter but he was told that he could start using it again as it was a minor fall.  Patient also had repeat testosterone levels " that were on the lower side of normal (in the 200s).  He continues to smoke about half a pack of cigarettes daily.  Has lost some weight recently.    Last visit-patient denies having any new strokelike symptoms since his last visit but he continues to be extremely tired during the day.  His wife also feels that he has short-term memory problems ever since he had the stroke.  Patient does report to snoring at night.  Continues to take aspirin 81 mg daily along with Eliquis and Lipitor 40 mg daily.  Thus far the loop recorder has not shown any evidence of atrial fibrillation.  He has stopped taking Depakote and has reduced his dose of gabapentin 400 mg to 1 to 2 tablets at night.  Also takes opiates for his back pain and Zoloft for his mood.    Last visit-since the last visit patient had symptoms of bilateral hand tremors with more memory problems in July.  His wife took him to the hospital where he had a MRI scan of the brain which showed chronic bilateral infarcts but no acute abnormalities.  At the hospital patient was started on Eliquis 5 mg twice a day and cardiology also put him a loop recorder.  Prior to that his Holter monitor showed atrial tachycardia but no A. fib.  He was supposed to follow-up with outpatient cardiology but then all of these symptoms occurred.  He also continues to take aspirin 325 mg daily and Lipitor 80 mg daily.  His last lipid panel was as follows-, , LDL 35, HDL 28.  A1c was 5.  At the hospital he was also started on Depakote  mg 3 times a day for the pain in his legs and mood changes however patient states that his mood is fine and the Depakote does not help with the pain.  He is on gabapentin 300 mg twice a day and his pain physician will increase it to 400 mg 3 times daily soon for his chronic low back pain.  Patient has completely quit smoking now.   Of note-I personally reviewed his MRI scan of the brain and Dr. Davison's notes from the hospital.     Initial  [id number] male with a history of chronic smoking, CKD (secondary to IgA nephropathy), chronic back pain status post 2 neck and 2 low back surgeries presents with a stroke.  As per patient's wife he started having symptoms of staggering gait with slurring of speech, confusion and right-sided numbness around 3/21.  He went to the hospital a day later where he had extensive testing.  CT scan of the head showed a hyper density in the left posterior parietal region.  CT perfusion also showed a perfusion deficit in that area.  CT angiogram of the head and neck showed mild to moderate stenosis in bilateral distal MCAs left more than right as well as atherosclerotic plaques in both carotid bifurcations with no major stenosis.  MRI brain showed bilateral embolic infarcts in multiple vascular territories with the largest one being in the left posterior parietal region.  He had a TTE andTEE both of which showed a normal EF with no valvular abnormalities or PFO as well as no mass.  He also had a stress test which showed normal cardiac perfusion.  Patient states that he had also been having chest pains for the past few months with occasional palpitations.  Troponins were mildly elevated.  He was evaluated by cardiology in the hospital who felt he could be maintained on dual antiplatelets as well as Lipitor 80 mg daily.  His lipid panel was as follows-, , LDL 83, HDL 28.  A1c was 5.4.  Patient was smoking half a pack of cigarettes every day prior to his stroke but has quit smoking now.  He underwent PT/speech therapy after discharge but has now stopped due to COVID.  As per his wife he continues to get mild memory problems and speech deficits even now.  Of note-I personally reviewed his MRI brain and Dr. Larson's note from the hospital.    The following portions of the patient's history were reviewed today and updated as of 05/21/2020  : allergies, current medications, past family history, past medical  history, past social history, past surgical history and problem list  These document will be scanned to patient's chart.      Current Outpatient Medications:     acetaminophen (TYLENOL) 325 MG tablet, Take 2 tablets by mouth Every 4 (Four) Hours As Needed for Mild Pain  or Headache. (Patient taking differently: Take 2 tablets by mouth Daily As Needed for Mild Pain or Headache.), Disp: , Rfl:     aspirin 81 MG EC tablet, Take 1 tablet by mouth Daily., Disp: , Rfl:     atorvastatin (LIPITOR) 40 MG tablet, Take 1 tablet by mouth Daily., Disp: 30 tablet, Rfl: 11    azelastine (ASTELIN) 0.1 % nasal spray, 1 spray into the nostril(s) as directed by provider., Disp: , Rfl:     cetirizine (ZyrTEC) 10 MG tablet, Take 1 tablet by mouth Daily As Needed for allergies. (Patient taking differently: Take 1 tablet by mouth Daily.), Disp: 30 tablet, Rfl: 2    Cholecalciferol (Vitamin D3) 25 MCG (1000 UT) capsule, Take 1 capsule by mouth Daily., Disp: , Rfl:     DULoxetine (CYMBALTA) 60 MG capsule, Take 1 capsule by mouth Daily., Disp: , Rfl:     Eliquis 5 MG tablet tablet, TAKE 1 TABLET BY MOUTH EVERY 12 HOURS. CONTACT NEUROLOGY OFFICE TO. FOLLOW UP FOR FURTHER REFILLS, Disp: 60 tablet, Rfl: 3    famotidine (PEPCID) 20 MG tablet, Take 0.5 tablets by mouth 2 (Two) Times a Day., Disp: , Rfl:     folic acid (FOLVITE) 1 MG tablet, Take 1 tablet by mouth Daily., Disp: , Rfl:     furosemide (LASIX) 20 MG tablet, Take 1 tablet by mouth Daily As Needed (edema)., Disp: 90 tablet, Rfl: 1    gabapentin (NEURONTIN) 400 MG capsule, Take 300 mg by mouth 3 (Three) Times a Day., Disp: , Rfl:     lisinopril (PRINIVIL,ZESTRIL) 2.5 MG tablet, Take 1 tablet by mouth Daily., Disp: , Rfl:     meclizine (ANTIVERT) 25 MG tablet, Take 1 tablet by mouth As Needed., Disp: , Rfl:     nicotine (Nicoderm CQ) 21 MG/24HR patch, Place 1 patch on the skin as directed by provider Daily. (Patient taking differently: Place 1 patch on the skin as directed by provider  Daily. Has been off of this), Disp: 28 each, Rfl: 3    omega-3 acid ethyl esters (LOVAZA) 1 g capsule, Take 1 capsule by mouth Daily., Disp: , Rfl:     ondansetron (ZOFRAN) 8 MG tablet, Take 1 tablet by mouth Every 8 (Eight) Hours As Needed., Disp: , Rfl:     oxyCODONE-acetaminophen (PERCOCET)  MG per tablet, Take 1 tablet by mouth 3 (Three) Times a Day., Disp: , Rfl:     pantoprazole (PROTONIX) 40 MG EC tablet, Take 1 tablet by mouth Daily., Disp: , Rfl:     promethazine (PHENERGAN) 25 MG tablet, TAKE 1 TABLET BY MOUTH EVERY 4 TO 6 HOURS AS NEEDED FOR NAUSEA, Disp: , Rfl:     rizatriptan (MAXALT) 10 MG tablet, Take 1 tablet by mouth As Needed for Migraine. May repeat in 2 hours if needed, Disp: , Rfl:     donepezil (Aricept) 10 MG tablet, Take 1 tablet by mouth Every Night., Disp: 30 tablet, Rfl: 11   Past Medical History:   Diagnosis Date    Allergic rhinitis     Anxiety     Arthritis     ASVD (arteriosclerotic vascular disease)     B12 deficiency     Salvador's esophagus     Benign prostate hyperplasia     Confusion     CVA (cerebral vascular accident)     Degeneration of lumbar intervertebral disc     Degenerative cervical disc     Depression     Dupuytren contracture     s/p bilateral hand surgery    Dyspepsia     Dyspnea     Fatigue     Gastritis     GERD (gastroesophageal reflux disease)     Hyperlipidemia     Hypertension     IgA nephropathy     Kidney infection     Microscopic hematuria     Migraine     Muscle spasm     Nausea     Pancreatitis     Prostate disease     RA (rheumatoid arthritis)     Serum lipase elevation     Vitamin D deficiency       Past Surgical History:   Procedure Laterality Date    BACK SURGERY  06/2022    Sp[facility] center in [place]    CARDIAC CATHETERIZATION      02/25/2022 PER DR. MEAD    CARDIAC CATHETERIZATION Left 02/25/2022    Procedure: Left Heart Cath;  Surgeon: Terrell Mead MD;  Location: [facility] CATH INVASIVE LOCATION;  Service: Cardiology;  Laterality: Left;   "progressive chest pain    CARDIAC ELECTROPHYSIOLOGY PROCEDURE N/A 07/30/2020    Procedure: Loop insertion;  Surgeon: Terrell Da Silva MD;  Location: [facility] INVASIVE LOCATION;  Service: Cardiovascular;  Laterality: N/A;    CERVICAL DISCECTOMY ANTERIOR  02/2017    C6-7 at [facility] in [place]    HAND SURGERY      LUMBAR DISCECTOMY      \"Spinal\" times 2 in 2003 and 2005    UMBILICAL HERNIA REPAIR      Dr. Diego Vasquez      Family History   Problem Relation Age of Onset    Hypertension Other     Other Other     Hypertension Mother     Heart disease Father     Hypertension Father     Diabetes Father     Arthritis Neg Hx     Hyperlipidemia Neg Hx       Social History     Socioeconomic History    Marital status:    Tobacco Use    Smoking status: Some Days     Current packs/day: 0.25     Types: Cigarettes     Passive exposure: Current    Smokeless tobacco: Never   Vaping Use    Vaping status: Never Used   Substance and Sexual Activity    Alcohol use: Never    Drug use: Never    Sexual activity: Defer     Review of Systems   Constitutional:  Positive for fatigue.   Musculoskeletal:  Positive for back pain.   Neurological:  Positive for memory problem.   All other systems reviewed and are negative.      Objective:    /86   Pulse 86   Ht 188 cm (74\")   Wt 96.2 kg (212 lb)   SpO2 98%   BMI 27.22 kg/m²     Neurology Exam:    General apperance: NAD.     Mental status: Alert, awake and oriented to time place and person.  Could tell me the month, year and his ZIP Code    Recent and Remote memory: Intact.  Delayed recall of 3/3 today as well    Attention span and Concentration: Normal.     Language and Speech: Intact- No dysarthria.    Fluency, Naming , Repitition and Comprehension:  Intact    Cranial Nerves:   CN II: Visual fields are full. Intact. Fundi - Normal, No papillederma, Pupils - ELISA  CN III, IV and VI: Extraocular movements are intact. Normal saccades.   CN V: Facial sensation is intact.   CN " VII: Muscles of facial expression reveal no asymmetry. Intact.   CN VIII: Hearing is intact. Whispered voice intact.   CN IX and X: Palate elevates symmetrically. Intact  CN XI: Shoulder shrug is intact.   CN XII: Tongue is midline without evidence of atrophy or fasciculation.       Motor:  5/5 in all extremities    Sensory- mildly reduced to light touch in right upper extremity    Co-ordination: Normal finger-to-nose, heel to shin B/L.    Rhomberg: Negative.    Gait: Normal    Assessment and Plan:  1. Sequelae, post-stroke  Patient had bilateral embolic infarcts which could be due to paroxysmal atrial fibrillation.  Thus far the loop recorder has not shown any episodes of A. Fib.  I explained to him that his mild memory problems may be due to his stroke.  He had bilateral embolic infarcts however the largest one was in the left parietal region which initially caused some aphasia.  currently on aspirin 81 mg and Eliquis 5 mg twice a day   He should continue Lipitor  40 mg daily for goal LDL of less than 100.  His last LDL was 50 and triglycerides were 198  Maintain blood pressure less than 130/90.  His blood pressure is well controlled    2.  Obstructive sleep apnea  I will give him a new sleep medicine referral as his last one was in 2021.  His sleep study then showed moderate ANI he needs a new machine    3.  Mild cognitive impairment  Most likely due to his stroke, untreated sleep apnea and his medications.   I have told him to increase his donepezil to 10 mg daily to see if that would help  Counseling given to carry out both physical and mental exercises such as reading, solving crossword puzzles etc.    4.Smoking  I have once again counseled him strongly to cut back on his smoking    Return in about 1 year (around 3/13/2025).     I spent 25 minutes out of which 20 minutes were spent face-to-face  Nani Carmona MD

## 2024-03-13 NOTE — TELEPHONE ENCOUNTER
Left my name and number for Mr Mckoy to return my call. His loop recorder has not connect recently and the battery may be at end of service.

## 2024-04-03 ENCOUNTER — HOSPITAL ENCOUNTER (OUTPATIENT)
Dept: HOSPITAL 22 - OT | Age: 54
Discharge: HOME | End: 2024-04-03
Payer: MEDICAID

## 2024-04-03 DIAGNOSIS — G89.29: ICD-10-CM

## 2024-04-03 DIAGNOSIS — M25.511: Primary | ICD-10-CM

## 2024-04-03 PROCEDURE — 97010 HOT OR COLD PACKS THERAPY: CPT

## 2024-04-03 PROCEDURE — 97140 MANUAL THERAPY 1/> REGIONS: CPT

## 2024-04-03 PROCEDURE — 97530 THERAPEUTIC ACTIVITIES: CPT

## 2024-04-03 PROCEDURE — 97164 PT RE-EVAL EST PLAN CARE: CPT

## 2024-04-03 PROCEDURE — 97166 OT EVAL MOD COMPLEX 45 MIN: CPT

## 2024-04-03 PROCEDURE — 97110 THERAPEUTIC EXERCISES: CPT

## 2024-04-03 RX ORDER — SOD SULF/POT CHLORIDE/MAG SULF 1.479 G
12 TABLET ORAL ONCE
Qty: 24 TABLET | Refills: 0 | Status: SHIPPED | OUTPATIENT
Start: 2024-04-03 | End: 2024-04-03

## 2024-04-04 ENCOUNTER — TELEPHONE (OUTPATIENT)
Dept: GASTROENTEROLOGY | Facility: CLINIC | Age: 54
End: 2024-04-04
Payer: COMMERCIAL

## 2024-04-04 RX ORDER — SODIUM, POTASSIUM,MAG SULFATES 17.5-3.13G
2 SOLUTION, RECONSTITUTED, ORAL ORAL TAKE AS DIRECTED
Qty: 354 ML | Refills: 0 | Status: SHIPPED | OUTPATIENT
Start: 2024-04-04

## 2024-04-04 NOTE — TELEPHONE ENCOUNTER
Hub staff attempted to follow warm transfer process and was unsuccessful     Caller: SHANNAN SEARS    Relationship to patient: SELF    Best call back number: [phone]    Patient is needing:INSURANCE WON'T PAY FOR BOWEL PREP TABLETS. CAN AN ALTERNATIVE BE CALLED IN THAT HIS INSURANCE WILL COVER? PROCEDURE IS MONDAY, 4.08

## 2024-04-05 ENCOUNTER — TELEPHONE (OUTPATIENT)
Dept: GASTROENTEROLOGY | Facility: CLINIC | Age: 54
End: 2024-04-05
Payer: COMMERCIAL

## 2024-04-05 NOTE — TELEPHONE ENCOUNTER
Provider: DR. PRECIADO    Caller: HAILEY SEARS    Relationship to Patient: WIFE    Pharmacy: [id number]    Phone Number: [phone]    Reason for Call: PT'S WIFE CALLED STATING THAT THE PT'S SUPREP REQUIRES A PRIOR AUTH// PT'S WIFE WOULD LIKE AN UPDATE ON PRIOR AUTH// PT'S WIFE WOULD LIKE TO KNOW IF THERE'S AND ALTERNATIVE MEDICATION THAT CAN BE SENT OVER, THAT INSURANCE WILL COVER// PLEASE CALL PT BACK// THEIR PHARMACY IS NOT OPEN ON THE WEEKEND

## 2024-04-08 ENCOUNTER — OUTSIDE FACILITY SERVICE (OUTPATIENT)
Dept: GASTROENTEROLOGY | Facility: CLINIC | Age: 54
End: 2024-04-08
Payer: COMMERCIAL

## 2024-04-08 PROCEDURE — 45385 COLONOSCOPY W/LESION REMOVAL: CPT | Performed by: INTERNAL MEDICINE

## 2024-04-08 RX ORDER — DONEPEZIL HYDROCHLORIDE 5 MG/1
5 TABLET, FILM COATED ORAL NIGHTLY
Qty: 30 TABLET | Refills: 11 | OUTPATIENT
Start: 2024-04-08 | End: 2025-04-08

## 2024-04-08 RX ORDER — ATORVASTATIN CALCIUM 40 MG/1
40 TABLET, FILM COATED ORAL DAILY
Qty: 30 TABLET | Refills: 11 | OUTPATIENT
Start: 2024-04-08

## 2024-04-08 NOTE — TELEPHONE ENCOUNTER
Rx Refill Note  Requested Prescriptions     Pending Prescriptions Disp Refills    donepezil (ARICEPT) 5 MG tablet [Pharmacy Med Name: DONEPEZIL 5MG TABLETS] 30 tablet 9     Sig: TAKE 1 TABLET BY MOUTH EVERY NIGHT    atorvastatin (LIPITOR) 40 MG tablet [Pharmacy Med Name: ATORVASTATIN 40MG TABLETS] 30 tablet 11     Sig: TAKE 1 TABLET BY MOUTH DAILY      Last filled:Atorvastatin  07/13/2023                  Donepezil 10mg 03/13/2024    Declined due to dose change(donepezil) and patient   Atorvastatin is to soon   Last office visit with prescribing clinician: 3/13/2024      Next office visit with prescribing clinician: 3/11/2025     Fina Limon MA  04/08/24, 11:47 EDT

## 2024-04-09 ENCOUNTER — TELEPHONE (OUTPATIENT)
Dept: GASTROENTEROLOGY | Facility: CLINIC | Age: 54
End: 2024-04-09

## 2024-05-29 ENCOUNTER — TELEPHONE (OUTPATIENT)
Dept: GASTROENTEROLOGY | Facility: CLINIC | Age: 54
End: 2024-05-29

## 2024-05-29 NOTE — TELEPHONE ENCOUNTER
"Caller: Lux Mckoy \"Dexter\"    Relationship to patient: Self    Best call back number: [phone]    Patient is needing: PATIENT NE3EDS TO CANCEL AND HIS WIFE WILL CALL BACK ONCE HE IS OVER HIS BACK ISSUE. PROCEDURE IS ON 7-1-24 WITH DR BUENROSTRO        "

## 2024-07-13 DIAGNOSIS — I69.30 SEQUELAE, POST-STROKE: ICD-10-CM

## 2024-07-15 NOTE — TELEPHONE ENCOUNTER
Rx Refill Note  Requested Prescriptions     Pending Prescriptions Disp Refills    Eliquis 5 MG tablet tablet [Pharmacy Med Name: ELIQUIS 5MG TABLETS] 60 tablet 3     Sig: TAKE 1 TABLET BY MOUTH EVERY 12 HOURS. CONTACT NEUROLOGY OFFICE TO. FOLLOW UP FOR FURTHER REFILLS      Last filled:  Last office visit with prescribing clinician: 3/13/2024      Next office visit with prescribing clinician: 3/11/2025     Fina Limon MA  07/15/24, 12:01 EDT

## 2024-11-01 RX ORDER — ATORVASTATIN CALCIUM 40 MG/1
40 TABLET, FILM COATED ORAL DAILY
Qty: 30 TABLET | Refills: 11 | Status: SHIPPED | OUTPATIENT
Start: 2024-11-01

## 2024-11-01 NOTE — TELEPHONE ENCOUNTER
Rx Refill Note  Requested Prescriptions     Pending Prescriptions Disp Refills    atorvastatin (LIPITOR) 40 MG tablet [Pharmacy Med Name: ATORVASTATIN 40MG TABLETS] 30 tablet 11     Sig: TAKE 1 TABLET BY MOUTH DAILY      Last filled:  Last office visit with prescribing clinician: 3/13/2024      Next office visit with prescribing clinician: 3/11/2025     Fina Limon MA  11/01/24, 15:39 EDT  Sent the Rx to patients pharmacy with 11 refills.

## 2025-01-13 NOTE — PROGRESS NOTES
[facility]  Follow Up Visit  Lux Mckoy  1970    VISIT DATE:  01/14/25    PCP:   Emani Alas PA  [address]  [place] [address]          CC:  Dizziness      Problem List:  Chest pain/shortness of breath:   Pulmonary evaluation.   Chronic pulmonary nodule, overall unchanged, followed by Dr. Mcknight and Gloria.   Echocardiogram 6/7/16: EF 55%, no sig valvular or structural disease  Stress test 6/7/2016: EF 63%, no ischemia on myocardial perfusion imaging.   PET Stress Test 3/2020:No evidence of ischemia, EF 63%  Cardiac cath February 2022: Normal coronary arteries, LVEDP 11  Embolic CVA  CT head 3/22/20: Abnormal low-attenuation region left frontoparietal posterior left MCA distribution involvement extending to involve the cortex with sulcal effacement concerning for acute or evolving infarction without intracranial hemorrhage  CTA head/neck 3/22/20: No large vessel occlusion including only minimal atherosclerotic involvement of the carotid bifurcations, however within the MCA distributions bilaterally, there are mild to moderate irregularities concerning for atherosclerotic involvement without distinct occlusion left greater than right of the M3 and M4 segments in particular.  CT perfusion with and without contrast 3/22/20: Reversible ischemia within the left posterior MCA distribution without evidence for core infarct component.  Echocardiogram 3/22/20: EF 60%, no sig valvular or structural disease. Negative bubble study.   Carotid duplex 3/22/20: no stenosis.   MRI brain 3/22/20: Abnormal restriction throughout the bilateral cerebral hemispheres. Multifocal appearance greatest region of fairly confluent restriction in the left temporoparietal region as seen on perfusion and prior imaging same day consistent with acute multifocal infarctions of concern for embolic etiology given bilateral and multifocal distribution. No midline shift or hydrocephalus.  CATY 3/24/2020:  EF 60%, negative saline study, normal valvular anatomy and function.   Atrial tachycardia  14 day monitor: No AFib, PACs <1%, Short episodes of atrial tachycardia  Loop recorder in place, no A. fib detected  Hypertension.   Hyperlipidemia  IgA nephropathy.   GERD.   Depression.   Migraines.   BPH.   Degenerative disease.   Osteoarthritis.   Salvador's esophagus.   Tobacco abuse.   Surgical Hx:   Remote back surgery x2.   Three right hand surgeries and 2 left hand surgery.        Echo 2024:    Left ventricular ejection fraction appears to be 56 - 60%.    Mild dilation of the aortic root is present.  Measures 4.5 cm    Mild mitral valve regurgitation is present.    Mild tricuspid valve regurgitation is present. Estimated right ventricular systolic pressure from tricuspid regurgitation is normal (<35 mmHg).      History of Present Illness:  Lux Mckoy  Is a 54 y.o. male with pertinent cardiac history detailed above.  Patient states his stepfather recently passed away and this has been stressful.  He is still smoking.  Also has reported increased chest pain.  Underwent a heart catheterization a couple of years ago showing normal coronaries.  Echo last year showed normal EF but he did have mild dilation of aortic root measuring 4.5 cm.  Lipids have been well-controlled.  BP is well-controlled.  Patient has a loop recorder in for his previous stroke.  This did not show any atrial fibrillation.  The battery is now .  He is interested in having this removed.  He has been on empiric Eliquis since time of stroke with no recurrent events.      Patient Active Problem List    Diagnosis Date Noted    Dupuytren's disease 2022    Shortness of breath 2022    Chest pain 2022     Note Last Updated: 2022     [facility] 22: Normal coronary arteries, LVEDP 11 mmHg.      ANI (obstructive sleep apnea) 2021    CVA (cerebral vascular accident)     Anxiety     Arthritis     ASVD  (arteriosclerotic vascular disease)     Benign prostate hyperplasia     Degenerative cervical disc     Degeneration of lumbar intervertebral disc     Dyspepsia     Fatigue     Gastritis     Hyperlipidemia     IgA nephropathy     Muscle spasm     Prostate disease     RA (rheumatoid arthritis)     B12 deficiency     Vitamin D deficiency     Confusion 07/29/2020    Expressive aphasia 03/25/2020    Elevated troponin 03/24/2020    Acute ischemic stroke 03/22/2020    Cerebrovascular atherosclerosis seen on imaging 03/22/2020    Tobacco use 03/22/2020    CKD (chronic kidney disease) stage 3, GFR 30-59 ml/min 03/22/2020    Microscopic hematuria 07/23/2019    Nausea 01/07/2019    Radicular pain 12/17/2018    Arthrodesis status 08/30/2018    Preoperative examination, unspecified 07/26/2018     Note Last Updated: 10/1/2024     REPLACING DXS THAT WERE INACTIVATED AFTER THE 10/01 REGULATORY UPDATE      Specimen positive for methicillin resistant Staphylococcus aureus (MRSA) 07/13/2018    Pseudoarthrosis of spine 09/11/2017    Abnormal levels of other serum enzymes 06/23/2017    Neck pain 12/23/2016    Spinal stenosis of cervical region 09/14/2016    Acute frontal sinusitis 08/05/2016    Allergic rhinitis 08/01/2016    Salvador esophagus 08/01/2016    Chronic pain 08/01/2016    Chronic prostatitis 08/01/2016    Depression 08/01/2016    Dyslipidemia 08/01/2016    GERD (gastroesophageal reflux disease) 08/01/2016    Hypertension 08/01/2016    Hypogonadism in male 08/01/2016    Neuropathy with IgA monoclonal gammopathy 08/01/2016    Migraine 08/01/2016    Neuropathy 08/01/2016    Pulmonary nodule 08/01/2016     Note Last Updated: 8/1/2016      · A.  Ct scan of 3/25/2016 reports a 11 mm noncalcified stellate nodule in the lateral      portion of the right lower lobe.B. stable on CT scans of the abdomen and pelvis at      [facility] between 1/2015 and 3/2016.      Vertigo 08/01/2016    Tinea versicolor 08/01/2016        Allergies   Allergen Reactions    Amoxicillin Unknown - Low Severity and Unknown (See Comments)    Augmentin [Amoxicillin-Pot Clavulanate] Itching    Bactrim [Sulfamethoxazole-Trimethoprim] Itching    Clavulanic Acid Unknown - Low Severity    Sulfamethoxazole Unknown - Low Severity    Trimethoprim Unknown - Low Severity       Social History     Socioeconomic History    Marital status:    Tobacco Use    Smoking status: Some Days     Current packs/day: 0.25     Types: Cigarettes     Passive exposure: Current    Smokeless tobacco: Never   Vaping Use    Vaping status: Never Used   Substance and Sexual Activity    Alcohol use: Never    Drug use: Never    Sexual activity: Defer       Family History   Problem Relation Age of Onset    Hypertension Other     Other Other     Hypertension Mother     Heart disease Father     Hypertension Father     Diabetes Father     Arthritis Neg Hx     Hyperlipidemia Neg Hx        Current Medications:    Current Outpatient Medications:     acetaminophen (TYLENOL) 325 MG tablet, Take 2 tablets by mouth Every 4 (Four) Hours As Needed for Mild Pain  or Headache. (Patient taking differently: Take 2 tablets by mouth Daily As Needed for Mild Pain or Headache.), Disp: , Rfl:     apixaban (Eliquis) 5 MG tablet tablet, Take 1 tablet by mouth Every 12 (Twelve) Hours. TAKE 1 TABLET BY MOUTH EVERY 12 HOURS., Disp: 60 tablet, Rfl: 3    aspirin 81 MG EC tablet, Take 1 tablet by mouth Daily., Disp: , Rfl:     atorvastatin (LIPITOR) 40 MG tablet, TAKE 1 TABLET BY MOUTH DAILY, Disp: 30 tablet, Rfl: 11    cetirizine (ZyrTEC) 10 MG tablet, Take 1 tablet by mouth Daily As Needed for allergies. (Patient taking differently: Take 1 tablet by mouth Daily.), Disp: 30 tablet, Rfl: 2    Cholecalciferol (Vitamin D3) 25 MCG (1000 UT) capsule, Take 1 capsule by mouth Daily., Disp: , Rfl:     donepezil (Aricept) 10 MG tablet, Take 1 tablet by mouth Every Night., Disp: 30 tablet, Rfl: 11    famotidine (PEPCID)  20 MG tablet, Take 0.5 tablets by mouth 2 (Two) Times a Day., Disp: , Rfl:     folic acid (FOLVITE) 1 MG tablet, Take 1 tablet by mouth Daily., Disp: , Rfl:     furosemide (LASIX) 20 MG tablet, Take 1 tablet by mouth Daily As Needed (edema)., Disp: 90 tablet, Rfl: 1    gabapentin (NEURONTIN) 400 MG capsule, Take 300 mg by mouth 3 (Three) Times a Day., Disp: , Rfl:     lisinopril (PRINIVIL,ZESTRIL) 2.5 MG tablet, Take 1 tablet by mouth Daily., Disp: , Rfl:     meclizine (ANTIVERT) 25 MG tablet, Take 1 tablet by mouth As Needed., Disp: , Rfl:     nicotine (Nicoderm CQ) 21 MG/24HR patch, Place 1 patch on the skin as directed by provider Daily. (Patient taking differently: Place 1 patch on the skin as directed by provider Daily. Has been off of this), Disp: 28 each, Rfl: 3    omega-3 acid ethyl esters (LOVAZA) 1 g capsule, Take 1 capsule by mouth Daily., Disp: , Rfl:     ondansetron (ZOFRAN) 8 MG tablet, Take 1 tablet by mouth Every 8 (Eight) Hours As Needed., Disp: , Rfl:     oxyCODONE-acetaminophen (PERCOCET)  MG per tablet, Take 1 tablet by mouth 3 (Three) Times a Day., Disp: , Rfl:     pantoprazole (PROTONIX) 40 MG EC tablet, Take 1 tablet by mouth Daily., Disp: , Rfl:     rizatriptan (MAXALT) 10 MG tablet, Take 1 tablet by mouth As Needed for Migraine. May repeat in 2 hours if needed, Disp: , Rfl:     DULoxetine (CYMBALTA) 60 MG capsule, Take 1 capsule by mouth Daily. (Patient not taking: Reported on 1/14/2025), Disp: , Rfl:     metoprolol tartrate (LOPRESSOR) 50 MG tablet, Take 50 mg at Bedtime the Night Before Coronary CTA Appointment and In the Morning 1 Hour Prior to Coronary CTA Appointment. Do not take if heart rate less than 60., Disp: 2 tablet, Rfl: 0    promethazine (PHENERGAN) 25 MG tablet, TAKE 1 TABLET BY MOUTH EVERY 4 TO 6 HOURS AS NEEDED FOR NAUSEA, Disp: , Rfl:     sodium-potassium-magnesium sulfates (Suprep Bowel Prep Kit) 17.5-3.13-1.6 GM/177ML solution oral solution, Take 2 bottles by mouth  "Take As Directed. Do not eat the day before your procedure. If you didn't receive instructions call [phone]. (Patient not taking: Reported on 1/14/2025), Disp: 354 mL, Rfl: 0     Review of Systems   Cardiovascular:  Positive for chest pain. Negative for irregular heartbeat, leg swelling, palpitations and syncope.   Psychiatric/Behavioral:  The patient is nervous/anxious.        Vitals:    01/14/25 1157   BP: 110/76   BP Location: Right arm   Patient Position: Sitting   Cuff Size: Adult   Pulse: 80   SpO2: 95%   Weight: 90.3 kg (199 lb)   Height: 188 cm (74\")       Physical Exam  Constitutional:       Appearance: Normal appearance.   Neck:      Vascular: No carotid bruit.   Cardiovascular:      Rate and Rhythm: Normal rate.      Pulses: Normal pulses.      Heart sounds: Normal heart sounds.   Pulmonary:      Effort: Pulmonary effort is normal.   Musculoskeletal:      Right lower leg: No edema.      Left lower leg: No edema.   Neurological:      General: No focal deficit present.      Mental Status: He is alert.         Diagnostic Data:  Procedures  Lab Results   Component Value Date    CHLPL 106 (L) 04/22/2024    TRIG 198 (H) 02/25/2022    HDL 34 (L) 04/22/2024     Lab Results   Component Value Date    GLUCOSE 97 02/25/2022    BUN 12 06/18/2022    CREATININE 1.39 (H) 06/18/2022     06/18/2022    K 3.9 06/18/2022     06/18/2022    CO2 25 06/18/2022     Lab Results   Component Value Date    HGBA1C 5.40 02/25/2022     Lab Results   Component Value Date    WBC 9.62 06/06/2024    HGB 14.4 06/08/2024    HCT 42.6 06/08/2024     06/06/2024       Assessment:   Diagnosis Plan   1. Chest pain, unspecified type  CT Angiogram Coronary    CT Angiogram Coronary-Cardiology Interpretation    metoprolol tartrate (LOPRESSOR) tablet 200 mg    metoprolol tartrate (LOPRESSOR) tablet 150 mg    metoprolol tartrate (LOPRESSOR) tablet 100 mg    metoprolol tartrate (LOPRESSOR) tablet 50 mg    metoprolol tartrate " (LOPRESSOR) tablet 25 mg    metoprolol tartrate (LOPRESSOR) tablet 50 mg    metoprolol tartrate (LOPRESSOR) injection 5 mg    nitroglycerin (NITROSTAT) SL tablet 0.4 mg    nitroglycerin (NITROSTAT) SL tablet 0.8 mg    ivabradine HCl (CORLANOR) tablet 15 mg    No Caffeine or Nicotine 4 Hours Prior to CTA Appointment    Nothing to Eat or Drink 4 Hours Prior to CTA Appointment    Do Not Take Phosphodiasterase Inhibitors in the 72 Hours Prior to Coronary CTA    Obtain Informed Consent - Computed Tomography Angiography of Chest - Angiogram of Coronary Arteries    Vital Signs Upon Arrival    Cardiac Monitoring    Verify NPO Status - Patient to Be NPO at Least 4 Hours Prior to CTA    Notify CT After Administration of metoprolol tartrate (LOPRESSOR) tablet    Notify Provider If Total Metoprolol Given Equals 300mg & Heart Rate Not At Goal    Notify Provider Prior to Administration of Nitroglycerin if Patient SBP <80    POC Creatinine    Insert Peripheral IV    Saline Lock & Maintain IV Access    sodium chloride 0.9 % flush 10 mL    sodium chloride 0.9 % flush 10 mL    sodium chloride 0.9 % infusion 40 mL    Vital Signs - See Instructions    Hold Medication Metformin (Glucophage, Glucophage XR, Fortament, Glumetza);  Metglip (metformin/glipizide);  Glucovance (metformin/glyburide); Avandamet (metformin/rosiglitazone)    Patient May Discharge Home After Procedure Complete (If Stable)    metoprolol tartrate (LOPRESSOR) 50 MG tablet          1.  Chest pain/aortic dilation  -Appears noncardiac, cardiac catheterization 2022 with normal coronary arteries  -Ascending aorta measured 4.5 cm on echo 2024  Will reeval-both conditions with coronary CTA     2..  Hypertension  -Within normal limits today     3.  CKD  -Last creatinine 1.57  -On low-dose lisinopril 2.5     4.  History of CVA, left fontal, parietal, right cerebellar  -Admitted 3/2020 with concern for embolic source  -CATY with no PFO  -Less than 50% carotid artery stenosis  bilaterally 2024  -Loop recorder did not show A-fib.  Battery .  Will arrange for loop recorder explant.  -He does continue on empiric Eliquis without recurrent stroke  -Last LDL 48  -Continue atorvastatin      5.  Tobacco use  -1/2 ppd smoker.  He does continue to smoke, advised benefits of cessation          ACP discussion was held with the patient during this visit. Patient has an advance directive in EMR which is still valid.  Patient does not have an advance directive, declines further assistance.      Joey Velazquez MD [facility]

## 2025-01-14 ENCOUNTER — OFFICE VISIT (OUTPATIENT)
Dept: CARDIOLOGY | Facility: CLINIC | Age: 55
End: 2025-01-14
Payer: COMMERCIAL

## 2025-01-14 VITALS
OXYGEN SATURATION: 95 % | HEIGHT: 74 IN | SYSTOLIC BLOOD PRESSURE: 110 MMHG | HEART RATE: 80 BPM | WEIGHT: 199 LBS | DIASTOLIC BLOOD PRESSURE: 76 MMHG | BODY MASS INDEX: 25.54 KG/M2

## 2025-01-14 DIAGNOSIS — R07.9 CHEST PAIN, UNSPECIFIED TYPE: Primary | ICD-10-CM

## 2025-01-14 DIAGNOSIS — I70.90 ATHEROSCLEROSIS: ICD-10-CM

## 2025-01-14 DIAGNOSIS — Z95.818 IMPLANTABLE LOOP RECORDER PRESENT: ICD-10-CM

## 2025-01-14 DIAGNOSIS — I10 PRIMARY HYPERTENSION: ICD-10-CM

## 2025-01-14 PROCEDURE — 3074F SYST BP LT 130 MM HG: CPT | Performed by: INTERNAL MEDICINE

## 2025-01-14 PROCEDURE — 99214 OFFICE O/P EST MOD 30 MIN: CPT | Performed by: INTERNAL MEDICINE

## 2025-01-14 PROCEDURE — 3078F DIAST BP <80 MM HG: CPT | Performed by: INTERNAL MEDICINE

## 2025-01-14 RX ORDER — METOPROLOL TARTRATE 25 MG/1
50 TABLET, FILM COATED ORAL ONCE
OUTPATIENT
Start: 2025-01-14

## 2025-01-14 RX ORDER — METOPROLOL TARTRATE 25 MG/1
150 TABLET, FILM COATED ORAL ONCE
OUTPATIENT
Start: 2025-01-14

## 2025-01-14 RX ORDER — METOPROLOL TARTRATE 50 MG
50 TABLET ORAL
OUTPATIENT
Start: 2025-01-14

## 2025-01-14 RX ORDER — IVABRADINE 5 MG/1
15 TABLET, FILM COATED ORAL ONCE
OUTPATIENT
Start: 2025-01-14 | End: 2025-01-14

## 2025-01-14 RX ORDER — METOPROLOL TARTRATE 25 MG/1
25 TABLET, FILM COATED ORAL ONCE
OUTPATIENT
Start: 2025-01-14

## 2025-01-14 RX ORDER — NITROGLYCERIN 0.4 MG/1
0.8 TABLET SUBLINGUAL
OUTPATIENT
Start: 2025-01-14

## 2025-01-14 RX ORDER — NITROGLYCERIN 0.4 MG/1
0.4 TABLET SUBLINGUAL
OUTPATIENT
Start: 2025-01-14 | End: 2025-01-14

## 2025-01-14 RX ORDER — METOPROLOL TARTRATE 1 MG/ML
5 INJECTION, SOLUTION INTRAVENOUS
OUTPATIENT
Start: 2025-01-14

## 2025-01-14 RX ORDER — METOPROLOL TARTRATE 25 MG/1
200 TABLET, FILM COATED ORAL ONCE
OUTPATIENT
Start: 2025-01-14 | End: 2025-01-14

## 2025-01-14 RX ORDER — SODIUM CHLORIDE 9 MG/ML
40 INJECTION, SOLUTION INTRAVENOUS AS NEEDED
OUTPATIENT
Start: 2025-01-14

## 2025-01-14 RX ORDER — METOPROLOL TARTRATE 25 MG/1
100 TABLET, FILM COATED ORAL ONCE
OUTPATIENT
Start: 2025-01-14

## 2025-01-14 RX ORDER — SODIUM CHLORIDE 0.9 % (FLUSH) 0.9 %
10 SYRINGE (ML) INJECTION AS NEEDED
OUTPATIENT
Start: 2025-01-14

## 2025-01-14 RX ORDER — SODIUM CHLORIDE 0.9 % (FLUSH) 0.9 %
10 SYRINGE (ML) INJECTION EVERY 12 HOURS SCHEDULED
OUTPATIENT
Start: 2025-01-14

## 2025-01-14 RX ORDER — METOPROLOL TARTRATE 50 MG
TABLET ORAL
Qty: 2 TABLET | Refills: 0 | Status: SHIPPED | OUTPATIENT
Start: 2025-01-14

## 2025-02-04 DIAGNOSIS — I69.30 SEQUELAE, POST-STROKE: ICD-10-CM

## 2025-02-04 RX ORDER — APIXABAN 5 MG/1
5 TABLET, FILM COATED ORAL
Qty: 60 TABLET | Refills: 1 | Status: SHIPPED | OUTPATIENT
Start: 2025-02-04

## 2025-02-04 NOTE — TELEPHONE ENCOUNTER
Rx Refill Note  Requested Prescriptions     Pending Prescriptions Disp Refills    Eliquis 5 MG tablet tablet [Pharmacy Med Name: ELIQUIS 5MG TABLETS] 60 tablet 3     Sig: TAKE 1 TABLET BY MOUTH EVERY 12 HOURS      Last filled: 7/15/24 60 with 3 refills.  Last office visit with prescribing clinician: 3/13/2024      Next office visit with prescribing clinician: 3/11/2025     Sent in 60 with 1 refill.    Monserrat Rust MA  02/04/25, 08:48 EST

## 2025-02-05 ENCOUNTER — TRANSCRIBE ORDERS (OUTPATIENT)
Dept: CARDIOLOGY | Facility: CLINIC | Age: 55
End: 2025-02-05
Payer: COMMERCIAL

## 2025-02-05 DIAGNOSIS — Z95.818 IMPLANTABLE LOOP RECORDER PRESENT: Primary | ICD-10-CM

## 2025-02-14 ENCOUNTER — PREP FOR SURGERY (OUTPATIENT)
Dept: OTHER | Facility: HOSPITAL | Age: 55
End: 2025-02-14
Payer: COMMERCIAL

## 2025-02-14 RX ORDER — SODIUM CHLORIDE 0.9 % (FLUSH) 0.9 %
10 SYRINGE (ML) INJECTION AS NEEDED
OUTPATIENT
Start: 2025-02-14

## 2025-02-14 RX ORDER — CEFAZOLIN SODIUM 2 G/100ML
2000 INJECTION, SOLUTION INTRAVENOUS ONCE
OUTPATIENT
Start: 2025-02-14 | End: 2025-02-14

## 2025-02-14 RX ORDER — SODIUM CHLORIDE 0.9 % (FLUSH) 0.9 %
10 SYRINGE (ML) INJECTION EVERY 12 HOURS SCHEDULED
OUTPATIENT
Start: 2025-02-14

## 2025-02-14 RX ORDER — SODIUM CHLORIDE 9 MG/ML
40 INJECTION, SOLUTION INTRAVENOUS AS NEEDED
OUTPATIENT
Start: 2025-02-14

## 2025-02-21 ENCOUNTER — HOSPITAL ENCOUNTER (OUTPATIENT)
Facility: HOSPITAL | Age: 55
Setting detail: HOSPITAL OUTPATIENT SURGERY
Discharge: HOME | End: 2025-02-21
Attending: INTERNAL MEDICINE | Admitting: INTERNAL MEDICINE
Payer: COMMERCIAL

## 2025-02-21 VITALS
OXYGEN SATURATION: 100 % | BODY MASS INDEX: 25.32 KG/M2 | TEMPERATURE: 98.2 F | SYSTOLIC BLOOD PRESSURE: 150 MMHG | WEIGHT: 197.31 LBS | RESPIRATION RATE: 14 BRPM | HEIGHT: 74 IN | HEART RATE: 70 BPM | DIASTOLIC BLOOD PRESSURE: 97 MMHG

## 2025-02-21 DIAGNOSIS — Z95.818 IMPLANTABLE LOOP RECORDER PRESENT: ICD-10-CM

## 2025-02-21 LAB
ANION GAP SERPL CALCULATED.3IONS-SCNC: 12 MMOL/L (ref 5–15)
BUN SERPL-MCNC: 23 MG/DL (ref 6–20)
BUN/CREAT SERPL: 18.4 (ref 7–25)
CALCIUM SPEC-SCNC: 9.2 MG/DL (ref 8.6–10.5)
CHLORIDE SERPL-SCNC: 106 MMOL/L (ref 98–107)
CO2 SERPL-SCNC: 22 MMOL/L (ref 22–29)
CREAT SERPL-MCNC: 1.25 MG/DL (ref 0.76–1.27)
DEPRECATED RDW RBC AUTO: 41.1 FL (ref 37–54)
EGFRCR SERPLBLD CKD-EPI 2021: 68.9 ML/MIN/1.73
ERYTHROCYTE [DISTWIDTH] IN BLOOD BY AUTOMATED COUNT: 12.8 % (ref 12.3–15.4)
GLUCOSE SERPL-MCNC: 91 MG/DL (ref 65–99)
HCT VFR BLD AUTO: 45.5 % (ref 37.5–51)
HGB BLD-MCNC: 15.5 G/DL (ref 13–17.7)
MCH RBC QN AUTO: 29.8 PG (ref 26.6–33)
MCHC RBC AUTO-ENTMCNC: 34.1 G/DL (ref 31.5–35.7)
MCV RBC AUTO: 87.5 FL (ref 79–97)
PLATELET # BLD AUTO: 233 10*3/MM3 (ref 140–450)
PMV BLD AUTO: 10.3 FL (ref 6–12)
POTASSIUM SERPL-SCNC: 4.5 MMOL/L (ref 3.5–5.2)
RBC # BLD AUTO: 5.2 10*6/MM3 (ref 4.14–5.8)
SODIUM SERPL-SCNC: 140 MMOL/L (ref 136–145)
WBC NRBC COR # BLD AUTO: 8.17 10*3/MM3 (ref 3.4–10.8)

## 2025-02-21 PROCEDURE — 25010000002 LIDOCAINE PF 1% 1 % SOLUTION: Performed by: INTERNAL MEDICINE

## 2025-02-21 PROCEDURE — 36415 COLL VENOUS BLD VENIPUNCTURE: CPT

## 2025-02-21 PROCEDURE — 85027 COMPLETE CBC AUTOMATED: CPT | Performed by: PHYSICIAN ASSISTANT

## 2025-02-21 PROCEDURE — 25010000002 MIDAZOLAM PER 1 MG: Performed by: INTERNAL MEDICINE

## 2025-02-21 PROCEDURE — 25010000002 VANCOMYCIN 1.75-0.9 GM/500ML-% SOLUTION: Performed by: PHYSICIAN ASSISTANT

## 2025-02-21 PROCEDURE — 25010000002 FENTANYL CITRATE (PF) 50 MCG/ML SOLUTION: Performed by: INTERNAL MEDICINE

## 2025-02-21 PROCEDURE — 80048 BASIC METABOLIC PNL TOTAL CA: CPT | Performed by: PHYSICIAN ASSISTANT

## 2025-02-21 PROCEDURE — 33286 RMVL SUBQ CAR RHYTHM MNTR: CPT | Performed by: INTERNAL MEDICINE

## 2025-02-21 RX ORDER — SODIUM CHLORIDE 0.9 % (FLUSH) 0.9 %
10 SYRINGE (ML) INJECTION EVERY 12 HOURS SCHEDULED
Status: DISCONTINUED | OUTPATIENT
Start: 2025-02-21 | End: 2025-02-21 | Stop reason: HOSPADM

## 2025-02-21 RX ORDER — MIDAZOLAM HYDROCHLORIDE 1 MG/ML
INJECTION, SOLUTION INTRAMUSCULAR; INTRAVENOUS
Status: DISCONTINUED | OUTPATIENT
Start: 2025-02-21 | End: 2025-02-21 | Stop reason: HOSPADM

## 2025-02-21 RX ORDER — LIDOCAINE HYDROCHLORIDE 10 MG/ML
INJECTION, SOLUTION EPIDURAL; INFILTRATION; INTRACAUDAL; PERINEURAL
Status: DISCONTINUED | OUTPATIENT
Start: 2025-02-21 | End: 2025-02-21 | Stop reason: HOSPADM

## 2025-02-21 RX ORDER — SODIUM CHLORIDE 9 MG/ML
40 INJECTION, SOLUTION INTRAVENOUS AS NEEDED
Status: DISCONTINUED | OUTPATIENT
Start: 2025-02-21 | End: 2025-02-21 | Stop reason: HOSPADM

## 2025-02-21 RX ORDER — FENTANYL CITRATE 50 UG/ML
INJECTION, SOLUTION INTRAMUSCULAR; INTRAVENOUS
Status: DISCONTINUED | OUTPATIENT
Start: 2025-02-21 | End: 2025-02-21 | Stop reason: HOSPADM

## 2025-02-21 RX ORDER — VANCOMYCIN 1.75 GRAM/500 ML IN 0.9 % SODIUM CHLORIDE INTRAVENOUS
20 ONCE
Status: COMPLETED | OUTPATIENT
Start: 2025-02-21 | End: 2025-02-21

## 2025-02-21 RX ORDER — SODIUM CHLORIDE 0.9 % (FLUSH) 0.9 %
10 SYRINGE (ML) INJECTION AS NEEDED
Status: DISCONTINUED | OUTPATIENT
Start: 2025-02-21 | End: 2025-02-21 | Stop reason: HOSPADM

## 2025-02-21 RX ADMIN — Medication 1750 MG: at 13:39

## 2025-02-21 NOTE — H&P
Pre-procedure History and Physical  [facility]      Patient:  Lux Mckoy  :  1970  MRN: [id number]    PCP:  Emani Alas PA  PHONE:  [phone]    DATE: 2025  ID: Lux Mckoy is a 54 y.o. male resident of [place], [place]     CC: Loop monitor at EOL    PROBLEM LIST:   Chest pain/shortness of breath:   Pulmonary evaluation.   Chronic pulmonary nodule, overall unchanged, followed by Dr. Mcknight and Gloria.   Echocardiogram 16: EF 55%, no sig valvular or structural disease  Stress test 2016: EF 63%, no ischemia on myocardial perfusion imaging.   PET Stress Test 3/2020:No evidence of ischemia, EF 63%  Cardiac cath 2022: Normal coronary arteries, LVEDP 11  Embolic CVA  CT head 3/22/20: Abnormal low-attenuation region left frontoparietal posterior left MCA distribution involvement extending to involve the cortex with sulcal effacement concerning for acute or evolving infarction without intracranial hemorrhage  CTA head/neck 3/22/20: No large vessel occlusion including only minimal atherosclerotic involvement of the carotid bifurcations, however within the MCA distributions bilaterally, there are mild to moderate irregularities concerning for atherosclerotic involvement without distinct occlusion left greater than right of the M3 and M4 segments in particular.  CT perfusion with and without contrast 3/22/20: Reversible ischemia within the left posterior MCA distribution without evidence for core infarct component.  Echocardiogram 3/22/20: EF 60%, no sig valvular or structural disease. Negative bubble study.   Carotid duplex 3/22/20: no stenosis.   MRI brain 3/22/20: Abnormal restriction throughout the bilateral cerebral hemispheres. Multifocal appearance greatest region of fairly confluent restriction in the left temporoparietal region as seen on perfusion and prior imaging same day consistent with acute multifocal infarctions of  concern for embolic etiology given bilateral and multifocal distribution. No midline shift or hydrocephalus.  CATY 3/24/2020: EF 60%, negative saline study, normal valvular anatomy and function.   Atrial tachycardia  14 day monitor: No AFib, PACs <1%, Short episodes of atrial tachycardia  Loop recorder in place, no A. fib detected  Hypertension.   Hyperlipidemia  IgA nephropathy.   GERD.   Depression.   Migraines.   BPH.   Degenerative disease.   Osteoarthritis.   Salvador's esophagus.   Tobacco abuse.   Surgical Hx:   Remote back surgery x2.   Three right hand surgeries and 2 left hand surgery.       BRIEF HPI: Mr. Mckoy is a 55 y/o male with above noted history who has been referred for loop monitor extraction today. He has been followed by Dr. Velazquez for history of CVA with loop monitor in place. This has not demonstrated Afib, and the battery is now . He has been anticoagulated with Eliquis empirically, with no recurrent CVA.       Allergies:      Allergies   Allergen Reactions    Amoxicillin Unknown - Low Severity and Unknown (See Comments)    Augmentin [Amoxicillin-Pot Clavulanate] Itching    Bactrim [Sulfamethoxazole-Trimethoprim] Itching    Clavulanic Acid Unknown - Low Severity    Sulfamethoxazole Unknown - Low Severity    Trimethoprim Unknown - Low Severity       MEDICATIONS:  Current Outpatient Medications   Medication Instructions    acetaminophen (TYLENOL) 650 mg, Oral, Every 4 Hours PRN    aspirin 81 mg, Daily    atorvastatin (LIPITOR) 40 mg, Oral, Daily    cetirizine (ZYRTEC) 10 mg, Oral, Daily PRN    donepezil (ARICEPT) 10 mg, Oral, Nightly    DULoxetine (CYMBALTA) 60 mg, Daily    Eliquis 5 mg, Oral    famotidine (PEPCID) 10 mg, 2 Times Daily    folic acid (FOLVITE) 1 mg, Oral, Daily    furosemide (LASIX) 20 mg, Oral, Daily PRN    gabapentin (NEURONTIN) 300 mg, 3 Times Daily    lisinopril (PRINIVIL,ZESTRIL) 2.5 MG tablet 1 tablet, Daily    meclizine (ANTIVERT) 25 mg, As Needed    metoprolol  "tartrate (LOPRESSOR) 50 MG tablet Take 50 mg at Bedtime the Night Before Coronary CTA Appointment and In the Morning 1 Hour Prior to Coronary CTA Appointment. Do not take if heart rate less than 60.    nicotine (Nicoderm CQ) 21 MG/24HR patch 1 patch, Transdermal, Every 24 Hours    omega-3 acid ethyl esters (LOVAZA) 1 g capsule 1 capsule, Daily    ondansetron (ZOFRAN) 8 mg, Every 8 Hours PRN    oxyCODONE-acetaminophen (PERCOCET)  MG per tablet 1 tablet, 3 Times Daily    pantoprazole (PROTONIX) 40 mg, Daily    promethazine (PHENERGAN) 25 MG tablet TAKE 1 TABLET BY MOUTH EVERY 4 TO 6 HOURS AS NEEDED FOR NAUSEA    rizatriptan (MAXALT) 10 mg, As Needed    sodium-potassium-magnesium sulfates (Suprep Bowel Prep Kit) 17.5-3.13-1.6 GM/177ML solution oral solution 2 bottles, Oral, Take As Directed, Do not eat the day before your procedure. If you didn't receive instructions call [phone].    Vitamin D3 1,000 Units, Daily       Past medical & surgical history, social and family history reviewed in the electronic medical record.    ROS: Pertinent positives listed in the HPI and problem list above. All others reviewed and negative.     Physical Exam:   /100 (BP Location: Left arm)   Pulse 73   Ht 188 cm (74\")   Wt 89.5 kg (197 lb 5 oz)   BMI 25.33 kg/m²     Constitutional:    Alert, cooperative, in no acute distress   Neck:     No Jugular venous distention, adenopathy, or thyromegaly noted.    Heart:    Regular rhythm and normal rate, normal S1 and S2, no murmurs,gallops, rubs, or clicks. No distinct PMI noted.    Lungs:     Clear to auscultation bilaterally, respirations regular, even and unlabored    Extremities:   No gross deformities, no edema, clubbing, or cyanosis.        Labs and Diagnostic Data:          Lab Results   Component Value Date    CHOL 113 02/25/2022    TRIG 198 (H) 02/25/2022    HDL 34 (L) 04/22/2024    LDL 48.6 04/22/2024    AST 32 06/10/2022    ALT 38 06/10/2022               "       IMPRESSION:  53 y/o male with history of CVA, HTN, HLD, tobacco abuse. He has had a loop monitor in place which is now at end of life. He wished to have this removed and presents in this regard.     PLAN:  Procedure to perform: Loop explant. Risks, benefits and alternatives to the procedure explained to the patient and he understands and wishes to proceed.       Electronically signed by Gosia Aguirre PA-C, 02/21/25, 10:34 AM EST.    All risk benefits and alternatives of the procedure and precautions to take after the procedure were discussed with the patient and his family member at bedside they are in agreement to proceed.    Terrell Da Silva MD, [facility], [facility]  Interventional Cardiology  02/21/25  13:19 EST

## 2025-02-24 ENCOUNTER — HOSPITAL ENCOUNTER (OUTPATIENT)
Facility: HOSPITAL | Age: 55
Discharge: HOME | End: 2025-02-24
Payer: COMMERCIAL

## 2025-02-24 VITALS
DIASTOLIC BLOOD PRESSURE: 66 MMHG | HEART RATE: 60 BPM | RESPIRATION RATE: 10 BRPM | BODY MASS INDEX: 25.76 KG/M2 | WEIGHT: 200.73 LBS | HEIGHT: 74 IN | TEMPERATURE: 98.2 F | SYSTOLIC BLOOD PRESSURE: 109 MMHG

## 2025-02-24 DIAGNOSIS — R07.9 CHEST PAIN, UNSPECIFIED TYPE: ICD-10-CM

## 2025-02-24 PROCEDURE — 75574 CT ANGIO HRT W/3D IMAGE: CPT | Performed by: INTERNAL MEDICINE

## 2025-02-24 PROCEDURE — 25510000001 IOPAMIDOL PER 1 ML: Performed by: INTERNAL MEDICINE

## 2025-02-24 PROCEDURE — 75574 CT ANGIO HRT W/3D IMAGE: CPT

## 2025-02-24 RX ORDER — METOPROLOL TARTRATE 100 MG/1
200 TABLET ORAL ONCE
Status: DISCONTINUED | OUTPATIENT
Start: 2025-02-24 | End: 2025-02-25 | Stop reason: HOSPADM

## 2025-02-24 RX ORDER — SODIUM CHLORIDE 0.9 % (FLUSH) 0.9 %
10 SYRINGE (ML) INJECTION AS NEEDED
Status: DISCONTINUED | OUTPATIENT
Start: 2025-02-24 | End: 2025-02-25 | Stop reason: HOSPADM

## 2025-02-24 RX ORDER — IOPAMIDOL 755 MG/ML
100 INJECTION, SOLUTION INTRAVASCULAR
Status: COMPLETED | OUTPATIENT
Start: 2025-02-24 | End: 2025-02-24

## 2025-02-24 RX ORDER — METOPROLOL TARTRATE 25 MG/1
25 TABLET, FILM COATED ORAL ONCE
Status: DISCONTINUED | OUTPATIENT
Start: 2025-02-24 | End: 2025-02-25 | Stop reason: HOSPADM

## 2025-02-24 RX ORDER — NITROGLYCERIN 0.4 MG/1
0.8 TABLET SUBLINGUAL
Status: COMPLETED | OUTPATIENT
Start: 2025-02-24 | End: 2025-02-24

## 2025-02-24 RX ORDER — SODIUM CHLORIDE 0.9 % (FLUSH) 0.9 %
10 SYRINGE (ML) INJECTION EVERY 12 HOURS SCHEDULED
Status: DISCONTINUED | OUTPATIENT
Start: 2025-02-24 | End: 2025-02-25 | Stop reason: HOSPADM

## 2025-02-24 RX ORDER — METOPROLOL TARTRATE 25 MG/1
50 TABLET, FILM COATED ORAL ONCE
Status: DISCONTINUED | OUTPATIENT
Start: 2025-02-24 | End: 2025-02-25 | Stop reason: HOSPADM

## 2025-02-24 RX ORDER — METOPROLOL TARTRATE 100 MG/1
100 TABLET ORAL ONCE
Status: DISCONTINUED | OUTPATIENT
Start: 2025-02-24 | End: 2025-02-25 | Stop reason: HOSPADM

## 2025-02-24 RX ORDER — METOPROLOL TARTRATE 1 MG/ML
5 INJECTION, SOLUTION INTRAVENOUS
Status: DISCONTINUED | OUTPATIENT
Start: 2025-02-24 | End: 2025-02-25 | Stop reason: HOSPADM

## 2025-02-24 RX ORDER — METOPROLOL TARTRATE 25 MG/1
50 TABLET, FILM COATED ORAL
Status: DISCONTINUED | OUTPATIENT
Start: 2025-02-24 | End: 2025-02-25 | Stop reason: HOSPADM

## 2025-02-24 RX ORDER — NITROGLYCERIN 0.4 MG/1
0.4 TABLET SUBLINGUAL
Status: COMPLETED | OUTPATIENT
Start: 2025-02-24 | End: 2025-02-24

## 2025-02-24 RX ORDER — IVABRADINE 5 MG/1
15 TABLET, FILM COATED ORAL ONCE
Status: DISCONTINUED | OUTPATIENT
Start: 2025-02-24 | End: 2025-02-25 | Stop reason: HOSPADM

## 2025-02-24 RX ORDER — SODIUM CHLORIDE 9 MG/ML
40 INJECTION, SOLUTION INTRAVENOUS AS NEEDED
Status: DISCONTINUED | OUTPATIENT
Start: 2025-02-24 | End: 2025-02-25 | Stop reason: HOSPADM

## 2025-02-24 RX ADMIN — IOPAMIDOL 65 ML: 755 INJECTION, SOLUTION INTRAVENOUS at 15:25

## 2025-02-24 RX ADMIN — NITROGLYCERIN 0.8 MG: 0.4 TABLET SUBLINGUAL at 15:04

## 2025-02-25 ENCOUNTER — TELEPHONE (OUTPATIENT)
Dept: CARDIOLOGY | Facility: CLINIC | Age: 55
End: 2025-02-25
Payer: COMMERCIAL

## 2025-02-25 NOTE — TELEPHONE ENCOUNTER
"    Name: EanLux Dexter \"Dexter\"    Relationship: Self    Best Callback Number: [phone]    Incoming call to the Hub, requesting to  Reschedule their Wound Check appointment on 2.28.25.     Per Hub workflow, further review is needed before the task can be completed.    Result of Call: Please reach out to the patient to reschedule      "

## 2025-02-27 ENCOUNTER — OFFICE VISIT (OUTPATIENT)
Dept: CARDIOLOGY | Facility: CLINIC | Age: 55
End: 2025-02-27
Payer: COMMERCIAL

## 2025-02-27 ENCOUNTER — TELEPHONE (OUTPATIENT)
Dept: CARDIOLOGY | Facility: CLINIC | Age: 55
End: 2025-02-27
Payer: COMMERCIAL

## 2025-02-27 DIAGNOSIS — Z95.818 IMPLANTABLE LOOP RECORDER PRESENT: Primary | ICD-10-CM

## 2025-02-27 RX ORDER — CEPHALEXIN 500 MG/1
500 CAPSULE ORAL 2 TIMES DAILY
Qty: 14 CAPSULE | Refills: 0 | Status: SHIPPED | OUTPATIENT
Start: 2025-02-27

## 2025-02-27 NOTE — PROGRESS NOTES
2025    Lux Mckoy, : 1970      Fever: No    Temperature if indicated:     Wound Location: Left Infraclavicular    Dressing Removed: Removed by MA/RN      Old Dressing Appearance:  Old, bloody drainage    Wound Appearance: Redness []                  Drainage [x]                  Culture obtained []        Color: Yellow     Consistency: Thin     Amount: mild         Gloves used, wound cleansed with sterile 4x4 and peroxide [x]       MD notified []     MD orders:     Antibiotic started []      If checked, type     Other:       Appointment for follow-up scheduled for 3 months post procedure []    Patient does not have a post-op 3 month follow-up scheduled until 2026. I advised him to contact our office to schedule one.     Future Appointments   Date Time Provider Department Center   3/11/2025  1:15 PM Nani Carmona MD [facility]   3/31/2026 11:45 AM Joey Velazquez MD [facility]           Tejal Jay MA, 25      MD Signature:______________________________ Completed By/Date:

## 2025-02-27 NOTE — TELEPHONE ENCOUNTER
Patient in for wound check today.     Called patient after visit- wife answered (on verbal) and informed her of Dr. Velazquez's recommendation for Keflex 500 mg twice daily for 1 week- total of 14 tablets. Informed her Dr. Velazquez has already sent this to patient's pharmacy. Patient's wife verbalizes understanding and states she will let the patient know. No further questions.

## 2025-03-11 ENCOUNTER — OFFICE VISIT (OUTPATIENT)
Dept: NEUROLOGY | Facility: CLINIC | Age: 55
End: 2025-03-11
Payer: COMMERCIAL

## 2025-03-11 VITALS
OXYGEN SATURATION: 94 % | WEIGHT: 200 LBS | SYSTOLIC BLOOD PRESSURE: 124 MMHG | BODY MASS INDEX: 25.67 KG/M2 | DIASTOLIC BLOOD PRESSURE: 84 MMHG | HEART RATE: 91 BPM | HEIGHT: 74 IN

## 2025-03-11 DIAGNOSIS — I69.30 SEQUELAE, POST-STROKE: Primary | ICD-10-CM

## 2025-03-11 DIAGNOSIS — F17.200 SMOKER: ICD-10-CM

## 2025-03-11 DIAGNOSIS — G47.33 OBSTRUCTIVE SLEEP APNEA: ICD-10-CM

## 2025-03-11 DIAGNOSIS — G31.84 MILD COGNITIVE IMPAIRMENT: ICD-10-CM

## 2025-03-11 PROCEDURE — 3074F SYST BP LT 130 MM HG: CPT | Performed by: PSYCHIATRY & NEUROLOGY

## 2025-03-11 PROCEDURE — 99214 OFFICE O/P EST MOD 30 MIN: CPT | Performed by: PSYCHIATRY & NEUROLOGY

## 2025-03-11 PROCEDURE — 3079F DIAST BP 80-89 MM HG: CPT | Performed by: PSYCHIATRY & NEUROLOGY

## 2025-03-11 PROCEDURE — 1159F MED LIST DOCD IN RCRD: CPT | Performed by: PSYCHIATRY & NEUROLOGY

## 2025-03-11 PROCEDURE — 1160F RVW MEDS BY RX/DR IN RCRD: CPT | Performed by: PSYCHIATRY & NEUROLOGY

## 2025-03-11 RX ORDER — ASPIRIN 81 MG/1
1 TABLET ORAL DAILY
COMMUNITY
Start: 2025-02-10

## 2025-03-11 NOTE — PROGRESS NOTES
Subjective:    CC: Lux Mckoy is seen today  for stroke    HPI:  Current visit-patient has not had any more strokelike symptoms.  His loop recorder was taken out a few weeks ago.  He continues to take both aspirin 81 mg and Eliquis along with Lipitor 40 mg daily.  His last lipid panel was as follows-, , LDL 43, HDL 40.  He also had a CT coronaries recently that showed a calcium score of 0.  He continues to have short-term memory problems despite increasing his donepezil to 10 mg daily which has not helped.  Has still not scheduled his sleep medicine appointment to get a new CPAP as he was very busy after the death of his stepfather around [facility] last year.  In fact he has also increased his smoking to half-3/4 pack a day due to stress.  For his low back pain which has flared up recently as he has been helping his mother after his father's death, he continues to take gabapentin and Percocet.    Last visit-patient denies any new strokelike symptoms but continues to have short-term memory problems marked by forgetfulness despite starting donepezil 5 mg daily.  His loop recorder has failed to show any atrial fibrillation till date.  He was also having dizziness therefore cardiology ordered an echocardiogram for him that showed an EF of 55 to 60% with mild mitral and tricuspid valve regurg and a carotid Doppler that showed minimal stenosis bilaterally.  He has continued to take aspirin along with Eliquis and Lipitor.  He is still not using his CPAP since it was recalled and also continues to smoke.  Takes gabapentin and oxycodone as he has had multiple back surgeries.  Keeps busy by working around the house and with his chickens at home.    Last visit-patient denies having any strokelike symptoms since he last saw me in October 2021.  His loop recorder has not shown any A-fib till date.  He continues to take aspirin along with Eliquis and Lipitor 40 mg daily.  His last lipid panel was as  "follows-, , LDL 50, HDL 38.  GFR was 53 and A1c was normal.  He continues to have short-term memory problems marked by forgetfulness.  Did not go for speech therapy as he could not afford \"money for gas \".  He still feels tired and lethargic during the day.  Is not very compliant with his CPAP.      Last visit-patient states that he continues to have memory problems where he has difficulty recalling names and words.  He also still has fatigue.  Denies any more episodes of confusion, weakness or numbness.  Continues to take Eliquis in addition to aspirin and Lipitor 40 mg.  He had his sleep study that showed moderate obstructive sleep apnea for which he was given a CPAP however it was recalled recently due to a faulty filter but he was told that he could start using it again as it was a minor fall.  Patient also had repeat testosterone levels that were on the lower side of normal (in the 200s).  He continues to smoke about half a pack of cigarettes daily.  Has lost some weight recently.    Last visit-patient denies having any new strokelike symptoms since his last visit but he continues to be extremely tired during the day.  His wife also feels that he has short-term memory problems ever since he had the stroke.  Patient does report to snoring at night.  Continues to take aspirin 81 mg daily along with Eliquis and Lipitor 40 mg daily.  Thus far the loop recorder has not shown any evidence of atrial fibrillation.  He has stopped taking Depakote and has reduced his dose of gabapentin 400 mg to 1 to 2 tablets at night.  Also takes opiates for his back pain and Zoloft for his mood.    Last visit-since the last visit patient had symptoms of bilateral hand tremors with more memory problems in July.  His wife took him to the hospital where he had a MRI scan of the brain which showed chronic bilateral infarcts but no acute abnormalities.  At the hospital patient was started on Eliquis 5 mg twice a day and " cardiology also put him a loop recorder.  Prior to that his Holter monitor showed atrial tachycardia but no A. fib.  He was supposed to follow-up with outpatient cardiology but then all of these symptoms occurred.  He also continues to take aspirin 325 mg daily and Lipitor 80 mg daily.  His last lipid panel was as follows-, , LDL 35, HDL 28.  A1c was 5.  At the hospital he was also started on Depakote  mg 3 times a day for the pain in his legs and mood changes however patient states that his mood is fine and the Depakote does not help with the pain.  He is on gabapentin 300 mg twice a day and his pain physician will increase it to 400 mg 3 times daily soon for his chronic low back pain.  Patient has completely quit smoking now.   Of note-I personally reviewed his MRI scan of the brain and Dr. Davison's notes from the hospital.     Initial ziyct-37-oqhy-old male with a history of chronic smoking, CKD (secondary to IgA nephropathy), chronic back pain status post 2 neck and 2 low back surgeries presents with a stroke.  As per patient's wife he started having symptoms of staggering gait with slurring of speech, confusion and right-sided numbness around 3/21.  He went to the hospital a day later where he had extensive testing.  CT scan of the head showed a hyper density in the left posterior parietal region.  CT perfusion also showed a perfusion deficit in that area.  CT angiogram of the head and neck showed mild to moderate stenosis in bilateral distal MCAs left more than right as well as atherosclerotic plaques in both carotid bifurcations with no major stenosis.  MRI brain showed bilateral embolic infarcts in multiple vascular territories with the largest one being in the left posterior parietal region.  He had a TTE andTEE both of which showed a normal EF with no valvular abnormalities or PFO as well as no mass.  He also had a stress test which showed normal cardiac perfusion.  Patient states that he  had also been having chest pains for the past few months with occasional palpitations.  Troponins were mildly elevated.  He was evaluated by cardiology in the hospital who felt he could be maintained on dual antiplatelets as well as Lipitor 80 mg daily.  His lipid panel was as follows-, , LDL 83, HDL 28.  A1c was 5.4.  Patient was smoking half a pack of cigarettes every day prior to his stroke but has quit smoking now.  He underwent PT/speech therapy after discharge but has now stopped due to COVID.  As per his wife he continues to get mild memory problems and speech deficits even now.  Of note-I personally reviewed his MRI brain and Dr. Larson's note from the hospital.    The following portions of the patient's history were reviewed today and updated as of 05/21/2020  : allergies, current medications, past family history, past medical history, past social history, past surgical history and problem list  These document will be scanned to patient's chart.      Current Outpatient Medications:     aspirin 81 MG EC tablet, Take 1 tablet by mouth Daily., Disp: , Rfl:     acetaminophen (TYLENOL) 325 MG tablet, Take 2 tablets by mouth Every 4 (Four) Hours As Needed for Mild Pain  or Headache. (Patient taking differently: Take 2 tablets by mouth Daily As Needed for Mild Pain or Headache.), Disp: , Rfl:     apixaban (Eliquis) 5 MG tablet tablet, TAKE 1 TABLET BY MOUTH EVERY 12 HOURS, Disp: 60 tablet, Rfl: 1    atorvastatin (LIPITOR) 40 MG tablet, TAKE 1 TABLET BY MOUTH DAILY, Disp: 30 tablet, Rfl: 11    cephalexin (KEFLEX) 500 MG capsule, Take 1 capsule by mouth 2 (Two) Times a Day., Disp: 14 capsule, Rfl: 0    cetirizine (ZyrTEC) 10 MG tablet, Take 1 tablet by mouth Daily As Needed for allergies. (Patient taking differently: Take 1 tablet by mouth Daily.), Disp: 30 tablet, Rfl: 2    Cholecalciferol (Vitamin D3) 25 MCG (1000 UT) capsule, Take 1 capsule by mouth Daily., Disp: , Rfl:     famotidine (PEPCID) 20 MG  tablet, Take 0.5 tablets by mouth 2 (Two) Times a Day., Disp: , Rfl:     folic acid (FOLVITE) 1 MG tablet, Take 1 tablet by mouth Daily., Disp: , Rfl:     furosemide (LASIX) 20 MG tablet, Take 1 tablet by mouth Daily As Needed (edema)., Disp: 90 tablet, Rfl: 1    gabapentin (NEURONTIN) 400 MG capsule, Take 300 mg by mouth 3 (Three) Times a Day., Disp: , Rfl:     lisinopril (PRINIVIL,ZESTRIL) 2.5 MG tablet, Take 1 tablet by mouth Daily., Disp: , Rfl:     meclizine (ANTIVERT) 25 MG tablet, Take 1 tablet by mouth As Needed., Disp: , Rfl:     omega-3 acid ethyl esters (LOVAZA) 1 g capsule, Take 1 capsule by mouth Daily., Disp: , Rfl:     oxyCODONE-acetaminophen (PERCOCET)  MG per tablet, Take 1 tablet by mouth 3 (Three) Times a Day., Disp: , Rfl:     pantoprazole (PROTONIX) 40 MG EC tablet, Take 1 tablet by mouth Daily., Disp: , Rfl:     promethazine (PHENERGAN) 25 MG tablet, Take 1 tablet by mouth Every 6 (Six) Hours As Needed., Disp: , Rfl:     rizatriptan (MAXALT) 10 MG tablet, Take 1 tablet by mouth As Needed for Migraine. May repeat in 2 hours if needed, Disp: , Rfl:    Past Medical History:   Diagnosis Date    Allergic rhinitis     Anxiety     Arthritis     ASVD (arteriosclerotic vascular disease)     B12 deficiency     Salvador's esophagus     Benign prostate hyperplasia     Confusion     CVA (cerebral vascular accident)     Degeneration of lumbar intervertebral disc     Degenerative cervical disc     Depression     Dupuytren contracture     s/p bilateral hand surgery    Dyspepsia     Dyspnea     Fatigue     Gastritis     GERD (gastroesophageal reflux disease)     Hyperlipidemia     Hypertension     IgA nephropathy     Kidney infection     Microscopic hematuria     Migraine     Muscle spasm     Nausea     Pancreatitis     Prostate disease     RA (rheumatoid arthritis)     Serum lipase elevation     Vitamin D deficiency       Past Surgical History:   Procedure Laterality Date    BACK SURGERY  06/2022    Spine  "center in [place]    CARDIAC CATHETERIZATION      02/25/2022 PER DR. MEAD    CARDIAC CATHETERIZATION Left 02/25/2022    Procedure: Left Heart Cath;  Surgeon: Terrell Mead MD;  Location:  [facility];  Service: Cardiology;  Laterality: Left;  progressive chest pain    CARDIAC ELECTROPHYSIOLOGY PROCEDURE N/A 07/30/2020    Procedure: Loop insertion;  Surgeon: Terrell Mead MD;  Location:  [facility];  Service: Cardiovascular;  Laterality: N/A;    CARDIAC ELECTROPHYSIOLOGY PROCEDURE N/A 2/21/2025    Procedure: Loop recorder removal;  Surgeon: Terrell Mead MD;  Location:  [facility];  Service: Cardiovascular;  Laterality: N/A;    CERVICAL DISCECTOMY ANTERIOR  02/2017    C6-7 at [facility] in [place]    HAND SURGERY      LUMBAR DISCECTOMY      \"Spinal\" times 2 in 2003 and 2005    UMBILICAL HERNIA REPAIR      Dr. Diego Vasquez      Family History   Problem Relation Age of Onset    Hypertension Other     Other Other     Hypertension Mother     Heart disease Father     Hypertension Father     Diabetes Father     Arthritis Neg Hx     Hyperlipidemia Neg Hx       Social History     Socioeconomic History    Marital status:    Tobacco Use    Smoking status: Every Day     Current packs/day: 0.50     Types: Cigarettes     Passive exposure: Current    Smokeless tobacco: Never   Vaping Use    Vaping status: Never Used   Substance and Sexual Activity    Alcohol use: Never    Drug use: Never    Sexual activity: Defer     Review of Systems   Constitutional:  Positive for fatigue.   Musculoskeletal:  Positive for back pain.   Neurological:  Positive for memory problem.   All other systems reviewed and are negative.      Objective:    /84   Pulse 91   Ht 188 cm (74\")   Wt 90.7 kg (200 lb)   SpO2 94%   BMI 25.68 kg/m²     Neurology Exam:    General apperance: NAD.     Mental status: Alert, awake and oriented to time place and person.  Could tell me the " month, year and his ZIP Code    Recent and Remote memory: Intact.  Delayed recall of 3/3 today as well    Attention span and Concentration: Normal.     Language and Speech: Intact- No dysarthria.    Fluency, Naming , Repitition and Comprehension:  Intact    Cranial Nerves:   CN II: Visual fields are full. Intact. Fundi - Normal, No papillederma, Pupils - ELISA  CN III, IV and VI: Extraocular movements are intact. Normal saccades.   CN V: Facial sensation is intact.   CN VII: Muscles of facial expression reveal no asymmetry. Intact.   CN VIII: Hearing is intact. Whispered voice intact.   CN IX and X: Palate elevates symmetrically. Intact  CN XI: Shoulder shrug is intact.   CN XII: Tongue is midline without evidence of atrophy or fasciculation.     Motor:  5/5 in all extremities    Sensory- mildly reduced to light touch in right upper extremity    Co-ordination: Normal finger-to-nose, heel to shin B/L.    Rhomberg: Negative.    Gait: Mild antalgic gait due to low back pain    Assessment and Plan:  1. Sequelae, post-stroke  Patient had bilateral embolic infarcts which could be due to paroxysmal atrial fibrillation.  Loop recorder did not shown any episodes of A. Fib.  I explained to him that his mild memory problems may be due to his stroke.  He had bilateral embolic infarcts however the largest one was in the left parietal region which initially caused some aphasia.  currently on aspirin 81 mg and Eliquis 5 mg twice a day   He should continue Lipitor  40 mg daily for goal LDL of less than 100.  His last LDL was 43  Maintain blood pressure less than 130/90.  His blood pressure is well controlled    2.  Obstructive sleep apnea  I will give him a new sleep medicine referral as his last one was in 2021.  His sleep study then showed moderate ANI he needs a new machine    3.  Mild cognitive impairment  Most likely due to his stroke, untreated sleep apnea and his medications (opiates/gabapentin).   Since donepezil has not  helped I have told him to gradually stop it.  He can resume the medication if he notices a deterioration in memory after stopping it  Counseling given to carry out both physical (should walk for at least 20 minutes a day) and mental exercises such as reading, solving crossword puzzles etc.    4.Smoking  I have once again counseled him strongly to cut back on his smoking    Return in about 1 year (around 3/11/2026).     I spent 25 minutes out of which 20 minutes were spent face-to-face  Nani Carmona MD

## 2025-03-19 RX ORDER — DONEPEZIL HYDROCHLORIDE 10 MG/1
10 TABLET, FILM COATED ORAL NIGHTLY
Qty: 30 TABLET | Refills: 11 | OUTPATIENT
Start: 2025-03-19 | End: 2026-03-19

## 2025-03-19 NOTE — TELEPHONE ENCOUNTER
Rx Refill Note  Requested Prescriptions     Pending Prescriptions Disp Refills    donepezil (ARICEPT) 10 MG tablet [Pharmacy Med Name: DONEPEZIL 10MG TABLETS] 30 tablet 11     Sig: TAKE 1 TABLET BY MOUTH EVERY NIGHT      Last filled:  Last office visit with prescribing clinician: 3/11/2025      Next office visit with prescribing clinician: 3/11/2026     Pebbles Nguyen MA  03/19/25, 14:35 EDT    The original prescription was discontinued on 2/21/2025 by Lata Conway, RN. Renewing this prescription may not be appropriate

## 2025-05-02 DIAGNOSIS — I69.30 SEQUELAE, POST-STROKE: ICD-10-CM

## 2025-05-02 RX ORDER — APIXABAN 5 MG/1
5 TABLET, FILM COATED ORAL EVERY 12 HOURS SCHEDULED
Qty: 60 TABLET | Refills: 2 | Status: SHIPPED | OUTPATIENT
Start: 2025-05-02

## 2025-05-02 NOTE — TELEPHONE ENCOUNTER
Rx Refill Note  Requested Prescriptions     Pending Prescriptions Disp Refills    Eliquis 5 MG tablet tablet [Pharmacy Med Name: ELIQUIS 5MG TABLETS] 60 tablet 1     Sig: TAKE 1 TABLET BY MOUTH EVERY 12 HOURS      Last filled:  2/4/25 1 ref  Last office visit with prescribing clinician: 3/11/2025      Next office visit with prescribing clinician: 3/11/2026     Pebbles Nguyen MA  05/02/25, 10:29 EDT      Sent to pharmacy

## 2025-05-12 ENCOUNTER — OUTSIDE FACILITY SERVICE (OUTPATIENT)
Dept: GASTROENTEROLOGY | Facility: CLINIC | Age: 55
End: 2025-05-12
Payer: COMMERCIAL

## 2025-05-12 PROCEDURE — 43249 ESOPH EGD DILATION <30 MM: CPT | Performed by: INTERNAL MEDICINE

## 2025-05-12 PROCEDURE — 43239 EGD BIOPSY SINGLE/MULTIPLE: CPT | Performed by: INTERNAL MEDICINE

## 2025-05-12 RX ORDER — PANTOPRAZOLE SODIUM 40 MG/1
40 TABLET, DELAYED RELEASE ORAL 2 TIMES DAILY
Qty: 60 TABLET | Refills: 11 | Status: SHIPPED | OUTPATIENT
Start: 2025-05-12

## 2025-05-16 ENCOUNTER — RESULTS FOLLOW-UP (OUTPATIENT)
Dept: GASTROENTEROLOGY | Facility: CLINIC | Age: 55
End: 2025-05-16
Payer: COMMERCIAL

## 2025-05-16 ENCOUNTER — PRIOR AUTHORIZATION (OUTPATIENT)
Dept: GASTROENTEROLOGY | Facility: CLINIC | Age: 55
End: 2025-05-16
Payer: COMMERCIAL

## 2025-05-16 NOTE — TELEPHONE ENCOUNTER
A Prior Authorization has been started for Pantoprazole through [employer].    Approved today by [place] [employer] Med[employer]act 2017  The request has been approved. The authorization is effective from 05/16/2025 to 05/16/2026, as long as the member is enrolled in their current health plan. This has been approved for a max daily dosage of 2.0.

## 2025-05-16 NOTE — LETTER
May 16, 2025    Lux Mckoy  [address] [place] [address]    : 1970      Dear Mr. Mckoy:  This letter is to review the biopsy report from your May 12, 2025 upper endoscopy.    Biopsies from the esophagus were unremarkable.  More specifically and importantly, there was no evidence of eosinophilic esophagitis nor any evidence of Salvador's esophagus/intestinal metaplasia of the esophagus.  In light of these findings, there is no specific need for repeat upper endoscopy 3 years from now.    I would have you continue taking pantoprazole/Protonix given prior finding of Salvador's esophagus.    We were able to dilate your esophagus to 20 mm diameter with a through-the-scope balloon.  This is a large balloon typically available for esophageal dilations.  Your swallowing should have normalized.  If that is not the case please make sure to let us know so we can make further recommendations.    I hope this letter finds you well.  I encourage you to call with any questions or concerns you may have.    Sincerely,  Errol Springer MD    CC: Emani Alas PA-C

## 2025-05-16 NOTE — TELEPHONE ENCOUNTER
May 16, 2025    uLx Mckoy  [address] [facility] [place] [address]    : 1970      Dear Mr. Mckoy:  This letter is to review the biopsy report from your May 12, 2025 upper endoscopy.    Biopsies from the esophagus were unremarkable.  More specifically and importantly, there was no evidence of eosinophilic esophagitis nor any evidence of Salvador's esophagus/intestinal metaplasia of the esophagus.  In light of these findings, there is no specific need for repeat upper endoscopy 3 years from now.    I would have you continue taking pantoprazole/Protonix given prior finding of Salvador's esophagus.    We were able to dilate your esophagus to 20 mm diameter with a through-the-scope balloon.  This is a large balloon typically available for esophageal dilations.  Your swallowing should have normalized.  If that is not the case please make sure to let us know so we can make further recommendations.    I hope this letter finds you well.  I encourage you to call with any questions or concerns you may have.    Sincerely,  Errol Springer MD    CC: Emani Alas PA-C

## 2025-06-28 ENCOUNTER — HOSPITAL ENCOUNTER (EMERGENCY)
Facility: HOSPITAL | Age: 55
Discharge: HOME | End: 2025-06-28
Attending: STUDENT IN AN ORGANIZED HEALTH CARE EDUCATION/TRAINING PROGRAM
Payer: COMMERCIAL

## 2025-06-28 ENCOUNTER — APPOINTMENT (OUTPATIENT)
Facility: HOSPITAL | Age: 55
End: 2025-06-28
Payer: COMMERCIAL

## 2025-06-28 VITALS
SYSTOLIC BLOOD PRESSURE: 119 MMHG | BODY MASS INDEX: 24.42 KG/M2 | OXYGEN SATURATION: 100 % | DIASTOLIC BLOOD PRESSURE: 82 MMHG | WEIGHT: 190.3 LBS | RESPIRATION RATE: 20 BRPM | TEMPERATURE: 98 F | HEIGHT: 74 IN | HEART RATE: 58 BPM

## 2025-06-28 DIAGNOSIS — R10.9 RIGHT FLANK PAIN: ICD-10-CM

## 2025-06-28 DIAGNOSIS — R79.89 ELEVATED SERUM CREATININE: Primary | ICD-10-CM

## 2025-06-28 LAB
ALBUMIN SERPL-MCNC: 4.3 G/DL (ref 3.5–5.2)
ALBUMIN/GLOB SERPL: 1.7 G/DL
ALP SERPL-CCNC: 79 U/L (ref 39–117)
ALT SERPL W P-5'-P-CCNC: 27 U/L (ref 1–41)
ANION GAP SERPL CALCULATED.3IONS-SCNC: 10.2 MMOL/L (ref 5–15)
AST SERPL-CCNC: 32 U/L (ref 1–40)
BACTERIA UR QL AUTO: ABNORMAL /HPF
BASOPHILS # BLD AUTO: 0.07 10*3/MM3 (ref 0–0.2)
BASOPHILS NFR BLD AUTO: 0.7 % (ref 0–1.5)
BILIRUB SERPL-MCNC: 0.8 MG/DL (ref 0–1.2)
BILIRUB UR QL STRIP: NEGATIVE
BUN SERPL-MCNC: 26.2 MG/DL (ref 6–20)
BUN/CREAT SERPL: 15.4 (ref 7–25)
CALCIUM SPEC-SCNC: 8.7 MG/DL (ref 8.6–10.5)
CHLORIDE SERPL-SCNC: 107 MMOL/L (ref 98–107)
CLARITY UR: CLEAR
CO2 SERPL-SCNC: 24.8 MMOL/L (ref 22–29)
COLOR UR: YELLOW
CREAT SERPL-MCNC: 1.7 MG/DL (ref 0.76–1.27)
D-LACTATE SERPL-SCNC: 0.9 MMOL/L (ref 0.5–2)
DEPRECATED RDW RBC AUTO: 43.3 FL (ref 37–54)
EGFRCR SERPLBLD CKD-EPI 2021: 47.3 ML/MIN/1.73
EOSINOPHIL # BLD AUTO: 0.14 10*3/MM3 (ref 0–0.4)
EOSINOPHIL NFR BLD AUTO: 1.5 % (ref 0.3–6.2)
ERYTHROCYTE [DISTWIDTH] IN BLOOD BY AUTOMATED COUNT: 13 % (ref 12.3–15.4)
GLOBULIN UR ELPH-MCNC: 2.6 GM/DL
GLUCOSE SERPL-MCNC: 93 MG/DL (ref 65–99)
GLUCOSE UR STRIP-MCNC: NEGATIVE MG/DL
HCT VFR BLD AUTO: 42.7 % (ref 37.5–51)
HGB BLD-MCNC: 14.3 G/DL (ref 13–17.7)
HGB UR QL STRIP.AUTO: ABNORMAL
HOLD SPECIMEN: NORMAL
HYALINE CASTS UR QL AUTO: ABNORMAL /LPF
IMM GRANULOCYTES # BLD AUTO: 0.01 10*3/MM3 (ref 0–0.05)
IMM GRANULOCYTES NFR BLD AUTO: 0.1 % (ref 0–0.5)
KETONES UR QL STRIP: NEGATIVE
LEUKOCYTE ESTERASE UR QL STRIP.AUTO: NEGATIVE
LIPASE SERPL-CCNC: 20 U/L (ref 13–60)
LYMPHOCYTES # BLD AUTO: 2.17 10*3/MM3 (ref 0.7–3.1)
LYMPHOCYTES NFR BLD AUTO: 22.6 % (ref 19.6–45.3)
MCH RBC QN AUTO: 29.9 PG (ref 26.6–33)
MCHC RBC AUTO-ENTMCNC: 33.5 G/DL (ref 31.5–35.7)
MCV RBC AUTO: 89.3 FL (ref 79–97)
MONOCYTES # BLD AUTO: 0.71 10*3/MM3 (ref 0.1–0.9)
MONOCYTES NFR BLD AUTO: 7.4 % (ref 5–12)
NEUTROPHILS NFR BLD AUTO: 6.5 10*3/MM3 (ref 1.7–7)
NEUTROPHILS NFR BLD AUTO: 67.7 % (ref 42.7–76)
NITRITE UR QL STRIP: NEGATIVE
PH UR STRIP.AUTO: 6 [PH] (ref 5–8)
PLATELET # BLD AUTO: 249 10*3/MM3 (ref 140–450)
PMV BLD AUTO: 9.6 FL (ref 6–12)
POTASSIUM SERPL-SCNC: 4.9 MMOL/L (ref 3.5–5.2)
PROT SERPL-MCNC: 6.9 G/DL (ref 6–8.5)
PROT UR QL STRIP: NEGATIVE
RBC # BLD AUTO: 4.78 10*6/MM3 (ref 4.14–5.8)
RBC # UR STRIP: ABNORMAL /HPF
REF LAB TEST METHOD: ABNORMAL
SODIUM SERPL-SCNC: 142 MMOL/L (ref 136–145)
SP GR UR STRIP: 1.02 (ref 1–1.03)
SQUAMOUS #/AREA URNS HPF: ABNORMAL /HPF
UROBILINOGEN UR QL STRIP: ABNORMAL
WBC # UR STRIP: ABNORMAL /HPF
WBC NRBC COR # BLD AUTO: 9.6 10*3/MM3 (ref 3.4–10.8)
WHOLE BLOOD HOLD COAG: NORMAL
WHOLE BLOOD HOLD SPECIMEN: NORMAL

## 2025-06-28 PROCEDURE — 83690 ASSAY OF LIPASE: CPT | Performed by: STUDENT IN AN ORGANIZED HEALTH CARE EDUCATION/TRAINING PROGRAM

## 2025-06-28 PROCEDURE — 80053 COMPREHEN METABOLIC PANEL: CPT | Performed by: STUDENT IN AN ORGANIZED HEALTH CARE EDUCATION/TRAINING PROGRAM

## 2025-06-28 PROCEDURE — 71101 X-RAY EXAM UNILAT RIBS/CHEST: CPT

## 2025-06-28 PROCEDURE — 85025 COMPLETE CBC W/AUTO DIFF WBC: CPT | Performed by: STUDENT IN AN ORGANIZED HEALTH CARE EDUCATION/TRAINING PROGRAM

## 2025-06-28 PROCEDURE — 83605 ASSAY OF LACTIC ACID: CPT | Performed by: STUDENT IN AN ORGANIZED HEALTH CARE EDUCATION/TRAINING PROGRAM

## 2025-06-28 PROCEDURE — 74176 CT ABD & PELVIS W/O CONTRAST: CPT

## 2025-06-28 PROCEDURE — 25810000003 SODIUM CHLORIDE 0.9 % SOLUTION: Performed by: PHYSICIAN ASSISTANT

## 2025-06-28 PROCEDURE — 71250 CT THORAX DX C-: CPT

## 2025-06-28 PROCEDURE — 81001 URINALYSIS AUTO W/SCOPE: CPT | Performed by: STUDENT IN AN ORGANIZED HEALTH CARE EDUCATION/TRAINING PROGRAM

## 2025-06-28 PROCEDURE — 99284 EMERGENCY DEPT VISIT MOD MDM: CPT | Performed by: STUDENT IN AN ORGANIZED HEALTH CARE EDUCATION/TRAINING PROGRAM

## 2025-06-28 RX ORDER — SODIUM CHLORIDE 9 MG/ML
10 INJECTION, SOLUTION INTRAMUSCULAR; INTRAVENOUS; SUBCUTANEOUS AS NEEDED
Status: DISCONTINUED | OUTPATIENT
Start: 2025-06-28 | End: 2025-06-28 | Stop reason: HOSPADM

## 2025-06-28 RX ADMIN — SODIUM CHLORIDE 1000 ML: 9 INJECTION, SOLUTION INTRAVENOUS at 17:08

## 2025-06-28 NOTE — FSED PROVIDER NOTE
Subjective  History of Present Illness:    This is a 55 year old male who presents with complaints of right flank pain intermittently with intermittent flecks of blood in his urine. Patient states that he has a history of chronic kidney disease, IgA nephropathy, prostate disease, CVA, hypertension.  Patient states that over the past week he has had intermittent right flank pain and has noticed intermittent flecks.  He has not had any dysuria.  He is worried about a possible kidney infection.  Patient also fell off a ladder about 5 feet a week and a half ago and has a large contusion to his right ribs with significant pain and was wanting us to evaluate this as well.  He has no shortness of breath.  He was not evaluated for this fall.      Nurses Notes reviewed and agree, including vitals, allergies, social history and prior medical history.     REVIEW OF SYSTEMS: All systems reviewed and not pertinent unless noted.  Review of Systems    Past Medical History:   Diagnosis Date    Allergic rhinitis     Anxiety     Arthritis     ASVD (arteriosclerotic vascular disease)     B12 deficiency     Salvador's esophagus     Benign prostate hyperplasia     Confusion     CVA (cerebral vascular accident)     Degeneration of lumbar intervertebral disc     Degenerative cervical disc     Depression     Dupuytren contracture     s/p bilateral hand surgery    Dyspepsia     Dyspnea     Fatigue     Gastritis     GERD (gastroesophageal reflux disease)     Hyperlipidemia     Hypertension     IgA nephropathy     Kidney infection     Microscopic hematuria     Migraine     Muscle spasm     Nausea     Pancreatitis     Prostate disease     RA (rheumatoid arthritis)     Serum lipase elevation     Vitamin D deficiency        Allergies:    Amoxicillin, Augmentin [amoxicillin-pot clavulanate], Bactrim [sulfamethoxazole-trimethoprim], Clavulanic acid, Sulfamethoxazole, and Trimethoprim      Past Surgical History:   Procedure Laterality Date    BACK  "SURGERY  06/2022    [facility] in [place]    CARDIAC CATHETERIZATION      02/25/2022 PER DR. MEAD    CARDIAC CATHETERIZATION Left 02/25/2022    Procedure: Left Heart Cath;  Surgeon: Terrell Mead MD;  Location:  [facility];  Service: Cardiology;  Laterality: Left;  progressive chest pain    CARDIAC ELECTROPHYSIOLOGY PROCEDURE N/A 07/30/2020    Procedure: Loop insertion;  Surgeon: Terrell Mead MD;  Location:  [facility];  Service: Cardiovascular;  Laterality: N/A;    CARDIAC ELECTROPHYSIOLOGY PROCEDURE N/A 2/21/2025    Procedure: Loop recorder removal;  Surgeon: Terrell Mead MD;  Location:  [facility];  Service: Cardiovascular;  Laterality: N/A;    CERVICAL DISCECTOMY ANTERIOR  02/2017    C6-7 at [facility] in [place]    HAND SURGERY      LUMBAR DISCECTOMY      \"Spinal\" times 2 in 2003 and 2005    UMBILICAL HERNIA REPAIR      Dr. Diego Vasquez         Social History     Socioeconomic History    Marital status:    Tobacco Use    Smoking status: Every Day     Current packs/day: 0.50     Types: Cigarettes     Passive exposure: Current    Smokeless tobacco: Never   Vaping Use    Vaping status: Never Used   Substance and Sexual Activity    Alcohol use: Never    Drug use: Never    Sexual activity: Defer         Family History   Problem Relation Age of Onset    Hypertension Other     Other Other     Hypertension Mother     Heart disease Father     Hypertension Father     Diabetes Father     Arthritis Neg Hx     Hyperlipidemia Neg Hx        Objective  Physical Exam:  /84   Pulse 60   Temp 98 °F (36.7 °C) (Oral)   Resp 20   Ht 188 cm (74\")   Wt 86.3 kg (190 lb 4.8 oz)   SpO2 95%   BMI 24.43 kg/m²      Physical Exam  Vitals and nursing note reviewed.   Constitutional:       Appearance: Normal appearance.   HENT:      Mouth/Throat:      Mouth: Mucous membranes are moist.   Eyes:      Pupils: Pupils are equal, round, and reactive to " light.   Cardiovascular:      Rate and Rhythm: Normal rate and regular rhythm.      Pulses: Normal pulses.      Heart sounds: Normal heart sounds.   Pulmonary:      Effort: Pulmonary effort is normal.      Breath sounds: Normal breath sounds.   Chest:      Comments: No crepitus, no deformity, no step offs.  Abdominal:      General: Abdomen is flat.      Comments: No CVA tenderness   Genitourinary:     Comments: No CVA tenderness  Skin:     General: Skin is warm.      Comments: Large contusion to the right lateral chest.    Neurological:      General: No focal deficit present.      Mental Status: He is alert.   Psychiatric:         Mood and Affect: Mood normal.         Procedures    ED Course:         Lab Results (last 24 hours)       Procedure Component Value Units Date/Time    CBC & Differential [[id number]]  (Normal) Collected: 06/28/25 1502    Specimen: Blood Updated: 06/28/25 1508    Narrative:      The following orders were created for panel order CBC & Differential.  Procedure                               Abnormality         Status                     ---------                               -----------         ------                     CBC Auto Differential[[id number]]        Normal              Final result                 Please view results for these tests on the individual orders.    Comprehensive Metabolic Panel [[id number]]  (Abnormal) Collected: 06/28/25 1502    Specimen: Blood Updated: 06/28/25 1525     Glucose 93 mg/dL      BUN 26.2 mg/dL      Creatinine 1.70 mg/dL      Sodium 142 mmol/L      Potassium 4.9 mmol/L      Chloride 107 mmol/L      CO2 24.8 mmol/L      Calcium 8.7 mg/dL      Total Protein 6.9 g/dL      Albumin 4.3 g/dL      ALT (SGPT) 27 U/L      AST (SGOT) 32 U/L      Alkaline Phosphatase 79 U/L      Total Bilirubin 0.8 mg/dL      Globulin 2.6 gm/dL      A/G Ratio 1.7 g/dL      BUN/Creatinine Ratio 15.4     Anion Gap 10.2 mmol/L      eGFR 47.3 mL/min/1.73     Narrative:      GFR Categories  in Chronic Kidney Disease (CKD)              GFR Category          GFR (mL/min/1.73)    Interpretation  G1                    90 or greater        Normal or high (1)  G2                    60-89                Mild decrease (1)  G3a                   45-59                Mild to moderate decrease  G3b                   30-44                Moderate to severe decrease  G4                    15-29                Severe decrease  G5                    14 or less           Kidney failure    (1)In the absence of evidence of kidney disease, neither GFR category G1 or G2 fulfill the criteria for CKD.    eGFR calculation 2021 CKD-EPI creatinine equation, which does not include race as a factor    Lipase [[id number]]  (Normal) Collected: 06/28/25 1502    Specimen: Blood Updated: 06/28/25 1524     Lipase 20 U/L     Lactic Acid, Plasma [[id number]]  (Normal) Collected: 06/28/25 1502    Specimen: Blood Updated: 06/28/25 1523     Lactate 0.9 mmol/L     CBC Auto Differential [[id number]]  (Normal) Collected: 06/28/25 1502    Specimen: Blood Updated: 06/28/25 1508     WBC 9.60 10*3/mm3      RBC 4.78 10*6/mm3      Hemoglobin 14.3 g/dL      Hematocrit 42.7 %      MCV 89.3 fL      MCH 29.9 pg      MCHC 33.5 g/dL      RDW 13.0 %      RDW-SD 43.3 fl      MPV 9.6 fL      Platelets 249 10*3/mm3      Neutrophil % 67.7 %      Lymphocyte % 22.6 %      Monocyte % 7.4 %      Eosinophil % 1.5 %      Basophil % 0.7 %      Immature Grans % 0.1 %      Neutrophils, Absolute 6.50 10*3/mm3      Lymphocytes, Absolute 2.17 10*3/mm3      Monocytes, Absolute 0.71 10*3/mm3      Eosinophils, Absolute 0.14 10*3/mm3      Basophils, Absolute 0.07 10*3/mm3      Immature Grans, Absolute 0.01 10*3/mm3     Urinalysis With Microscopic If Indicated (No Culture) - Urine, Clean Catch [[id number]]  (Abnormal) Collected: 06/28/25 1507    Specimen: Urine, Clean Catch Updated: 06/28/25 1514     Color, UA Yellow     Appearance, UA Clear     pH, UA 6.0     Specific Gravity,  UA 1.020     Glucose, UA Negative     Ketones, UA Negative     Bilirubin, UA Negative     Blood, UA Trace     Protein, UA Negative     Leuk Esterase, UA Negative     Nitrite, UA Negative     Urobilinogen, UA 0.2 E.U./dL    Urinalysis, Microscopic Only - Urine, Clean Catch [[id number]]  (Abnormal) Collected: 06/28/25 1507    Specimen: Urine, Clean Catch Updated: 06/28/25 1519     RBC, UA 3-5 /HPF      WBC, UA 0-2 /HPF      Bacteria, UA None Seen /HPF      Squamous Epithelial Cells, UA 0-2 /HPF      Hyaline Casts, UA 3-6 /LPF      Methodology Manual Light Microscopy             CT Chest Without Contrast Diagnostic  Result Date: 6/28/2025  CT ABDOMEN PELVIS WO CONTRAST, CT CHEST WO CONTRAST DIAGNOSTIC Date of Exam: 6/28/2025 4:01 PM EDT Indication: right flank pain. Comparison: CT chest 3/25/2016, CT abdomen 8/15/2017 Technique: Axial CT images were obtained of the chest, abdomen and pelvis without the administration of contrast. Reconstructed coronal and sagittal images were also obtained. Automated exposure control and iterative construction methods were used. Findings: Chest: There is hyperdense hematoma within the right pectoralis minor muscle. Thoracic aorta is of normal caliber. No mediastinal hematoma. No adenopathy. No pleural effusion. No pneumothorax. No focal airspace consolidation. There is some linear scarring or atelectasis within the lateral right lower lobe. Lungs are otherwise clear. ABDOMEN: Liver, pancreas, and spleen are within normal limits. Gallbladder is contracted. Bilateral adrenal glands are within normal limits. Low-density masses noted within the lateral cortex of the left kidney compatible with a cyst. There is also a cyst of the upper pole the right kidney. No abdominal or retroperitoneal adenopathy. Upper GI tract is within normal limits. No free intraperitoneal fluid or air identified. Urinary bladder is within normal limits. There are multiple colonic diverticula. No evidence of  diverticulitis. The appendix is normal. No pelvic or inguinal adenopathy. No free intraperitoneal fluid. No acute fracture identified. No lytic or sclerotic bony lesion. Bones:     Impression: Impression: 1. Hematoma within the right pectoralis minor muscle. 2. No evidence of solid abdominal organ injury or free intraperitoneal fluid. 3. No acute fractures. Electronically Signed: Royal Kong MD  6/28/2025 4:35 PM EDT  Workstation ID: [id number]    CT Abdomen Pelvis Without Contrast  Result Date: 6/28/2025  CT ABDOMEN PELVIS WO CONTRAST, CT CHEST WO CONTRAST DIAGNOSTIC Date of Exam: 6/28/2025 4:01 PM EDT Indication: right flank pain. Comparison: CT chest 3/25/2016, CT abdomen 8/15/2017 Technique: Axial CT images were obtained of the chest, abdomen and pelvis without the administration of contrast. Reconstructed coronal and sagittal images were also obtained. Automated exposure control and iterative construction methods were used. Findings: Chest: There is hyperdense hematoma within the right pectoralis minor muscle. Thoracic aorta is of normal caliber. No mediastinal hematoma. No adenopathy. No pleural effusion. No pneumothorax. No focal airspace consolidation. There is some linear scarring or atelectasis within the lateral right lower lobe. Lungs are otherwise clear. ABDOMEN: Liver, pancreas, and spleen are within normal limits. Gallbladder is contracted. Bilateral adrenal glands are within normal limits. Low-density masses noted within the lateral cortex of the left kidney compatible with a cyst. There is also a cyst of the upper pole the right kidney. No abdominal or retroperitoneal adenopathy. Upper GI tract is within normal limits. No free intraperitoneal fluid or air identified. Urinary bladder is within normal limits. There are multiple colonic diverticula. No evidence of diverticulitis. The appendix is normal. No pelvic or inguinal adenopathy. No free intraperitoneal fluid. No acute fracture identified. No  lytic or sclerotic bony lesion. Bones:     Impression: Impression: 1. Hematoma within the right pectoralis minor muscle. 2. No evidence of solid abdominal organ injury or free intraperitoneal fluid. 3. No acute fractures. Electronically Signed: Royal Kong MD  6/28/2025 4:35 PM EDT  Workstation ID: [id number]    XR Ribs Right With PA Chest  Result Date: 6/28/2025  XR RIBS RIGHT W PA CHEST Date of Exam: 6/28/2025 3:15 PM EDT Indication: fall and brusing to right side ribs Comparison: 7/29/2020 Findings: Cardiomediastinal silhouette is unremarkable.  No airspace disease, pneumothorax, nor pleural effusion. No acute osseous abnormality identified.     Impression: Impression: No evidence of acute traumatic injury to the chest. Electronically Signed: Jean Claude Townsend MD  6/28/2025 3:34 PM EDT  Workstation ID: [id number]         MDM     Amount and/or Complexity of Data Reviewed  Clinical lab tests: reviewed  Tests in the radiology section of CPT®: reviewed        Initial impression of presenting illness: This is a 55 year old male who presents with complaints of right flank pain and intermittent flecks of blood in his urine, but none today. Patient denies any fevers, no dysuria. No cough. Also found to have a large contusion to the right chest wall. Patient found to have unremarkable CT, specifically no fractures, no kidney stones, no pyelonephritis.  Creatinine is elevated to 1.7.  His baseline is 1.3.  He is given a liter of fluids and will follow-up with his nephrologist, patient is eager to leave.    DDX: includes but is not limited to: Urinary tract infection, kidney stone, rib fractures      Medications   Sodium Chloride (PF) 0.9 % 10 mL (has no administration in time range)   sodium chloride 0.9 % bolus 1,000 mL (1,000 mL Intravenous New Bag 6/28/25 4621)       Data interpreted: Nursing notes reviewed, vital signs reviewed.  Labs independently interpreted by me (CBC, CMP, lipase, UA, troponin, ABG, lactic acid,  procalcitonin).  Imaging independently interpreted by me (x-ray, CT scan).  EKG independently interpreted by me.  O2 saturation:    Counseling: Discussed the results above with the patient regarding need for admission or discharge.  Patient understands and agrees plan of care.      -----  ED Disposition       ED Disposition   Discharge    Condition   Stable    Comment   --             Final diagnoses:   Elevated serum creatinine   Right flank pain      Your Follow-Up Providers       Go to  [facility] EMERGENCY DEPARTMENT [place].    Specialty: Emergency Medicine  Follow up details: As needed, If symptoms worsen  [address]  [facility] [address] [phone]             Emani Alas PA.    Specialty: Physician Assistant  Follow up details: As needed, If symptoms worsen  [address]  [place] [address] [phone]                       Contact information for after-discharge care    Follow-up information has not been specified.                    Your medication list        CHANGE how you take these medications        Instructions Last Dose Given Next Dose Due   cetirizine 10 MG tablet  Commonly known as: zyrTEC  What changed: when to take this      Take 1 tablet by mouth Daily As Needed for allergies.              CONTINUE taking these medications        Instructions Last Dose Given Next Dose Due   aspirin 81 MG EC tablet      Take 1 tablet by mouth Daily.       atorvastatin 40 MG tablet  Commonly known as: LIPITOR      TAKE 1 TABLET BY MOUTH DAILY       Eliquis 5 MG tablet tablet  Generic drug: apixaban      TAKE 1 TABLET BY MOUTH EVERY 12 HOURS       folic acid 1 MG tablet  Commonly known as: FOLVITE      Take 1 tablet by mouth Daily.       furosemide 20 MG tablet  Commonly known as: LASIX      Take 1 tablet by mouth Daily As Needed (edema).       gabapentin 400 MG capsule  Commonly known as: NEURONTIN      Take 300 mg by mouth 3 (Three) Times a Day.        lisinopril 2.5 MG tablet  Commonly known as: PRINIVIL,ZESTRIL      Take 1 tablet by mouth Daily.       meclizine 25 MG tablet  Commonly known as: ANTIVERT      Take 1 tablet by mouth As Needed.       omega-3 acid ethyl esters 1 g capsule  Commonly known as: LOVAZA      Take 1 capsule by mouth Daily.       oxyCODONE-acetaminophen  MG per tablet  Commonly known as: PERCOCET      Take 1 tablet by mouth 3 (Three) Times a Day.       pantoprazole 40 MG EC tablet  Commonly known as: PROTONIX      Take 1 tablet by mouth 2 (Two) Times a Day.       promethazine 25 MG tablet  Commonly known as: PHENERGAN      Take 1 tablet by mouth Every 6 (Six) Hours As Needed.       rizatriptan 10 MG tablet  Commonly known as: MAXALT      Take 1 tablet by mouth As Needed for Migraine. May repeat in 2 hours if needed

## 2025-06-28 NOTE — DISCHARGE INSTRUCTIONS
Please follow up with your primary nephrologist and primary care provider. Return to the emergency department for any worsening symptoms.

## 2025-06-30 NOTE — PROGRESS NOTES
Sleep Clinic Follow Up Note    Chief Complaint  Sleeping Problem, Sleep Apnea, and Follow-up (Follow up)    Subjective     History of Present Illness (from previous encounter on 9/24/2021 with Ms. Martinez):  Lux Mckoy is a 51 y.o. male here for follow-up of sleep apnea.  Patient has a history of dyslipidemia, hypertension, CVA, ASVD, vertigo, hypergonadism, GERD, chronic prostatitis, neuropathy, BPH, depression, anxiety, osteoarthritis, RA, chronic pain, migraines and recently sleep apnea.  He was seen here in consult complaining of nonrestorative sleep, snoring, gasping and witnessed apneas.  His sleep study 5/28/2021 did show moderate obstructive sleep apnea.  Patient states he can sleep 8 to 12 hours nightly and is still not rested upon awakening.  He will get up several times during the night but goes to sleep easily.  He has an [place] score of 9/24.  Patient is currently not using CPAP due to the Marisa recall.  We did discuss consequences of untreated sleep apnea versus health history patient wishes to put on hold for now and not restart until he receives his new machine. (End copied text).    Interval History:  Lux Mckoy is a 55 y.o. male returns for follow up and compliance of PAP therapy. The patient was last seen on 9/24/2021 by Ms. Juan. He has not been using a device. He had sent his device back on recall and received a new one but has not been using.  On average the patient sleeps 8 hours per night. The patient wakes 3-4 times per night.  Patient is interested in the inspire device.    The patient reports the following changes to their medical and medication history since they were last seen:  None    Further details are as follows:      [place] Scale is (out of 24): Total score: 14     Weight:  Current Weight: 189 lb      The patient's relevant past medical, surgical, family, and social history reviewed and updated in Epic as appropriate.    PMH:    Past Medical History:   Diagnosis  "Date    Allergic rhinitis     Anxiety     Arthritis     ASVD (arteriosclerotic vascular disease)     B12 deficiency     Salvador's esophagus     Benign prostate hyperplasia     Confusion     CVA (cerebral vascular accident)     Degeneration of lumbar intervertebral disc     Degenerative cervical disc     Depression     Dupuytren contracture     s/p bilateral hand surgery    Dyspepsia     Dyspnea     Fatigue     Gastritis     GERD (gastroesophageal reflux disease)     Hyperlipidemia     Hypertension     IgA nephropathy     Kidney infection     Microscopic hematuria     Migraine     Muscle spasm     Nausea     Pancreatitis     Prostate disease     RA (rheumatoid arthritis)     Serum lipase elevation     Vitamin D deficiency      Past Surgical History:   Procedure Laterality Date    BACK SURGERY  06/2022    [facility] in [place]    CARDIAC CATHETERIZATION      02/25/2022 PER DR. MEAD    CARDIAC CATHETERIZATION Left 02/25/2022    Procedure: Left Heart Cath;  Surgeon: Terrell Mead MD;  Location:  [facility];  Service: Cardiology;  Laterality: Left;  progressive chest pain    CARDIAC ELECTROPHYSIOLOGY PROCEDURE N/A 07/30/2020    Procedure: Loop insertion;  Surgeon: Terrell Mead MD;  Location:  [facility];  Service: Cardiovascular;  Laterality: N/A;    CARDIAC ELECTROPHYSIOLOGY PROCEDURE N/A 2/21/2025    Procedure: Loop recorder removal;  Surgeon: Terrell Mead MD;  Location:  [facility];  Service: Cardiovascular;  Laterality: N/A;    CERVICAL DISCECTOMY ANTERIOR  02/2017    C6-[facility] at [facility]    HAND SURGERY      LUMBAR DISCECTOMY      \"Spinal\" times 2 in 2003 and 2005    UMBILICAL HERNIA REPAIR      Dr. Diego Vasquez       Allergies   Allergen Reactions    Amoxicillin Unknown - Low Severity and Unknown (See Comments)    Augmentin [Amoxicillin-Pot Clavulanate] Itching    Bactrim [Sulfamethoxazole-Trimethoprim] Itching    Clavulanic Acid " Unknown - Low Severity    Sulfamethoxazole Unknown - Low Severity    Trimethoprim Unknown - Low Severity       MEDS:  Prior to Admission medications    Medication Sig Start Date End Date Taking? Authorizing Provider   apixaban (Eliquis) 5 MG tablet tablet TAKE 1 TABLET BY MOUTH EVERY 12 HOURS 5/2/25   Nani Carmona MD   aspirin 81 MG EC tablet Take 1 tablet by mouth Daily. 2/10/25   Eliana Horner MD   atorvastatin (LIPITOR) 40 MG tablet TAKE 1 TABLET BY MOUTH DAILY 11/1/24   Nani Carmona MD   cetirizine (ZyrTEC) 10 MG tablet Take 1 tablet by mouth Daily As Needed for allergies.  Patient taking differently: Take 1 tablet by mouth Daily. 11/1/16   Ozzie Castro MD   folic acid (FOLVITE) 1 MG tablet Take 1 tablet by mouth Daily. 3/26/20   Joe Rasmussen MD   furosemide (LASIX) 20 MG tablet Take 1 tablet by mouth Daily As Needed (edema). 2/6/23   Joey Velazquez MD   gabapentin (NEURONTIN) 400 MG capsule Take 300 mg by mouth 3 (Three) Times a Day. 10/4/20   Eliana Horner MD   lisinopril (PRINIVIL,ZESTRIL) 2.5 MG tablet Take 1 tablet by mouth Daily. 6/7/23   Eliana Horner MD   meclizine (ANTIVERT) 25 MG tablet Take 1 tablet by mouth As Needed.    Eliana Horner MD   omega-3 acid ethyl esters (LOVAZA) 1 g capsule Take 1 capsule by mouth Daily. 8/5/20   Eliana Horner MD   oxyCODONE-acetaminophen (PERCOCET)  MG per tablet Take 1 tablet by mouth 3 (Three) Times a Day.    Eliana Horner MD   pantoprazole (PROTONIX) 40 MG EC tablet Take 1 tablet by mouth 2 (Two) Times a Day. 5/12/25   Ray Springer MD   promethazine (PHENERGAN) 25 MG tablet Take 1 tablet by mouth Every 6 (Six) Hours As Needed. 9/28/20   Eliana Horner MD   rizatriptan (MAXALT) 10 MG tablet Take 1 tablet by mouth As Needed for Migraine. May repeat in 2 hours if needed    Eliana Horner MD         FH:  Family History   Problem Relation Age of Onset     "Hypertension Other     Other Other     Hypertension Mother     Heart disease Father     Hypertension Father     Diabetes Father     Arthritis Neg Hx     Hyperlipidemia Neg Hx        Objective   Vital Signs:  /70 (BP Location: Left arm, Patient Position: Sitting, Cuff Size: Adult)   Pulse 69   Temp 97.1 °F (36.2 °C) (Temporal)   Ht 188 cm (74.02\")   Wt 86 kg (189 lb 8 oz)   SpO2 96%   BMI 24.32 kg/m²     Patient's (Body mass index is 24.32 kg/m².) BMI is within normal parameters. No other follow-up for BMI required.        Physical Exam  Vitals reviewed.   Constitutional:       Appearance: Normal appearance.   HENT:      Head: Normocephalic and atraumatic.      Nose: Nose normal.      Mouth/Throat:      Mouth: Mucous membranes are moist.   Cardiovascular:      Rate and Rhythm: Normal rate and regular rhythm.      Heart sounds: No murmur heard.     No friction rub. No gallop.   Pulmonary:      Effort: Pulmonary effort is normal. No respiratory distress.      Breath sounds: Normal breath sounds. No wheezing or rhonchi.   Neurological:      Mental Status: He is alert and oriented to person, place, and time.   Psychiatric:         Behavior: Behavior normal.               Result Review :                Assessment and Plan  Lux Mckoy is a 55 y.o. male who returns for follow-up of sleep apnea.  Patient has a history of moderate obstructive sleep apnea.  He had an at home sleep test obtained on 5/29/2021 revealing an AHI of 21.4/H.  He had tried using a PAP device but could not get used to it.  It was a Marisa device and he returned it for a new recall device but discontinued use as well.  Patient had difficulty with the mask and had concerns that it may contribute to respiratory illness.  He has a history of CVA and I discussed the consequences of uncontrolled sleep apnea.  He is interested in the inspire device and would like referral to otolaryngology.  I noted that we may need to obtain another sleep " study.  Follow-up dependent upon surgical consult.  I have asked him to contact me should he wish to reinitiate PAP therapy.    Diagnoses and all orders for this visit:    1. Obstructive sleep apnea, adult (Primary)  -     Ambulatory Referral to ENT (Otolaryngology)    2. Overweight with body mass index (BMI) 25.0-29.9       The patient continues to use and benefit from PAP therapy.    1. The patient was counseled regarding multimodal approach with healthy nutrition, healthy sleep, regular physical activity, social activities, counseling, and medications. Encouraged to practice lateral sleep position. Avoid alcohol and sedatives close to bedtime.     2.  We will refill supplies x1 year.  Return to clinic 1 year or sooner if symptoms warrant. I have reviewed the results of my evaluation and impression and discussed my recommendations in detail with the patient.           Follow Up  Return in about 1 year (around 7/3/2026).  Patient was given instructions and counseling regarding his condition or for health maintenance advice. Please see specific information pulled into the AVS if appropriate.       ALDO Cordova, ACNP-BC  Pulmonology, Critical Care, and Sleep Medicine

## 2025-07-03 ENCOUNTER — OFFICE VISIT (OUTPATIENT)
Dept: SLEEP MEDICINE | Age: 55
End: 2025-07-03
Payer: COMMERCIAL

## 2025-07-03 VITALS
HEART RATE: 69 BPM | SYSTOLIC BLOOD PRESSURE: 120 MMHG | OXYGEN SATURATION: 96 % | WEIGHT: 189.5 LBS | BODY MASS INDEX: 24.32 KG/M2 | HEIGHT: 74 IN | DIASTOLIC BLOOD PRESSURE: 70 MMHG | TEMPERATURE: 97.1 F

## 2025-07-03 DIAGNOSIS — G47.33 OBSTRUCTIVE SLEEP APNEA, ADULT: Primary | ICD-10-CM

## 2025-07-03 DIAGNOSIS — E66.3 OVERWEIGHT WITH BODY MASS INDEX (BMI) 25.0-29.9: ICD-10-CM

## 2025-07-30 RX ORDER — CEPHALEXIN 500 MG/1
500 CAPSULE ORAL 2 TIMES DAILY
Qty: 14 CAPSULE | Refills: 0 | OUTPATIENT
Start: 2025-07-30 | End: 2025-08-06

## (undated) DEVICE — CATH DIAG EXPO M/ PK 5F FL4/FR4 PIG

## (undated) DEVICE — ST INF PRI SMRTSTE 20DRP 2VLV 24ML 117

## (undated) DEVICE — MODEL BT2000 P/N 700287-012KIT CONTENTS: MANIFOLD WITH SALINE AND CONTRAST PORTS, SALINE TUBING WITH SPIKE AND HAND SYRINGE, TRANSDUCER: Brand: BT2000 AUTOMATED MANIFOLD KIT

## (undated) DEVICE — GLIDESHEATH SLENDER STAINLESS STEEL KIT: Brand: GLIDESHEATH SLENDER

## (undated) DEVICE — DEBRIDEMENT KIT: Brand: MEDLINE INDUSTRIES, INC.

## (undated) DEVICE — MODEL AT P65, P/N 701554-001KIT CONTENTS: HAND CONTROLLER, 3-WAY HIGH-PRESSURE STOPCOCK WITH ROTATING END AND PREMIUM HIGH-PRESSURE TUBING: Brand: ANGIOTOUCH® KIT

## (undated) DEVICE — PK CATH CARD 10

## (undated) DEVICE — DEV COMP RAD PRELUDESYNC 24CM

## (undated) DEVICE — GW PERIPH GUIDERIGHT STD/EXCHNG/J/TIP SS 0.035IN 5X260CM

## (undated) DEVICE — SKIN AFFIX SURG ADHESIVE 72/CS 0.55ML: Brand: MEDLINE

## (undated) DEVICE — DRAPE,REIN 53X77,STERILE: Brand: MEDLINE

## (undated) DEVICE — CATH DIAG EXPO .045 FL3.5 5F 100CM

## (undated) DEVICE — CVR TRANSD FLX 3DIMEN 14X29.2CM LF STRL